# Patient Record
Sex: FEMALE | Race: BLACK OR AFRICAN AMERICAN | NOT HISPANIC OR LATINO | Employment: FULL TIME | URBAN - METROPOLITAN AREA
[De-identification: names, ages, dates, MRNs, and addresses within clinical notes are randomized per-mention and may not be internally consistent; named-entity substitution may affect disease eponyms.]

---

## 2017-01-13 NOTE — TELEPHONE ENCOUNTER
Pt is requesting a refill for diflucan pt was last seen on 06/16/2016. Pt has an appt in feb for a follow up

## 2017-01-16 RX ORDER — FLUCONAZOLE 150 MG/1
TABLET ORAL
Qty: 1 TABLET | Refills: 1 | Status: SHIPPED | OUTPATIENT
Start: 2017-01-16 | End: 2017-03-16 | Stop reason: SDUPTHER

## 2017-02-07 ENCOUNTER — OFFICE VISIT (OUTPATIENT)
Dept: OBSTETRICS AND GYNECOLOGY | Facility: CLINIC | Age: 37
End: 2017-02-07
Payer: COMMERCIAL

## 2017-02-07 ENCOUNTER — PATIENT MESSAGE (OUTPATIENT)
Dept: OBSTETRICS AND GYNECOLOGY | Facility: CLINIC | Age: 37
End: 2017-02-07

## 2017-02-07 VITALS
BODY MASS INDEX: 31.24 KG/M2 | WEIGHT: 183 LBS | SYSTOLIC BLOOD PRESSURE: 120 MMHG | DIASTOLIC BLOOD PRESSURE: 80 MMHG | HEIGHT: 64 IN

## 2017-02-07 DIAGNOSIS — D25.1 INTRAMURAL LEIOMYOMA OF UTERUS: ICD-10-CM

## 2017-02-07 DIAGNOSIS — N92.0 MENORRHAGIA WITH REGULAR CYCLE: Primary | ICD-10-CM

## 2017-02-07 DIAGNOSIS — Z01.411 ENCOUNTER FOR GYNECOLOGICAL EXAMINATION WITH ABNORMAL FINDING: ICD-10-CM

## 2017-02-07 DIAGNOSIS — I10 ESSENTIAL HYPERTENSION: ICD-10-CM

## 2017-02-07 PROCEDURE — 3074F SYST BP LT 130 MM HG: CPT | Mod: S$GLB,,, | Performed by: OBSTETRICS & GYNECOLOGY

## 2017-02-07 PROCEDURE — 99999 PR PBB SHADOW E&M-EST. PATIENT-LVL II: CPT | Mod: PBBFAC,,, | Performed by: OBSTETRICS & GYNECOLOGY

## 2017-02-07 PROCEDURE — 88141 CYTOPATH C/V INTERPRET: CPT | Mod: ,,, | Performed by: PATHOLOGY

## 2017-02-07 PROCEDURE — 99395 PREV VISIT EST AGE 18-39: CPT | Mod: S$GLB,,, | Performed by: OBSTETRICS & GYNECOLOGY

## 2017-02-07 PROCEDURE — 88175 CYTOPATH C/V AUTO FLUID REDO: CPT | Performed by: PATHOLOGY

## 2017-02-07 PROCEDURE — 3079F DIAST BP 80-89 MM HG: CPT | Mod: S$GLB,,, | Performed by: OBSTETRICS & GYNECOLOGY

## 2017-02-07 RX ORDER — ACETAMINOPHEN AND CODEINE PHOSPHATE 120; 12 MG/5ML; MG/5ML
1 SOLUTION ORAL DAILY
Qty: 28 TABLET | Refills: 4 | Status: SHIPPED | OUTPATIENT
Start: 2017-02-07 | End: 2018-09-18

## 2017-02-16 NOTE — PROGRESS NOTES
HISTORY OF PRESENT ILLNESS:    Sophie Courtney is a 36 y.o. female, , Patient's last menstrual period was 2017.,  presents for a routine exam.   Patient continues to report heavy menses & clotting. Strict precautions given & options discussed in detail.  Patient declines surgical intervention, CHTN therefore combination OCPs are contraindicated,.     Past Medical History   Diagnosis Date    Hyperlipidemia     Hypertension        Past Surgical History   Procedure Laterality Date    Myomectomy       Seven uterine fibroids, largest 11 cm in size     [place] tooth extraction  2013 &      Knee arthroscopy w/ acl reconstruction Right     Achilles tendon surgery Left     Myomectomy       The specimen weighs 779 g and consists of 27 rounded pieces of rubbery tan-pink tissue. The nodules vary from     MEDICATIONS AND ALLERGIES:    Current Outpatient Prescriptions:     atorvastatin (LIPITOR) 20 MG tablet, Take 20 mg by mouth once daily., Disp: , Rfl:     chlorthalidone (HYGROTEN) 25 MG Tab, , Disp: , Rfl:     flavoxate (URISPAS) 100 mg Tab, Take 1 tablet (100 mg total) by mouth 3 (three) times daily as needed., Disp: 20 tablet, Rfl: 1    fluconazole (DIFLUCAN) 150 MG Tab, TK ONE T     PO ONCE, Disp: 1 tablet, Rfl: 1    medroxyPROGESTERone (PROVERA) 10 MG tablet, Take 1 tablet (10 mg total) by mouth once daily. Take one pill per day on cycle days # 14-25 for a total of 12 days., Disp: 36 tablet, Rfl: 0    norethindrone (JOLIVETTE) 0.35 mg tablet, Take 1 tablet (0.35 mg total) by mouth once daily., Disp: 28 tablet, Rfl: 4    Review of patient's allergies indicates:   Allergen Reactions    Crestor [rosuvastatin]     Neosporin [hydrocortisone]     Nickel sutures [surgical stainless steel] Hives       Family History   Problem Relation Age of Onset    Hypertension Father     Hypertension Mother     Stroke Maternal Aunt        Social History     Social History     "Marital status: Single     Spouse name: N/A    Number of children: N/A    Years of education: N/A     Occupational History    Not on file.     Social History Main Topics    Smoking status: Never Smoker    Smokeless tobacco: Not on file    Alcohol use No    Drug use: No    Sexual activity: No     Other Topics Concern    Not on file     Social History Narrative       COMPREHENSIVE GYN HISTORY:  PAP History: Denies abnormal Paps.  Infection History: Denies STDs. Denies PID.  Benign History: Denies uterine fibroids. Denies ovarian cysts. Denies endometriosis. Denies other conditions.  Cancer History: Denies cervical cancer. Denies uterine cancer or hyperplasia. Denies ovarian cancer. Denies vulvar cancer or pre-cancer. Denies vaginal cancer or pre-cancer. Denies breast cancer. Denies colon cancer.  Sexual Activity History: Denies currently being sexually active      ROS:  GENERAL: No weight changes. No swelling. No fatigue. No fever.  CARDIOVASCULAR: No chest pain. No shortness of breath. No leg cramps.   NEUROLOGICAL: No headaches. No vision changes.  BREASTS: No pain. No lumps. No discharge.  ABDOMEN: No pain. No nausea. No vomiting. No diarrhea. No constipation.  REPRODUCTIVE: No abnormal bleeding.   VULVA: No pain. No lesions. No itching.  VAGINA: No relaxation. No itching. No odor. No discharge. No lesions.  URINARY: No incontinence. No nocturia. No frequency. No dysuria.    Visit Vitals    /80    Ht 5' 4" (1.626 m)    Wt 83 kg (182 lb 15.7 oz)    LMP 01/23/2017    BMI 31.41 kg/m2       PE:  APPEARANCE: Well nourished, well developed, in no acute distress.  AFFECT: WNL, alert and oriented x 3.  SKIN: No acne or hirsutism.  NECK: Neck symmetric, without masses or thyromegaly.  NODES: No inguinal, cervical, axillary or femoral lymph node enlargement.  CHEST: Good respiratory effort.   ABDOMEN: Soft. No tenderness or masses. No hepatosplenomegaly. No hernias.  BREASTS: Symmetrical, no skin changes, " visible lesions, palpable masses or nipple discharge bilaterally.  PELVIC: External female genitalia without lesions.  Female hair distribution. Adequate perineal body, Normal urethral meatus. Vagina moist and well rugated without lesions or discharge.  No significant cystocele or rectocele present. Cervix pink without lesions, discharge or tenderness. Uterus is 14-16 week size, irregular, mobile and nontender. Adnexa without masses or tenderness.  EXTREMITIES: No edema    PROCEDURES:  Pap    1. Menorrhagia with regular cycle    2. Essential hypertension    3. Intramural leiomyoma of uterus    4. Encounter for gynecological examination with abnormal finding        PLAN:    Orders Placed This Encounter    CBC auto differential    T4, free    TSH    Liquid-based pap smear, screening    norethindrone (JOLIVETTE) 0.35 mg tablet       COUNSELING:  The patient was counseled today on:  -A.C.S. Pap and pelvic exam guidelines, recomendations for yearly mammogram, monthly self breast exams and to follow up with her PCP for other health maintenance.    Options discussed for UF, patient desires medical management at this time.     FOLLOW-UP with me in 6 months

## 2017-03-17 RX ORDER — FLUCONAZOLE 150 MG/1
TABLET ORAL
Qty: 1 TABLET | Refills: 1 | Status: SHIPPED | OUTPATIENT
Start: 2017-03-17 | End: 2017-03-24 | Stop reason: SDUPTHER

## 2017-03-24 RX ORDER — FLUCONAZOLE 150 MG/1
TABLET ORAL
Qty: 1 TABLET | Refills: 1 | Status: SHIPPED | OUTPATIENT
Start: 2017-03-24 | End: 2017-10-02 | Stop reason: SDUPTHER

## 2017-03-24 NOTE — TELEPHONE ENCOUNTER
Pt states that the pharmacy did nto have her refill on the diflucan and she wanted it called in again.

## 2017-10-05 RX ORDER — FLUCONAZOLE 150 MG/1
TABLET ORAL
Qty: 1 TABLET | Refills: 1 | Status: SHIPPED | OUTPATIENT
Start: 2017-10-05 | End: 2017-10-18 | Stop reason: SDUPTHER

## 2017-10-18 ENCOUNTER — TELEPHONE (OUTPATIENT)
Dept: OBSTETRICS AND GYNECOLOGY | Facility: CLINIC | Age: 37
End: 2017-10-18

## 2017-10-18 RX ORDER — FLUCONAZOLE 150 MG/1
TABLET ORAL
Qty: 1 TABLET | Refills: 1 | Status: SHIPPED | OUTPATIENT
Start: 2017-10-18 | End: 2018-09-18 | Stop reason: SDUPTHER

## 2017-10-18 NOTE — TELEPHONE ENCOUNTER
----- Message from Dante Cerda sent at 10/18/2017  4:43 PM CDT -----  Contact: pt  x_ 1st Request   _ 2nd Request   _ 3rd Request     Who: SYLVIA BARRAGAN [[id number]]    Why: pt called stated pharmacy told her they did not have her prescription for Rx fluconazole (DIFLUCAN) 150 MG Tab.  Please call the prescription in to [id number] - [address] [phone]    What Number to Call Back: [phone]    When to Expect a call back: (Before the end of the day)   -- if call after 3:00 call back will be tomorrow.

## 2017-10-18 NOTE — TELEPHONE ENCOUNTER
----- Message from Isis Burris sent at 10/18/2017 12:15 PM CDT -----  Contact: self  x_  1st Request  _  2nd Request  _  3rd Request    Who:pt    Why: pt needs to speak with a nurse in regards to prescription that needed to be sent to local pharmacy.. Please advise    What Number to Call Back: [phone]    When to Expect a call back: (Before the end of the day)   -- if call after 3:00 call back will be tomorrow.

## 2017-10-18 NOTE — TELEPHONE ENCOUNTER
Called pt's pharmacy regarding her Rx not being available.  Spoke to Regina at [facility] who stated that the pt's Rx was called in and that she has already picked it up.

## 2018-09-18 ENCOUNTER — OFFICE VISIT (OUTPATIENT)
Dept: OBSTETRICS AND GYNECOLOGY | Facility: CLINIC | Age: 38
End: 2018-09-18
Payer: COMMERCIAL

## 2018-09-18 VITALS
WEIGHT: 187.38 LBS | HEIGHT: 64 IN | SYSTOLIC BLOOD PRESSURE: 120 MMHG | BODY MASS INDEX: 31.99 KG/M2 | DIASTOLIC BLOOD PRESSURE: 80 MMHG

## 2018-09-18 DIAGNOSIS — N92.0 MENORRHAGIA WITH REGULAR CYCLE: ICD-10-CM

## 2018-09-18 DIAGNOSIS — Z01.419 ENCOUNTER FOR GYNECOLOGICAL EXAMINATION WITHOUT ABNORMAL FINDING: ICD-10-CM

## 2018-09-18 DIAGNOSIS — D25.1 INTRAMURAL LEIOMYOMA OF UTERUS: ICD-10-CM

## 2018-09-18 DIAGNOSIS — Z12.31 VISIT FOR SCREENING MAMMOGRAM: ICD-10-CM

## 2018-09-18 DIAGNOSIS — I10 ESSENTIAL HYPERTENSION: Primary | ICD-10-CM

## 2018-09-18 PROCEDURE — 99999 PR PBB SHADOW E&M-EST. PATIENT-LVL III: CPT | Mod: PBBFAC,,, | Performed by: OBSTETRICS & GYNECOLOGY

## 2018-09-18 PROCEDURE — 99395 PREV VISIT EST AGE 18-39: CPT | Mod: S$GLB,,, | Performed by: OBSTETRICS & GYNECOLOGY

## 2018-09-18 PROCEDURE — 3074F SYST BP LT 130 MM HG: CPT | Mod: CPTII,S$GLB,, | Performed by: OBSTETRICS & GYNECOLOGY

## 2018-09-18 PROCEDURE — 3079F DIAST BP 80-89 MM HG: CPT | Mod: CPTII,S$GLB,, | Performed by: OBSTETRICS & GYNECOLOGY

## 2018-09-18 NOTE — PROGRESS NOTES
HISTORY OF PRESENT ILLNESS:    Sophie Courtney is a 37 y.o. female, , Patient's last menstrual period was 2018.,  presents for a routine exam.   Cycles lasting 7 days using 12 pads per day (double protection) with occasional clotting.   Significant fatigue the first two days. Strict precautions given & options discussed in detail.  Patient declines surgical intervention, CHTN therefore combination OCPs are contraindicated,.     Past Medical History:   Diagnosis Date    Hyperlipidemia     Hypertension        Past Surgical History:   Procedure Laterality Date    ACHILLES TENDON SURGERY Left     KNEE ARTHROSCOPY W/ ACL RECONSTRUCTION Right     MYOMECTOMY      Seven uterine fibroids, largest 11 cm in size     MYOMECTOMY      The specimen weighs 779 g and consists of 27 rounded pieces of rubbery tan-pink tissue. The nodules vary from    MYOMECTOMY abdominal N/A 7/10/2014    Performed by [facility]oval [facility] at [facility]    WISDOM TOOTH EXTRACTION  2013 &       MEDICATIONS AND ALLERGIES:    Current Outpatient Medications:     atorvastatin (LIPITOR) 20 MG tablet, Take 20 mg by mouth once daily., Disp: , Rfl:     chlorthalidone (HYGROTEN) 25 MG Tab, , Disp: , Rfl:     fluconazole (DIFLUCAN) 150 MG Tab, TK ONE T     PO ONCE, Disp: 1 tablet, Rfl: 1    Review of patient's allergies indicates:   Allergen Reactions    Crestor [rosuvastatin]     Neosporin [hydrocortisone]     Nickel sutures [surgical stainless steel] Hives       Family History   Problem Relation Age of Onset    Hypertension Father     Hypertension Mother     Stroke Maternal Aunt     Breast cancer Paternal Aunt 60       Social History     Socioeconomic History    Marital status: Single     Spouse name: Not on file    Number of children: Not on file    Years of education: Not on file    Highest education level: Not on file   Social Needs    Financial resource strain: Not on file    Food insecurity  "- worry: Not on file    Food insecurity - inability: Not on file    Transportation needs - medical: Not on file    Transportation needs - non-medical: Not on file   Occupational History    Not on file   Tobacco Use    Smoking status: Never Smoker    Smokeless tobacco: Never Used   Substance and Sexual Activity    Alcohol use: No    Drug use: No    Sexual activity: No   Other Topics Concern    Not on file   Social History Narrative    Not on file       COMPREHENSIVE GYN HISTORY:  PAP History: Denies abnormal Paps.  Infection History: Denies STDs. Denies PID.  Benign History: Denies uterine fibroids. Denies ovarian cysts. Denies endometriosis. Denies other conditions.  Cancer History: Denies cervical cancer. Denies uterine cancer or hyperplasia. Denies ovarian cancer. Denies vulvar cancer or pre-cancer. Denies vaginal cancer or pre-cancer. Denies breast cancer. Denies colon cancer.  Sexual Activity History: Denies currently being sexually active      ROS:  GENERAL: No weight changes. No swelling. No fatigue. No fever.  CARDIOVASCULAR: No chest pain. No shortness of breath. No leg cramps.   NEUROLOGICAL: No headaches. No vision changes.  BREASTS: No pain. No lumps. No discharge.  ABDOMEN: No pain. No nausea. No vomiting. No diarrhea. No constipation.  REPRODUCTIVE: No abnormal bleeding.   VULVA: No pain. No lesions. No itching.  VAGINA: No relaxation. No itching. No odor. No discharge. No lesions.  URINARY: No incontinence. No nocturia. No frequency. No dysuria.    /80   Ht 5' 4" (1.626 m)   Wt 85 kg (187 lb 6.3 oz)   LMP 09/04/2018   BMI 32.17 kg/m²     PE:  APPEARANCE: Well nourished, well developed, in no acute distress.  AFFECT: WNL, alert and oriented x 3.  SKIN: No acne or hirsutism.  NECK: Neck symmetric, without masses or thyromegaly.  NODES: No inguinal, cervical, axillary or femoral lymph node enlargement.  CHEST: Good respiratory effort.   ABDOMEN: Soft. No tenderness or masses. No " hepatosplenomegaly. No hernias.  BREASTS: Symmetrical, no skin changes, visible lesions, palpable masses or nipple discharge bilaterally.  PELVIC: External female genitalia without lesions.  Female hair distribution. Adequate perineal body, Normal urethral meatus. Vagina moist and well rugated without lesions or discharge.  No significant cystocele or rectocele present. Cervix pink without lesions, discharge or tenderness. Uterus is 22-24 weeks size, irregular, mobile and nontender. Adnexa without masses or tenderness.  EXTREMITIES: No edema      1. Essential hypertension    2. Menorrhagia with regular cycle    3. Intramural leiomyoma of uterus    4. Encounter for gynecological examination without abnormal finding      COUNSELING:  The patient was counseled today on:  -A.C.S. Pap and pelvic exam guidelines, recomendations for yearly mammogram, monthly self breast exams and to follow up with her PCP for other health maintenance.    Options discussed for UF, patient desires conservative management at this time.     FOLLOW-UP with me in 3 months

## 2018-09-21 RX ORDER — FLUCONAZOLE 150 MG/1
TABLET ORAL
Qty: 1 TABLET | Refills: 3 | Status: SHIPPED | OUTPATIENT
Start: 2018-09-21 | End: 2020-11-13 | Stop reason: SDUPTHER

## 2018-10-09 ENCOUNTER — HOSPITAL ENCOUNTER (OUTPATIENT)
Dept: RADIOLOGY | Facility: OTHER | Age: 38
Discharge: HOME | End: 2018-10-09
Attending: OBSTETRICS & GYNECOLOGY
Payer: COMMERCIAL

## 2018-10-09 DIAGNOSIS — N92.0 MENORRHAGIA WITH REGULAR CYCLE: ICD-10-CM

## 2018-10-09 DIAGNOSIS — D25.1 INTRAMURAL LEIOMYOMA OF UTERUS: ICD-10-CM

## 2018-10-09 PROCEDURE — 76856 US EXAM PELVIC COMPLETE: CPT | Mod: TC

## 2018-10-09 PROCEDURE — 76856 US EXAM PELVIC COMPLETE: CPT | Mod: 26,,, | Performed by: RADIOLOGY

## 2018-10-11 ENCOUNTER — TELEPHONE (OUTPATIENT)
Dept: OBSTETRICS AND GYNECOLOGY | Facility: CLINIC | Age: 38
End: 2018-10-11

## 2019-01-02 ENCOUNTER — OFFICE VISIT (OUTPATIENT)
Dept: OBSTETRICS AND GYNECOLOGY | Facility: CLINIC | Age: 39
End: 2019-01-02
Payer: COMMERCIAL

## 2019-01-02 ENCOUNTER — HOSPITAL ENCOUNTER (OUTPATIENT)
Dept: RADIOLOGY | Facility: OTHER | Age: 39
Discharge: HOME | End: 2019-01-02
Attending: OBSTETRICS & GYNECOLOGY
Payer: COMMERCIAL

## 2019-01-02 VITALS — SYSTOLIC BLOOD PRESSURE: 120 MMHG | DIASTOLIC BLOOD PRESSURE: 70 MMHG

## 2019-01-02 DIAGNOSIS — N92.0 MENORRHAGIA WITH REGULAR CYCLE: ICD-10-CM

## 2019-01-02 DIAGNOSIS — Z12.31 VISIT FOR SCREENING MAMMOGRAM: ICD-10-CM

## 2019-01-02 DIAGNOSIS — I10 ESSENTIAL HYPERTENSION: Primary | ICD-10-CM

## 2019-01-02 DIAGNOSIS — Z91.89 AT HIGH RISK FOR BREAST CANCER: ICD-10-CM

## 2019-01-02 PROCEDURE — 99999 PR PBB SHADOW E&M-EST. PATIENT-LVL II: CPT | Mod: PBBFAC,,, | Performed by: OBSTETRICS & GYNECOLOGY

## 2019-01-02 PROCEDURE — 77067 SCR MAMMO BI INCL CAD: CPT | Mod: TC

## 2019-01-02 PROCEDURE — 3078F PR MOST RECENT DIASTOLIC BLOOD PRESSURE < 80 MM HG: ICD-10-PCS | Mod: CPTII,S$GLB,, | Performed by: OBSTETRICS & GYNECOLOGY

## 2019-01-02 PROCEDURE — 77063 MAMMO DIGITAL SCREENING BILAT WITH TOMOSYNTHESIS_CAD: ICD-10-PCS | Mod: 26,,, | Performed by: INTERNAL MEDICINE

## 2019-01-02 PROCEDURE — 3074F SYST BP LT 130 MM HG: CPT | Mod: CPTII,S$GLB,, | Performed by: OBSTETRICS & GYNECOLOGY

## 2019-01-02 PROCEDURE — 3074F PR MOST RECENT SYSTOLIC BLOOD PRESSURE < 130 MM HG: ICD-10-PCS | Mod: CPTII,S$GLB,, | Performed by: OBSTETRICS & GYNECOLOGY

## 2019-01-02 PROCEDURE — 99213 PR OFFICE/OUTPT VISIT, EST, LEVL III, 20-29 MIN: ICD-10-PCS | Mod: S$GLB,,, | Performed by: OBSTETRICS & GYNECOLOGY

## 2019-01-02 PROCEDURE — 77067 MAMMO DIGITAL SCREENING BILAT WITH TOMOSYNTHESIS_CAD: ICD-10-PCS | Mod: 26,,, | Performed by: INTERNAL MEDICINE

## 2019-01-02 PROCEDURE — 77067 SCR MAMMO BI INCL CAD: CPT | Mod: 26,,, | Performed by: INTERNAL MEDICINE

## 2019-01-02 PROCEDURE — 99213 OFFICE O/P EST LOW 20 MIN: CPT | Mod: S$GLB,,, | Performed by: OBSTETRICS & GYNECOLOGY

## 2019-01-02 PROCEDURE — 3078F DIAST BP <80 MM HG: CPT | Mod: CPTII,S$GLB,, | Performed by: OBSTETRICS & GYNECOLOGY

## 2019-01-02 PROCEDURE — 99999 PR PBB SHADOW E&M-EST. PATIENT-LVL II: ICD-10-PCS | Mod: PBBFAC,,, | Performed by: OBSTETRICS & GYNECOLOGY

## 2019-01-02 PROCEDURE — 77063 BREAST TOMOSYNTHESIS BI: CPT | Mod: 26,,, | Performed by: INTERNAL MEDICINE

## 2019-01-06 NOTE — PROGRESS NOTES
HISTORY OF PRESENT ILLNESS:    Sophie Courtney is a 38 y.o. female  Patient's last menstrual period was 2019. presents today for follow-up.   She continues to report menorrhagia and has some pelvic discomfort.     Narrative     EXAMINATION:  US PELVIS COMPLETE NON OB    CLINICAL HISTORY:  Excessive and frequent menstruation with regular cycle    TECHNIQUE:  Transabdominal sonography of the pelvis was performed, followed by transvaginal sonography to better evaluate the uterus and ovaries.    COMPARISON:  MRI of the pelvis 2016    FINDINGS:  Uterus:    Size: 21.7 x 15.8 x 19.6 cm    Masses: The uterus is diffusely heterogeneous in appearance with multiple round lesion seen throughout the intramural location.  The dominant fibroid measures 6.3 x 6.3 x 8 cm.    Endometrium: Normal in this pre menopausal patient, measuring 5 mm.    Right ovary:    Size: 3.9 x 2.7 x 2.3 cm    Appearance: Normal    Vascular flow: Normal.    Left ovary:    Size: 5.5 x 5.9 x 4.6 cm    Appearance: Normal    Vascular Flow: Normal.    Free Fluid:    None.      Impression       Enlarged multifibroid uterus      Electronically signed by: Chaparro Mclain MD  Date: 10/09/2018  Time: 11:26         Past Medical History:   Diagnosis Date    Hyperlipidemia     Hypertension        Past Surgical History:   Procedure Laterality Date    ACHILLES TENDON SURGERY Left     KNEE ARTHROSCOPY W/ ACL RECONSTRUCTION Right     MYOMECTOMY  2009    Seven uterine fibroids, largest 11 cm in size     MYOMECTOMY      The specimen weighs 779 g and consists of 27 rounded pieces of rubbery tan-pink tissue. The nodules vary from    MYOMECTOMY abdominal N/A 7/10/2014    [facility]    WISDOM TOOTH EXTRACTION  2013 &         MEDICATIONS AND ALLERGIES:      Current Outpatient Medications:     atorvastatin (LIPITOR) 20 MG tablet, Take 20 mg by mouth once daily., Disp: , Rfl:      chlorthalidone (HYGROTEN) 25 MG Tab, , Disp: , Rfl:     fluconazole (DIFLUCAN) 150 MG Tab, TK ONE T     PO ONCE, Disp: 1 tablet, Rfl: 3    Review of patient's allergies indicates:   Allergen Reactions    Crestor [rosuvastatin]     Neosporin [hydrocortisone]     Nickel sutures [surgical stainless steel] Hives       COMPREHENSIVE GYN HISTORY:  PAP History: Denies abnormal Paps.  Infection History: Denies STDs. Denies PID.  Benign History: Denies uterine fibroids. Denies ovarian cysts. Denies endometriosis. Denies other conditions.  Cancer History: Denies cervical cancer. Denies uterine cancer or hyperplasia. Denies ovarian cancer. Denies vulvar cancer or pre-cancer. Denies vaginal cancer or pre-cancer. Denies breast cancer. Denies colon cancer.  Sexual Activity History: Reports currently being sexually active  Menstrual History: Every 28 days, flows for 4 days. Light flow.  Dysmenorrhea History: Denies dysmenorrhea.    ROS:  GENERAL: No fever or chills.  BREASTS: No pain. No lumps. No discharge.  ABDOMEN: No pain. No nausea. No vomiting. No diarrhea. No constipation.  REPRODUCTIVE: No abnormal bleeding.   VULVA: No pain. No lesions. No itching.  VAGINA: No relaxation. No itching. No odor. No discharge. No lesions.  URINARY: No incontinence. No nocturia. No frequency. No dysuria.    PE:  APPEARANCE: Well nourished, well developed, in no acute distress.  AFFECT: WNL, alert and oriented x 3.  Deferred    1. Essential hypertension    2. Menorrhagia with regular cycle    3. At high risk for breast cancer        PLAN:    Orders Placed This Encounter    MRI Breast w/o Contrast, Bilateral       COUNSELING:  The patient was counseled today on uterine fibroids.     FOLLOW-UP with me

## 2019-01-10 ENCOUNTER — OFFICE VISIT (OUTPATIENT)
Dept: OBSTETRICS AND GYNECOLOGY | Facility: CLINIC | Age: 39
End: 2019-01-10
Payer: COMMERCIAL

## 2019-01-10 VITALS — WEIGHT: 165 LBS | BODY MASS INDEX: 28.32 KG/M2

## 2019-01-10 DIAGNOSIS — D25.1 INTRAMURAL LEIOMYOMA OF UTERUS: ICD-10-CM

## 2019-01-10 DIAGNOSIS — I10 ESSENTIAL HYPERTENSION: Primary | ICD-10-CM

## 2019-01-10 DIAGNOSIS — N92.0 MENORRHAGIA WITH REGULAR CYCLE: ICD-10-CM

## 2019-01-10 PROCEDURE — 3008F BODY MASS INDEX DOCD: CPT | Mod: CPTII,S$GLB,, | Performed by: OBSTETRICS & GYNECOLOGY

## 2019-01-10 PROCEDURE — 99999 PR PBB SHADOW E&M-EST. PATIENT-LVL II: ICD-10-PCS | Mod: PBBFAC,,, | Performed by: OBSTETRICS & GYNECOLOGY

## 2019-01-10 PROCEDURE — 99212 PR OFFICE/OUTPT VISIT, EST, LEVL II, 10-19 MIN: ICD-10-PCS | Mod: S$GLB,,, | Performed by: OBSTETRICS & GYNECOLOGY

## 2019-01-10 PROCEDURE — 3008F PR BODY MASS INDEX (BMI) DOCUMENTED: ICD-10-PCS | Mod: CPTII,S$GLB,, | Performed by: OBSTETRICS & GYNECOLOGY

## 2019-01-10 PROCEDURE — 99999 PR PBB SHADOW E&M-EST. PATIENT-LVL II: CPT | Mod: PBBFAC,,, | Performed by: OBSTETRICS & GYNECOLOGY

## 2019-01-10 PROCEDURE — 99212 OFFICE O/P EST SF 10 MIN: CPT | Mod: S$GLB,,, | Performed by: OBSTETRICS & GYNECOLOGY

## 2019-01-10 RX ORDER — CLOTRIMAZOLE AND BETAMETHASONE DIPROPIONATE 10; .64 MG/G; MG/G
CREAM TOPICAL
COMMUNITY
Start: 2019-01-09 | End: 2020-11-09

## 2019-01-10 NOTE — PROGRESS NOTES
HISTORY OF PRESENT ILLNESS:    Sophie Courtney is a 38 y.o. female  Patient's last menstrual period was 2019. presents today for follow up.   Labs & Us reviewed.     Menorrhagia - first two days using 8-10 pads per day with clotting. Feels drained. Other days, bleeding somewhat improved.     Narrative       EXAMINATION:  US PELVIS COMPLETE NON OB    CLINICAL HISTORY:  Excessive and frequent menstruation with regular cycle    TECHNIQUE:  Transabdominal sonography of the pelvis was performed, followed by transvaginal sonography to better evaluate the uterus and ovaries.    COMPARISON:  MRI of the pelvis 2016    FINDINGS:  Uterus:    Size: 21.7 x 15.8 x 19.6 cm    Masses: The uterus is diffusely heterogeneous in appearance with multiple round lesion seen throughout the intramural location.  The dominant fibroid measures 6.3 x 6.3 x 8 cm.    Endometrium: Normal in this pre menopausal patient, measuring 5 mm.    Right ovary:    Size: 3.9 x 2.7 x 2.3 cm    Appearance: Normal    Vascular flow: Normal.    Left ovary:    Size: 5.5 x 5.9 x 4.6 cm    Appearance: Normal    Vascular Flow: Normal.    Free Fluid:    None.       Impression         Enlarged multifibroid uterus      Electronically signed by: Chaparro Mclain MD  Date: 10/09/2018  Time: 11:26         Past Medical History:   Diagnosis Date    Hyperlipidemia     Hypertension        Past Surgical History:   Procedure Laterality Date    ACHILLES TENDON SURGERY Left     KNEE ARTHROSCOPY W/ ACL RECONSTRUCTION Right     MYOMECTOMY      Seven uterine fibroids, largest 11 cm in size     MYOMECTOMY      The specimen weighs 779 g and consists of 27 rounded pieces of rubbery tan-pink tissue. The nodules vary from    MYOMECTOMY abdominal N/A 7/10/2014    Performed by [facility] at [facility] OR    WISDOM TOOTH EXTRACTION  2013 &         MEDICATIONS AND ALLERGIES:      Current Outpatient Medications:      atorvastatin (LIPITOR) 20 MG tablet, Take 20 mg by mouth once daily., Disp: , Rfl:     chlorthalidone (HYGROTEN) 25 MG Tab, , Disp: , Rfl:     clotrimazole-betamethasone 1-0.05% (LOTRISONE) cream, , Disp: , Rfl:     fluconazole (DIFLUCAN) 150 MG Tab, TK ONE T     PO ONCE, Disp: 1 tablet, Rfl: 3    Review of patient's allergies indicates:   Allergen Reactions    Crestor [rosuvastatin]     Neosporin [hydrocortisone]     Nickel sutures [surgical stainless steel] Hives       COMPREHENSIVE GYN HISTORY:  PAP History: Denies abnormal Paps.  Infection History: Denies STDs. Denies PID.  Benign History: Denies uterine fibroids. Denies ovarian cysts. Denies endometriosis. Denies other conditions.  Cancer History: Denies cervical cancer. Denies uterine cancer or hyperplasia. Denies ovarian cancer. Denies vulvar cancer or pre-cancer. Denies vaginal cancer or pre-cancer. Denies breast cancer. Denies colon cancer.  Sexual Activity History: Reports currently being sexually active  Menstrual History: Every 28 days, flows for 4 days. Light flow.  Dysmenorrhea History: Denies dysmenorrhea.    ROS:  GENERAL: No fever or chills.  BREASTS: No pain. No lumps. No discharge.  ABDOMEN: No pain. No nausea. No vomiting. No diarrhea. No constipation.  REPRODUCTIVE: No abnormal bleeding.   VULVA: No pain. No lesions. No itching.  VAGINA: No relaxation. No itching. No odor. No discharge. No lesions.  URINARY: No incontinence. No nocturia. No frequency. No dysuria.    PE:  APPEARANCE: Well nourished, well developed, in no acute distress.  AFFECT: WNL, alert and oriented x 3.  ABDOMEN: Soft. No tenderness or masses. No hepatosplenomegaly. No hernias.      1. Essential hypertension    2. Intramural leiomyoma of uterus    3. Menorrhagia with regular cycle      FOLLOW-UP with me in 3 months   Options discussed for menorrhagia, patient unsure of [place]. Does not desire hysterectomy at this time. Medical options also discussed.

## 2019-01-14 ENCOUNTER — HOSPITAL ENCOUNTER (OUTPATIENT)
Dept: RADIOLOGY | Facility: OTHER | Age: 39
Discharge: HOME | End: 2019-01-14
Attending: OBSTETRICS & GYNECOLOGY
Payer: COMMERCIAL

## 2019-01-14 DIAGNOSIS — R92.8 ABNORMAL MAMMOGRAM: ICD-10-CM

## 2019-01-14 PROCEDURE — 77065 DX MAMMO INCL CAD UNI: CPT | Mod: TC,LT

## 2019-01-14 PROCEDURE — 77061 BREAST TOMOSYNTHESIS UNI: CPT | Mod: TC,LT

## 2019-01-14 PROCEDURE — 77065 MAMMO DIGITAL DIAGNOSTIC LEFT WITH TOMOSYNTHESIS_CAD: ICD-10-PCS | Mod: 26,LT,, | Performed by: RADIOLOGY

## 2019-01-14 PROCEDURE — 77065 DX MAMMO INCL CAD UNI: CPT | Mod: 26,LT,, | Performed by: RADIOLOGY

## 2019-01-14 PROCEDURE — 77061 MAMMO DIGITAL DIAGNOSTIC LEFT WITH TOMOSYNTHESIS_CAD: ICD-10-PCS | Mod: 26,LT,, | Performed by: RADIOLOGY

## 2019-01-14 PROCEDURE — 77061 BREAST TOMOSYNTHESIS UNI: CPT | Mod: 26,LT,, | Performed by: RADIOLOGY

## 2020-07-15 ENCOUNTER — PATIENT MESSAGE (OUTPATIENT)
Dept: FAMILY MEDICINE | Facility: CLINIC | Age: 40
End: 2020-07-15

## 2020-07-15 RX ORDER — CHLORTHALIDONE 25 MG/1
25 TABLET ORAL DAILY
Qty: 30 TABLET | Refills: 3 | Status: SHIPPED | OUTPATIENT
Start: 2020-07-15 | End: 2021-09-16 | Stop reason: SDUPTHER

## 2020-07-15 RX ORDER — ATORVASTATIN CALCIUM 20 MG/1
20 TABLET, FILM COATED ORAL DAILY
Qty: 30 TABLET | Refills: 3 | Status: SHIPPED | OUTPATIENT
Start: 2020-07-15 | End: 2021-09-16 | Stop reason: SDUPTHER

## 2020-07-15 NOTE — TELEPHONE ENCOUNTER
Requesting refill. Says she is put of medication. Has not been seen in over a year. Refused a virtual visit. Please advise. Okay to refill? Thanks

## 2020-07-15 NOTE — TELEPHONE ENCOUNTER
This prescription has been approved and sent to   [id number] - [place] - [address] AT [address]  [facility] [address]  Phone: [phone] Fax: [phone]

## 2020-07-28 ENCOUNTER — TELEPHONE (OUTPATIENT)
Dept: FAMILY MEDICINE | Facility: CLINIC | Age: 40
End: 2020-07-28

## 2020-07-28 DIAGNOSIS — Z13.6 SCREENING FOR ISCHEMIC HEART DISEASE (IHD): ICD-10-CM

## 2020-07-28 DIAGNOSIS — Z13.89 SCREENING FOR BLOOD OR PROTEIN IN URINE: Primary | ICD-10-CM

## 2020-07-28 DIAGNOSIS — Z13.0 SCREENING FOR IRON DEFICIENCY ANEMIA: ICD-10-CM

## 2020-07-28 DIAGNOSIS — I10 ESSENTIAL HYPERTENSION: ICD-10-CM

## 2020-07-28 NOTE — TELEPHONE ENCOUNTER
----- Message from Fawn Anderson sent at 7/28/2020  9:48 AM CDT -----  Pt wants her lab work done before her next appt on 8/26. Please send lab orders to [facility]. Pt #[phone]

## 2020-08-07 ENCOUNTER — OFFICE VISIT (OUTPATIENT)
Dept: OBSTETRICS AND GYNECOLOGY | Facility: CLINIC | Age: 40
End: 2020-08-07
Payer: COMMERCIAL

## 2020-08-07 VITALS — BODY MASS INDEX: 30.11 KG/M2 | HEIGHT: 64 IN | WEIGHT: 176.38 LBS

## 2020-08-07 DIAGNOSIS — Z00.00 VISIT FOR ANNUAL HEALTH EXAMINATION: ICD-10-CM

## 2020-08-07 DIAGNOSIS — D25.9 UTERINE LEIOMYOMA, UNSPECIFIED LOCATION: Primary | ICD-10-CM

## 2020-08-07 DIAGNOSIS — Z12.31 VISIT FOR SCREENING MAMMOGRAM: ICD-10-CM

## 2020-08-07 PROCEDURE — 88175 CYTOPATH C/V AUTO FLUID REDO: CPT

## 2020-08-07 PROCEDURE — 99396 PR PREVENTIVE VISIT,EST,40-64: ICD-10-PCS | Mod: S$GLB,,, | Performed by: OBSTETRICS & GYNECOLOGY

## 2020-08-07 PROCEDURE — 87624 HPV HI-RISK TYP POOLED RSLT: CPT

## 2020-08-07 PROCEDURE — 3008F BODY MASS INDEX DOCD: CPT | Mod: CPTII,S$GLB,, | Performed by: OBSTETRICS & GYNECOLOGY

## 2020-08-07 PROCEDURE — 99999 PR PBB SHADOW E&M-EST. PATIENT-LVL III: CPT | Mod: PBBFAC,,, | Performed by: OBSTETRICS & GYNECOLOGY

## 2020-08-07 PROCEDURE — 99396 PREV VISIT EST AGE 40-64: CPT | Mod: S$GLB,,, | Performed by: OBSTETRICS & GYNECOLOGY

## 2020-08-07 PROCEDURE — 3008F PR BODY MASS INDEX (BMI) DOCUMENTED: ICD-10-PCS | Mod: CPTII,S$GLB,, | Performed by: OBSTETRICS & GYNECOLOGY

## 2020-08-07 PROCEDURE — 99999 PR PBB SHADOW E&M-EST. PATIENT-LVL III: ICD-10-PCS | Mod: PBBFAC,,, | Performed by: OBSTETRICS & GYNECOLOGY

## 2020-08-07 RX ORDER — ALUMINUM CHLORIDE 20 %
20 SOLUTION, NON-ORAL TOPICAL
COMMUNITY
Start: 2017-08-02 | End: 2020-11-09

## 2020-08-07 RX ORDER — FLUCONAZOLE 150 MG/1
150 TABLET ORAL ONCE
Qty: 1 TABLET | Refills: 0 | Status: CANCELLED | OUTPATIENT
Start: 2020-08-07 | End: 2020-08-07

## 2020-08-07 NOTE — PROGRESS NOTES
HISTORY OF PRESENT ILLNESS:  Patient with long history of uterine fibroids and menorrhagia with first two days using 8-10 pads per day with clotting.  Patient does report some increase in uterine size over the last year. No SOB or CP, some discomfort with bending and stooping.   Hx of myomectomy X 2     Sophie Courtney is a 39 y.o. female, , Patient's last menstrual period was 07/15/2020.,  presents for a routine exam and has no complaints.    Past Medical History:   Diagnosis Date    Hyperlipidemia     Hypertension        Past Surgical History:   Procedure Laterality Date    ACHILLES TENDON SURGERY Left     KNEE ARTHROSCOPY W/ ACL RECONSTRUCTION Right     MYOMECTOMY      Seven uterine fibroids, largest 11 cm in size     MYOMECTOMY  2014    The specimen weighs 779 g and consists of 27 rounded pieces of rubbery tan-pink tissue. The nodules vary from    WISDOM TOOTH EXTRACTION  2013 &         MEDICATIONS AND ALLERGIES:      Current Outpatient Medications:     atorvastatin (LIPITOR) 20 MG tablet, Take 1 tablet (20 mg total) by mouth once daily., Disp: 30 tablet, Rfl: 3    chlorthalidone (HYGROTEN) 25 MG Tab, Take 1 tablet (25 mg total) by mouth once daily., Disp: 30 tablet, Rfl: 3    aluminum chloride (DRYSOL DAB-O-MATIC) 20 % external solution, 20 %., Disp: , Rfl:     clotrimazole-betamethasone 1-0.05% (LOTRISONE) cream, , Disp: , Rfl:     fluconazole (DIFLUCAN) 150 MG Tab, TK ONE T     PO ONCE (Patient not taking: Reported on 2020), Disp: 1 tablet, Rfl: 3    Review of patient's allergies indicates:   Allergen Reactions    Crestor [rosuvastatin]     Neosporin [hydrocortisone]     Nickel sutures [surgical stainless steel] Hives       Family History   Problem Relation Age of Onset    Hypertension Father     Hypertension Mother     Stroke Maternal Aunt     Breast cancer Paternal Aunt 60       Social History     Socioeconomic History    Marital status: Single      Spouse name: Not on file    Number of children: Not on file    Years of education: Not on file    Highest education level: Not on file   Occupational History    Not on file   Social Needs    Financial resource strain: Not on file    Food insecurity     Worry: Not on file     Inability: Not on file    Transportation needs     Medical: Not on file     Non-medical: Not on file   Tobacco Use    Smoking status: Never Smoker    Smokeless tobacco: Never Used   Substance and Sexual Activity    Alcohol use: No    Drug use: No    Sexual activity: Never   Lifestyle    Physical activity     Days per week: Not on file     Minutes per session: Not on file    Stress: Not on file   Relationships    Social connections     Talks on phone: Not on file     Gets together: Not on file     Attends Mormonism service: Not on file     Active member of club or organization: Not on file     Attends meetings of clubs or organizations: Not on file     Relationship status: Not on file   Other Topics Concern    Not on file   Social History Narrative    Not on file       COMPREHENSIVE GYN HISTORY:  PAP History: Denies abnormal Paps.  Infection History: Denies STDs. Denies PID.  Benign History: Denies uterine fibroids. Denies ovarian cysts. Denies endometriosis. Denies other conditions.  Cancer History: Denies cervical cancer. Denies uterine cancer or hyperplasia. Denies ovarian cancer. Denies vulvar cancer or pre-cancer. Denies vaginal cancer or pre-cancer. Denies breast cancer. Denies colon cancer.  Sexual Activity History: Reports currently being sexually active  Menstrual History: Monthly. Mod then light flow.   Dysmenorrhea History: Reports mild dysmenorrhea.       ROS:  GENERAL: No weight changes. No swelling. No fatigue. No fever.  CARDIOVASCULAR: No chest pain. No shortness of breath. No leg cramps.   NEUROLOGICAL: No headaches. No vision changes.  BREASTS: No pain. No lumps. No discharge.  ABDOMEN: No pain. No nausea. No  "vomiting. No diarrhea. No constipation.  REPRODUCTIVE: No abnormal bleeding.   VULVA: No pain. No lesions. No itching.  VAGINA: No relaxation. No itching. No odor. No discharge. No lesions.  URINARY: No incontinence. No nocturia. No frequency. No dysuria.    Ht 5' 4" (1.626 m)   Wt 80 kg (176 lb 5.9 oz)   LMP 07/15/2020   BMI 30.27 kg/m²     PE:  APPEARANCE: Well nourished, well developed, in no acute distress.  AFFECT: WNL, alert and oriented x 3.  SKIN: No acne or hirsutism.  NECK: Neck symmetric, without masses or thyromegaly.  NODES: No inguinal, cervical, axillary or femoral lymph node enlargement.  CHEST: Good respiratory effort.   ABDOMEN: Soft. No tenderness or masses. No hepatosplenomegaly. No hernias.  BREASTS: Symmetrical, no skin changes, visible lesions, palpable masses or nipple discharge bilaterally.  PELVIC: External female genitalia without lesions.  Female hair distribution. Adequate perineal body, Normal urethral meatus. Vagina moist and well rugated without lesions or discharge.  No significant cystocele or rectocele present. Cervix pink without lesions, discharge or tenderness. Uterus is 24-26 week size, regular, mobile and nontender. Adnexa without masses or tenderness.  EXTREMITIES: No edema    DIAGNOSIS:  1. Uterine leiomyoma, unspecified location    2. Visit for screening mammogram    3. Visit for annual health examination        PLAN:    Orders Placed This Encounter    HPV High Risk Genotypes, PCR    US Pelvis Comp with Transvag NON-OB (xpd    MRI Pelvis W WO Contrast    Mammo Digital Screening Bilat w/ Zackary    Liquid-Based Pap Smear, Screening       COUNSELING:  The patient was counseled today on:  -A.C.S. Pap and pelvic exam guidelines (pap every 3 years), recomendations for yearly mammogram;    Discussion with patient over the last few years regarding surgical treatment for uterine fibroids with hysterectomy, which was declined.   Patient with increase in size over the last year. " Would strongly urge surgical management. Patient to decide after imaging performed.     FOLLOW-UP with me after MRI of the pelvis.

## 2020-08-14 LAB
HPV HR 12 DNA SPEC QL NAA+PROBE: NEGATIVE
HPV16 AG SPEC QL: NEGATIVE
HPV18 DNA SPEC QL NAA+PROBE: NEGATIVE

## 2020-08-19 ENCOUNTER — TELEPHONE (OUTPATIENT)
Dept: OBSTETRICS AND GYNECOLOGY | Facility: CLINIC | Age: 40
End: 2020-08-19

## 2020-08-19 NOTE — TELEPHONE ENCOUNTER
----- Message from Haley Rogers sent at 8/18/2020  1:01 PM CDT -----  Contact: SYLVIA BARRAGAN [[id number]]  Name of Who is Calling: SYLVIA BARRAGAN [[id number]]    What is the request in detail: Patient would like a call back to discuss ultrasound. Please call       Can the clinic reply by [facility]: no      What Number to Call Back if not in [facility]: [phone] (home)

## 2020-08-20 ENCOUNTER — HOSPITAL ENCOUNTER (OUTPATIENT)
Dept: RADIOLOGY | Facility: OTHER | Age: 40
Discharge: HOME | End: 2020-08-20
Attending: OBSTETRICS & GYNECOLOGY
Payer: COMMERCIAL

## 2020-08-20 DIAGNOSIS — D25.9 UTERINE LEIOMYOMA, UNSPECIFIED LOCATION: ICD-10-CM

## 2020-08-20 DIAGNOSIS — Z12.31 VISIT FOR SCREENING MAMMOGRAM: ICD-10-CM

## 2020-08-20 PROCEDURE — 77067 SCR MAMMO BI INCL CAD: CPT | Mod: TC

## 2020-08-20 PROCEDURE — 77067 SCR MAMMO BI INCL CAD: CPT | Mod: 26,,, | Performed by: RADIOLOGY

## 2020-08-20 PROCEDURE — 77063 BREAST TOMOSYNTHESIS BI: CPT | Mod: 26,,, | Performed by: RADIOLOGY

## 2020-08-20 PROCEDURE — 77063 MAMMO DIGITAL SCREENING BILAT WITH TOMOSYNTHESIS_CAD: ICD-10-PCS | Mod: 26,,, | Performed by: RADIOLOGY

## 2020-08-20 PROCEDURE — 77067 MAMMO DIGITAL SCREENING BILAT WITH TOMOSYNTHESIS_CAD: ICD-10-PCS | Mod: 26,,, | Performed by: RADIOLOGY

## 2020-08-21 ENCOUNTER — TELEPHONE (OUTPATIENT)
Dept: OBSTETRICS AND GYNECOLOGY | Facility: CLINIC | Age: 40
End: 2020-08-21

## 2020-08-21 DIAGNOSIS — I10 HYPERTENSION, UNSPECIFIED TYPE: Primary | ICD-10-CM

## 2020-08-21 LAB
FINAL PATHOLOGIC DIAGNOSIS: NORMAL
Lab: NORMAL

## 2020-08-26 ENCOUNTER — HOSPITAL ENCOUNTER (OUTPATIENT)
Dept: RADIOLOGY | Facility: OTHER | Age: 40
Discharge: HOME | End: 2020-08-26
Attending: OBSTETRICS & GYNECOLOGY
Payer: COMMERCIAL

## 2020-08-26 ENCOUNTER — OFFICE VISIT (OUTPATIENT)
Dept: FAMILY MEDICINE | Facility: CLINIC | Age: 40
End: 2020-08-26
Payer: COMMERCIAL

## 2020-08-26 VITALS
BODY MASS INDEX: 30.11 KG/M2 | HEIGHT: 64 IN | OXYGEN SATURATION: 97 % | SYSTOLIC BLOOD PRESSURE: 150 MMHG | DIASTOLIC BLOOD PRESSURE: 92 MMHG | WEIGHT: 176.38 LBS | TEMPERATURE: 100 F | HEART RATE: 74 BPM

## 2020-08-26 DIAGNOSIS — I10 ESSENTIAL HYPERTENSION: Primary | ICD-10-CM

## 2020-08-26 DIAGNOSIS — D25.9 UTERINE LEIOMYOMA, UNSPECIFIED LOCATION: ICD-10-CM

## 2020-08-26 DIAGNOSIS — T46.906D: ICD-10-CM

## 2020-08-26 DIAGNOSIS — E78.2 MIXED HYPERLIPIDEMIA: ICD-10-CM

## 2020-08-26 DIAGNOSIS — Z13.29 SCREENING FOR ENDOCRINE DISORDER: ICD-10-CM

## 2020-08-26 DIAGNOSIS — D25.1 INTRAMURAL LEIOMYOMA OF UTERUS: ICD-10-CM

## 2020-08-26 DIAGNOSIS — Z13.0 SCREENING FOR IRON DEFICIENCY ANEMIA: ICD-10-CM

## 2020-08-26 DIAGNOSIS — Z91.128: ICD-10-CM

## 2020-08-26 LAB
ALBUMIN SERPL-MCNC: 4.4 G/DL (ref 3.6–5.1)
ALBUMIN/CREAT UR: 3 MCG/MG CREAT
ALBUMIN/GLOB SERPL: 1.7 (CALC) (ref 1–2.5)
ALP SERPL-CCNC: 39 U/L (ref 31–125)
ALT SERPL-CCNC: 8 U/L (ref 6–29)
APPEARANCE UR: CLEAR
AST SERPL-CCNC: 12 U/L (ref 10–30)
BASOPHILS # BLD AUTO: 8 CELLS/UL (ref 0–200)
BASOPHILS NFR BLD AUTO: 0.2 %
BILIRUB SERPL-MCNC: 1.4 MG/DL (ref 0.2–1.2)
BILIRUB UR QL STRIP: NEGATIVE
BUN SERPL-MCNC: 12 MG/DL (ref 7–25)
BUN/CREAT SERPL: ABNORMAL (CALC) (ref 6–22)
CALCIUM SERPL-MCNC: 9.3 MG/DL (ref 8.6–10.2)
CHLORIDE SERPL-SCNC: 102 MMOL/L (ref 98–110)
CHOLEST SERPL-MCNC: 236 MG/DL
CHOLEST/HDLC SERPL: 3.8 (CALC)
CO2 SERPL-SCNC: 30 MMOL/L (ref 20–32)
COLOR UR: YELLOW
CREAT SERPL-MCNC: 0.73 MG/DL (ref 0.5–1.1)
CREAT UR-MCNC: 225 MG/DL (ref 20–275)
EOSINOPHIL # BLD AUTO: 40 CELLS/UL (ref 15–500)
EOSINOPHIL NFR BLD AUTO: 1 %
ERYTHROCYTE [DISTWIDTH] IN BLOOD BY AUTOMATED COUNT: 12.4 % (ref 11–15)
GFRSERPLBLD MDRD-ARVRAT: 102 ML/MIN/1.73M2
GLOBULIN SER CALC-MCNC: 2.6 G/DL (CALC) (ref 1.9–3.7)
GLUCOSE SERPL-MCNC: 80 MG/DL (ref 65–99)
GLUCOSE UR QL STRIP: NEGATIVE
HCT VFR BLD AUTO: 40.3 % (ref 35–45)
HDLC SERPL-MCNC: 62 MG/DL
HGB BLD-MCNC: 13.7 G/DL (ref 11.7–15.5)
HGB UR QL STRIP: NEGATIVE
KETONES UR QL STRIP: NEGATIVE
LDLC SERPL CALC-MCNC: 160 MG/DL (CALC)
LEUKOCYTE ESTERASE UR QL STRIP: NEGATIVE
LYMPHOCYTES # BLD AUTO: 1884 CELLS/UL (ref 850–3900)
LYMPHOCYTES NFR BLD AUTO: 47.1 %
MCH RBC QN AUTO: 31.6 PG (ref 27–33)
MCHC RBC AUTO-ENTMCNC: 34 G/DL (ref 32–36)
MCV RBC AUTO: 92.9 FL (ref 80–100)
MICROALBUMIN UR-MCNC: 0.7 MG/DL
MONOCYTES # BLD AUTO: 336 CELLS/UL (ref 200–950)
MONOCYTES NFR BLD AUTO: 8.4 %
NEUTROPHILS # BLD AUTO: 1732 CELLS/UL (ref 1500–7800)
NEUTROPHILS NFR BLD AUTO: 43.3 %
NITRITE UR QL STRIP: NEGATIVE
NONHDLC SERPL-MCNC: 174 MG/DL (CALC)
PH UR STRIP: 6 [PH] (ref 5–8)
PLATELET # BLD AUTO: 233 THOUSAND/UL (ref 140–400)
PMV BLD REES-ECKER: 10.8 FL (ref 7.5–12.5)
POTASSIUM SERPL-SCNC: 4 MMOL/L (ref 3.5–5.3)
PROT SERPL-MCNC: 7 G/DL (ref 6.1–8.1)
PROT UR QL STRIP: NEGATIVE
RBC # BLD AUTO: 4.34 MILLION/UL (ref 3.8–5.1)
SODIUM SERPL-SCNC: 139 MMOL/L (ref 135–146)
SP GR UR STRIP: 1.03 (ref 1–1.03)
TRIGL SERPL-MCNC: 52 MG/DL
WBC # BLD AUTO: 4 THOUSAND/UL (ref 3.8–10.8)

## 2020-08-26 PROCEDURE — 3077F PR MOST RECENT SYSTOLIC BLOOD PRESSURE >= 140 MM HG: ICD-10-PCS | Mod: S$GLB,,, | Performed by: FAMILY MEDICINE

## 2020-08-26 PROCEDURE — 3080F DIAST BP >= 90 MM HG: CPT | Mod: S$GLB,,, | Performed by: FAMILY MEDICINE

## 2020-08-26 PROCEDURE — 25500020 PHARM REV CODE 255: Performed by: OBSTETRICS & GYNECOLOGY

## 2020-08-26 PROCEDURE — 99214 OFFICE O/P EST MOD 30 MIN: CPT | Mod: S$GLB,,, | Performed by: FAMILY MEDICINE

## 2020-08-26 PROCEDURE — 3080F PR MOST RECENT DIASTOLIC BLOOD PRESSURE >= 90 MM HG: ICD-10-PCS | Mod: S$GLB,,, | Performed by: FAMILY MEDICINE

## 2020-08-26 PROCEDURE — 72197 MRI PELVIS W/O & W/DYE: CPT | Mod: 26,,, | Performed by: INTERNAL MEDICINE

## 2020-08-26 PROCEDURE — 3077F SYST BP >= 140 MM HG: CPT | Mod: S$GLB,,, | Performed by: FAMILY MEDICINE

## 2020-08-26 PROCEDURE — 72197 MRI PELVIS W/O & W/DYE: CPT | Mod: TC

## 2020-08-26 PROCEDURE — 3008F PR BODY MASS INDEX (BMI) DOCUMENTED: ICD-10-PCS | Mod: S$GLB,,, | Performed by: FAMILY MEDICINE

## 2020-08-26 PROCEDURE — 99214 PR OFFICE/OUTPT VISIT, EST, LEVL IV, 30-39 MIN: ICD-10-PCS | Mod: S$GLB,,, | Performed by: FAMILY MEDICINE

## 2020-08-26 PROCEDURE — 72197 MRI FEMALE PELVIS W W/O CONTRAST: ICD-10-PCS | Mod: 26,,, | Performed by: INTERNAL MEDICINE

## 2020-08-26 PROCEDURE — A9585 GADOBUTROL INJECTION: HCPCS | Performed by: OBSTETRICS & GYNECOLOGY

## 2020-08-26 PROCEDURE — 3008F BODY MASS INDEX DOCD: CPT | Mod: S$GLB,,, | Performed by: FAMILY MEDICINE

## 2020-08-26 RX ORDER — GADOBUTROL 604.72 MG/ML
7.5 INJECTION INTRAVENOUS
Status: COMPLETED | OUTPATIENT
Start: 2020-08-26 | End: 2020-08-26

## 2020-08-26 RX ADMIN — GADOBUTROL 7.5 ML: 604.72 INJECTION INTRAVENOUS at 01:08

## 2020-08-26 NOTE — PROGRESS NOTES
SUBJECTIVE:    Patient ID: Sophie Courtney is a 39 y.o. female.    Chief Complaint: Hypertension (follow up) and Bottles not brought    Pt has not been in a while.   Has hx of HTN and HLD.    Doesn't take BP med daily as its a diuretic and goes to the restroom a lot.  Has not been taking her cholesterol med.     Exercises on and off about twice a week.    Pt has an MRI today for her fibroid.  Her GYN wants its removed because they are high and sometimes she has trouble breathing.  It remains painful, especially during her menses.    Had labs done.       Office Visit on 08/07/2020   Component Date Value Ref Range Status    Final Pathologic Diagnosis 08/07/2020    Final                    Value:Specimen Adequacy  Satisfactory for interpretation. Endocervical component is absent.  Adel Category  Negative for intraepithelial lesion or malignancy.      Disclaimer 08/07/2020    Final                    Value:The Pap smear is a screening test that aids in the detection of cervical  cancer and cancer precursors. Both false positive and false negative results  can occur. The test should be used at regular intervals, and positive results  should be confirmed before definitive therapy.  This liquid based specimen is processed using the  or  Thin PrepPAP  System. This specimen has been analyzed by the ThinPrep Imaging System  ([employer]), an automated imaging and review system which assists  the laboratory in evaluating cells on ThinPrep PAP tests. Following automated  imaging, selected fields from every slide are reviewed by a cytotechnologist  and/or pathologist.      HPV other High Risk types, PCR 08/07/2020 Negative  Negative Final    HPV High Risk type 16, PCR 08/07/2020 Negative  Negative Final    HPV High Risk type 18, PCR 08/07/2020 Negative  Negative Final   Telephone on 07/28/2020   Component Date Value Ref Range Status    WBC 08/25/2020 4.0  3.8 - 10.8 Thousand/uL Final     RBC 08/25/2020 4.34  3.80 - 5.10 Million/uL Final    Hemoglobin 08/25/2020 13.7  11.7 - 15.5 g/dL Final    Hematocrit 08/25/2020 40.3  35.0 - 45.0 % Final    Mean Corpuscular Volume 08/25/2020 92.9  80.0 - 100.0 fL Final    Mean Corpuscular Hemoglobin 08/25/2020 31.6  27.0 - 33.0 pg Final    Mean Corpuscular Hemoglobin Conc 08/25/2020 34.0  32.0 - 36.0 g/dL Final    RDW 08/25/2020 12.4  11.0 - 15.0 % Final    Platelets 08/25/2020 233  140 - 400 Thousand/uL Final    MPV 08/25/2020 10.8  7.5 - 12.5 fL Final    Neutrophils Absolute 08/25/2020 1,732  1,500 - 7,800 cells/uL Final    Lymph # 08/25/2020 1,884  850 - 3,900 cells/uL Final    Mono # 08/25/2020 336  200 - 950 cells/uL Final    Eos # 08/25/2020 40  15 - 500 cells/uL Final    Baso # 08/25/2020 8  0 - 200 cells/uL Final    Neutrophils Relative 08/25/2020 43.3  % Final    Lymph% 08/25/2020 47.1  % Final    Mono% 08/25/2020 8.4  % Final    Eosinophil% 08/25/2020 1.0  % Final    Basophil% 08/25/2020 0.2  % Final    Creatinine, Random Ur 08/25/2020 225  20 - 275 mg/dL Final    Microalb, Ur 08/25/2020 0.7  See Note: mg/dL Final    Microalb Creat Ratio 08/25/2020 3  <30 mcg/mg creat Final    Glucose 08/25/2020 80  65 - 99 mg/dL Final    BUN, Bld 08/25/2020 12  7 - 25 mg/dL Final    Creatinine 08/25/2020 0.73  0.50 - 1.10 mg/dL Final    eGFR if non African American 08/25/2020 102  > OR = 60 mL/min/1.73m2 Final    eGFR if  08/25/2020 119  > OR = 60 mL/min/1.73m2 Final    BUN/Creatinine Ratio 08/25/2020 NOT APPLICABLE  6 - 22 (calc) Final    Sodium 08/25/2020 139  135 - 146 mmol/L Final    Potassium 08/25/2020 4.0  3.5 - 5.3 mmol/L Final    Chloride 08/25/2020 102  98 - 110 mmol/L Final    CO2 08/25/2020 30  20 - 32 mmol/L Final    Calcium 08/25/2020 9.3  8.6 - 10.2 mg/dL Final    Total Protein 08/25/2020 7.0  6.1 - 8.1 g/dL Final    Albumin 08/25/2020 4.4  3.6 - 5.1 g/dL Final    Globulin, Total 08/25/2020 2.6  1.9 -  3.7 g/dL (calc) Final    Albumin/Globulin Ratio 08/25/2020 1.7  1.0 - 2.5 (calc) Final    Total Bilirubin 08/25/2020 1.4* 0.2 - 1.2 mg/dL Final    Alkaline Phosphatase 08/25/2020 39  31 - 125 U/L Final    AST 08/25/2020 12  10 - 30 U/L Final    ALT 08/25/2020 8  6 - 29 U/L Final    Cholesterol 08/25/2020 236* <200 mg/dL Final    HDL 08/25/2020 62  > OR = 50 mg/dL Final    Triglycerides 08/25/2020 52  <150 mg/dL Final    LDL Cholesterol 08/25/2020 160* mg/dL (calc) Final    Hdl/Cholesterol Ratio 08/25/2020 3.8  <5.0 (calc) Final    Non HDL Chol. (LDL+VLDL) 08/25/2020 174* <130 mg/dL (calc) Final    Color, UA 08/25/2020 YELLOW  YELLOW Final    Appearance, UA 08/25/2020 CLEAR  CLEAR Final    Specific [place], UA 08/25/2020 1.026  1.001 - 1.035 Final    pH, UA 08/25/2020 6.0  5.0 - 8.0 Final    Glucose, UA 08/25/2020 NEGATIVE  NEGATIVE Final    Bilirubin, UA 08/25/2020 NEGATIVE  NEGATIVE Final    Ketones, UA 08/25/2020 NEGATIVE  NEGATIVE Final    Occult Blood UA 08/25/2020 NEGATIVE  NEGATIVE Final    Protein, UA 08/25/2020 NEGATIVE  NEGATIVE Final    Nitrite, UA 08/25/2020 NEGATIVE  NEGATIVE Final    Leukocytes, UA 08/25/2020 NEGATIVE  NEGATIVE Final       Past Medical History:   Diagnosis Date    Hyperlipidemia     Hypertension      Social History     Socioeconomic History    Marital status: Single     Spouse name: Not on file    Number of children: Not on file    Years of education: Not on file    Highest education level: Not on file   Occupational History    Not on file   Social Needs    Financial resource strain: Not very hard    Food insecurity     Worry: Never true     Inability: Never true    Transportation needs     Medical: No     Non-medical: No   Tobacco Use    Smoking status: Never Smoker    Smokeless tobacco: Never Used   Substance and Sexual Activity    Alcohol use: No     Frequency: Never     Drinks per session: Patient refused     Binge frequency: Never    Drug use: No     Sexual activity: Never   Lifestyle    Physical activity     Days per week: 3 days     Minutes per session: 30 min    Stress: Not at all   Relationships    Social connections     Talks on phone: More than three times a week     Gets together: Twice a week     Attends Sikh service: Not on file     Active member of club or organization: Yes     Attends meetings of clubs or organizations: More than 4 times per year     Relationship status: Never    Other Topics Concern    Not on file   Social History Narrative    Not on file     Past Surgical History:   Procedure Laterality Date    ACHILLES TENDON SURGERY Left 2003    KNEE ARTHROSCOPY W/ ACL RECONSTRUCTION Right 1999    MYOMECTOMY  2009    Seven uterine fibroids, largest 11 cm in size     MYOMECTOMY  2014    The specimen weighs 779 g and consists of 27 rounded pieces of rubbery tan-pink tissue. The nodules vary from    WISDOM TOOTH EXTRACTION  2013 2013 & 2015      Family History   Problem Relation Age of Onset    Hypertension Father     Hypertension Mother     Stroke Maternal Aunt     Breast cancer Paternal Aunt 60       Review of patient's allergies indicates:   Allergen Reactions    Crestor [rosuvastatin]     Neosporin [hydrocortisone]     Nickel sutures [surgical stainless steel] Hives       Current Outpatient Medications:     atorvastatin (LIPITOR) 20 MG tablet, Take 1 tablet (20 mg total) by mouth once daily., Disp: 30 tablet, Rfl: 3    chlorthalidone (HYGROTEN) 25 MG Tab, Take 1 tablet (25 mg total) by mouth once daily., Disp: 30 tablet, Rfl: 3    aluminum chloride (DRYSOL DAB-O-MATIC) 20 % external solution, 20 %., Disp: , Rfl:     clotrimazole-betamethasone 1-0.05% (LOTRISONE) cream, , Disp: , Rfl:     fluconazole (DIFLUCAN) 150 MG Tab, TK ONE T     PO ONCE, Disp: 1 tablet, Rfl: 3    Review of Systems   Constitutional: Negative for appetite change, fatigue, fever and unexpected weight change.   Respiratory: Negative for  "cough, chest tightness, shortness of breath and wheezing.    Cardiovascular: Negative for chest pain and leg swelling.   Gastrointestinal: Negative for abdominal pain, constipation, nausea and vomiting.        -heartburn   Genitourinary: Positive for menstrual problem. Negative for difficulty urinating, dysuria, frequency and urgency.   Musculoskeletal: Negative for arthralgias, back pain, myalgias and neck pain.   Skin: Negative for rash.   Neurological: Negative for dizziness, weakness, numbness and headaches.   Hematological: Does not bruise/bleed easily.   Psychiatric/Behavioral: Negative for dysphoric mood, sleep disturbance and suicidal ideas. The patient is not nervous/anxious.    All other systems reviewed and are negative.         Objective:      Vitals:    08/26/20 0929   BP: (!) 150/92   Pulse: 74   Temp: 99.6 °F (37.6 °C)   SpO2: 97%   Weight: 80 kg (176 lb 6.4 oz)   Height: 5' 4" (1.626 m)     Physical Exam  Vitals signs reviewed.   Constitutional:       General: She is not in acute distress.     Appearance: Normal appearance. She is well-developed. She is obese.   HENT:      Head: Normocephalic and atraumatic.   Neck:      Musculoskeletal: Neck supple.      Thyroid: No thyromegaly.   Cardiovascular:      Rate and Rhythm: Normal rate and regular rhythm.      Heart sounds: Normal heart sounds. No murmur. No friction rub.   Pulmonary:      Effort: Pulmonary effort is normal.      Breath sounds: Normal breath sounds. No wheezing or rales.   Abdominal:      General: Bowel sounds are normal. There is no distension.      Palpations: Abdomen is soft.      Tenderness: There is no abdominal tenderness.      Comments: Enlarged abdomen, appears about 6m pregnant   Lymphadenopathy:      Cervical: No cervical adenopathy.   Skin:     General: Skin is warm and dry.      Findings: No rash.   Neurological:      Mental Status: She is alert and oriented to person, place, and time.   Psychiatric:         Attention and " Perception: She is attentive.         Speech: Speech normal.         Behavior: Behavior normal.         Thought Content: Thought content normal.         Judgment: Judgment normal.           Assessment:       1. Essential hypertension    2. Mixed hyperlipidemia    3. Intramural leiomyoma of uterus    4. Intentional underdosing of agent primarily affecting cardiovascular system by patient for reason other than financial hardship, subsequent encounter    5. Screening for iron deficiency anemia    6. Screening for endocrine disorder         Plan:       Essential hypertension  Comments:  Uncontrolled. Encouraged to take meds regularly. Will continue to monitor BP  Orders:  -     Comprehensive metabolic panel; Future; Expected date: 08/26/2020  -     Lipid Panel; Future; Expected date: 08/26/2020  -     Microalbumin/creatinine urine ratio; Future; Expected date: 08/26/2020  -     CBC auto differential; Future; Expected date: 08/26/2020  -     TSH w/reflex to FT4; Future; Expected date: 08/26/2020    Mixed hyperlipidemia  Comments:  Uncontrolled. Pt encouraged to take lipitor regularly and repeat labs.  Orders:  -     Comprehensive metabolic panel; Future; Expected date: 08/26/2020  -     Lipid Panel; Future; Expected date: 08/26/2020    Intramural leiomyoma of uterus  Comments:  To f/u with GYN for MRI.  Orders:  -     CBC auto differential; Future; Expected date: 08/26/2020  -     TSH w/reflex to FT4; Future; Expected date: 08/26/2020    Intentional underdosing of agent primarily affecting cardiovascular system by patient for reason other than financial hardship, subsequent encounter  Comments:  Pt encouraged to take meds in order to prevent adverse CV events such as death, MI, CHF, CVA.    Screening for iron deficiency anemia  -     CBC auto differential; Future; Expected date: 08/26/2020    Screening for endocrine disorder  -     TSH w/reflex to FT4; Future; Expected date: 08/26/2020    Other  Lab results discussed and  reviewed with patient.  Labs have been ordered for monitoring of chronic conditions, just before next visit.    Follow up in about 3 months (around 11/26/2020) for HTN, HLD, Lipoma, LABS.        8/30/2020 Rohan Pringle

## 2020-08-28 ENCOUNTER — OFFICE VISIT (OUTPATIENT)
Dept: OBSTETRICS AND GYNECOLOGY | Facility: CLINIC | Age: 40
End: 2020-08-28
Payer: COMMERCIAL

## 2020-08-28 VITALS
WEIGHT: 176.38 LBS | SYSTOLIC BLOOD PRESSURE: 130 MMHG | HEIGHT: 64 IN | DIASTOLIC BLOOD PRESSURE: 80 MMHG | BODY MASS INDEX: 30.11 KG/M2

## 2020-08-28 DIAGNOSIS — D25.9 UTERINE LEIOMYOMA, UNSPECIFIED LOCATION: Primary | ICD-10-CM

## 2020-08-28 DIAGNOSIS — I10 ESSENTIAL HYPERTENSION: ICD-10-CM

## 2020-08-28 PROCEDURE — 99999 PR PBB SHADOW E&M-EST. PATIENT-LVL III: CPT | Mod: PBBFAC,,, | Performed by: OBSTETRICS & GYNECOLOGY

## 2020-08-28 PROCEDURE — 99213 PR OFFICE/OUTPT VISIT, EST, LEVL III, 20-29 MIN: ICD-10-PCS | Mod: S$GLB,,, | Performed by: OBSTETRICS & GYNECOLOGY

## 2020-08-28 PROCEDURE — 3079F DIAST BP 80-89 MM HG: CPT | Mod: CPTII,S$GLB,, | Performed by: OBSTETRICS & GYNECOLOGY

## 2020-08-28 PROCEDURE — 3008F BODY MASS INDEX DOCD: CPT | Mod: CPTII,S$GLB,, | Performed by: OBSTETRICS & GYNECOLOGY

## 2020-08-28 PROCEDURE — 99213 OFFICE O/P EST LOW 20 MIN: CPT | Mod: S$GLB,,, | Performed by: OBSTETRICS & GYNECOLOGY

## 2020-08-28 PROCEDURE — 3075F PR MOST RECENT SYSTOLIC BLOOD PRESS GE 130-139MM HG: ICD-10-PCS | Mod: CPTII,S$GLB,, | Performed by: OBSTETRICS & GYNECOLOGY

## 2020-08-28 PROCEDURE — 3079F PR MOST RECENT DIASTOLIC BLOOD PRESSURE 80-89 MM HG: ICD-10-PCS | Mod: CPTII,S$GLB,, | Performed by: OBSTETRICS & GYNECOLOGY

## 2020-08-28 PROCEDURE — 3075F SYST BP GE 130 - 139MM HG: CPT | Mod: CPTII,S$GLB,, | Performed by: OBSTETRICS & GYNECOLOGY

## 2020-08-28 PROCEDURE — 3008F PR BODY MASS INDEX (BMI) DOCUMENTED: ICD-10-PCS | Mod: CPTII,S$GLB,, | Performed by: OBSTETRICS & GYNECOLOGY

## 2020-08-28 PROCEDURE — 99999 PR PBB SHADOW E&M-EST. PATIENT-LVL III: ICD-10-PCS | Mod: PBBFAC,,, | Performed by: OBSTETRICS & GYNECOLOGY

## 2020-10-19 NOTE — PROGRESS NOTES
HISTORY OF PRESENT ILLNESS:  Patient with long history of uterine fibroids and menorrhagia with first two days using 8-10 pads per day with clotting.  Patient does report some increase in uterine size over the last year. No SOB or CP, some discomfort with bending and stooping.   Hx of myomectomy X 2       Narrative & Impression     EXAMINATION:  MRI FEMALE PELVIS W W/O CONTRAST     CLINICAL HISTORY:  Uterine fibroid suspected;  Leiomyoma of uterus, unspecified     TECHNIQUE:  Ultrasound of the female pelvis was performed with and without contrast.  7.5 cc Gadavist was administered for the contrast enhanced portion of the study.     COMPARISON:  Ultrasound October 2018, MRI June 2016     FINDINGS:  On the localization and other images, there are several small high T2 signal foci within the liver.  The left kidney demonstrates a high T2 signal lesion and multiple high T2 signal foci in the renal sinus.  These are compatible with cysts.  Marrow signal appears similar to prior exam likely reflecting red marrow hyperplasia.     There is marked uterine enlargement, uterus measuring approximately 23.7 cm in maximal craniocaudal dimension and 23.8 by 12 cm in maximal transverse dimension.  Uterus demonstrates multiple intramural, subserosal and submucosal fibroids.  There are various degrees of contrast enhancement and degeneration.  The uterine size has increased compared to the previous MRI exam.  The endometrium is normal in thickness.  There is no thickening of the junctional zone.  The largest fibroid measures 12 x 8.6 x 9.5 cm, along the left fundus of the uterus.     Within the right ovary is a 3.5 cm high T1 signal lesion.     Impression:     Marked uterine enlargement on the basis of multiple leiomyomas with overall uterine size having increased compared to the 2016 MRI.     Right ovarian hemorrhagic cyst or endometrioma.     Left renal cysts.  Additional small high T2 signal foci with limited visualization in the  liver.  These likely represent cyst but can be correlated with ultrasound.        Electronically signed by: Wen Turpin MD  Date:                                            2020  Time:                                           14:29          Sophie Courtney is a 39 y.o. female, , Patient's last menstrual period was 2020.,  presents for a routine exam and has no complaints.    Past Medical History:   Diagnosis Date    Hyperlipidemia     Hypertension        Past Surgical History:   Procedure Laterality Date    ACHILLES TENDON SURGERY Left     KNEE ARTHROSCOPY W/ ACL RECONSTRUCTION Right     MYOMECTOMY  2009    Seven uterine fibroids, largest 11 cm in size     MYOMECTOMY      The specimen weighs 779 g and consists of 27 rounded pieces of rubbery tan-pink tissue. The nodules vary from    WISDOM TOOTH EXTRACTION  2013 &         MEDICATIONS AND ALLERGIES:      Current Outpatient Medications:     aluminum chloride (DRYSOL DAB-O-MATIC) 20 % external solution, 20 %., Disp: , Rfl:     atorvastatin (LIPITOR) 20 MG tablet, Take 1 tablet (20 mg total) by mouth once daily., Disp: 30 tablet, Rfl: 3    chlorthalidone (HYGROTEN) 25 MG Tab, Take 1 tablet (25 mg total) by mouth once daily., Disp: 30 tablet, Rfl: 3    fluconazole (DIFLUCAN) 150 MG Tab, TK ONE T     PO ONCE, Disp: 1 tablet, Rfl: 3    clotrimazole-betamethasone 1-0.05% (LOTRISONE) cream, , Disp: , Rfl:     Review of patient's allergies indicates:   Allergen Reactions    Crestor [rosuvastatin]     Neosporin [hydrocortisone]     Nickel sutures [surgical stainless steel] Hives       Family History   Problem Relation Age of Onset    Hypertension Father     Hypertension Mother     Stroke Maternal Aunt     Breast cancer Paternal Aunt 60       Social History     Socioeconomic History    Marital status: Single     Spouse name: Not on file    Number of children: Not on file    Years of education: Not on  file    Highest education level: Not on file   Occupational History    Not on file   Social Needs    Financial resource strain: Not very hard    Food insecurity     Worry: Never true     Inability: Never true    Transportation needs     Medical: No     Non-medical: No   Tobacco Use    Smoking status: Never Smoker    Smokeless tobacco: Never Used   Substance and Sexual Activity    Alcohol use: No     Frequency: Never     Drinks per session: Patient refused     Binge frequency: Never    Drug use: No    Sexual activity: Never   Lifestyle    Physical activity     Days per week: 3 days     Minutes per session: 30 min    Stress: Not at all   Relationships    Social connections     Talks on phone: More than three times a week     Gets together: Twice a week     Attends Church service: Not on file     Active member of club or organization: Yes     Attends meetings of clubs or organizations: More than 4 times per year     Relationship status: Never    Other Topics Concern    Not on file   Social History Narrative    Not on file       COMPREHENSIVE GYN HISTORY:  PAP History: Denies abnormal Paps.  Infection History: Denies STDs. Denies PID.  Benign History: Denies uterine fibroids. Denies ovarian cysts. Denies endometriosis. Denies other conditions.  Cancer History: Denies cervical cancer. Denies uterine cancer or hyperplasia. Denies ovarian cancer. Denies vulvar cancer or pre-cancer. Denies vaginal cancer or pre-cancer. Denies breast cancer. Denies colon cancer.  Sexual Activity History: Reports currently being sexually active  Menstrual History: Monthly. Mod then light flow.   Dysmenorrhea History: Reports mild dysmenorrhea.       ROS:  GENERAL: No weight changes. No swelling. No fatigue. No fever.  CARDIOVASCULAR: No chest pain. No shortness of breath. No leg cramps.   NEUROLOGICAL: No headaches. No vision changes.  BREASTS: No pain. No lumps. No discharge.  ABDOMEN: No pain. No nausea. No  "vomiting. No diarrhea. No constipation.  REPRODUCTIVE: No abnormal bleeding.   VULVA: No pain. No lesions. No itching.  VAGINA: No relaxation. No itching. No odor. No discharge. No lesions.  URINARY: No incontinence. No nocturia. No frequency. No dysuria.    /80   Ht 5' 4" (1.626 m)   Wt 80 kg (176 lb 5.9 oz)   LMP 08/12/2020   BMI 30.27 kg/m²     PE:  APPEARANCE: Well nourished, well developed, in no acute distress.  AFFECT: WNL, alert and oriented x 3.  Deferred    DIAGNOSIS:  1. Uterine leiomyoma, unspecified location    2. Essential hypertension        PLAN:  COUNSELING:  The patient was counseled today on    Discussion with patient over the last few years regarding surgical treatment for uterine fibroids with hysterectomy, which was declined.   Patient with increase in size over the last year. Would strongly urge surgical management.   Patient stands recommendation to proceed with surgical management but would like to defer decision at this time.     FOLLOW-UP with me in 2 months, patient needs endometrial biopsy    Answers for HPI/ROS submitted by the patient on 8/27/2020   Gynecologic exam  genital itching: No  genital lesions: No  genital odor: No  genital rash: No  missed menses: No  pelvic pain: Yes  vaginal bleeding: No  vaginal discharge: No  Chronicity: recurrent  Onset: more than 1 year ago  Frequency: intermittently  Progression since onset: gradually worsening  Pain severity: moderate  Pregnant now?: No  abdominal pain: Yes  anorexia: No  back pain: No  chills: No  constipation: No  diarrhea: No  discolored urine: No  dysuria: No  fever: No  flank pain: No  frequency: No  headaches: No  hematuria: No  nausea: No  painful intercourse: No  rash: No  urgency: No  vomiting: No  Please select the characteristics of your discharge: : normal  Vaginal bleeding: heavier than menses  Passing clots?: Yes  Passing tissue?: No  Aggravated by: nothing  treatments tried: nothing  Improvement on treatment: " no relief  Sexual activity: not sexually active  Partner with STD symptoms: no  Birth control: abstinence  Menstrual history: irregular  STD: Yes  abdominal surgery: Yes   section: No  Ectopic pregnancy: No  Endometriosis: No  herpes simplex: No  gynecological surgery: Yes  menorrhagia: Yes  metrorrhagia: No  miscarriage: No  ovarian cysts: No  perineal abscess: No  PID: No  terminated pregnancy: No  vaginosis: No

## 2020-10-23 ENCOUNTER — TELEPHONE (OUTPATIENT)
Dept: SURGERY | Facility: CLINIC | Age: 40
End: 2020-10-23

## 2020-10-23 ENCOUNTER — TELEPHONE (OUTPATIENT)
Dept: OBSTETRICS AND GYNECOLOGY | Facility: CLINIC | Age: 40
End: 2020-10-23

## 2020-10-23 NOTE — TELEPHONE ENCOUNTER
Contacted the patient regarding message received from 's office to schedule patient an appointment in the high risk breast clinic.  The patient stated that she wanted to speak with  before scheduling an appointment.  My name and direct number given to the patient to contact myself back regarding scheduling appointment.  Patient voiced understanding of information given to her.

## 2020-10-23 NOTE — TELEPHONE ENCOUNTER
Scheduled embx, patient requesting a call from Dr. Sandoval because she has some questions, please contact pt

## 2020-11-09 ENCOUNTER — TELEPHONE (OUTPATIENT)
Dept: FAMILY MEDICINE | Facility: CLINIC | Age: 40
End: 2020-11-09

## 2020-11-09 ENCOUNTER — PROCEDURE VISIT (OUTPATIENT)
Dept: OBSTETRICS AND GYNECOLOGY | Facility: CLINIC | Age: 40
End: 2020-11-09
Payer: COMMERCIAL

## 2020-11-09 VITALS — SYSTOLIC BLOOD PRESSURE: 134 MMHG | DIASTOLIC BLOOD PRESSURE: 80 MMHG

## 2020-11-09 DIAGNOSIS — N92.0 MENORRHAGIA WITH REGULAR CYCLE: Primary | ICD-10-CM

## 2020-11-09 DIAGNOSIS — D25.9 UTERINE LEIOMYOMA, UNSPECIFIED LOCATION: ICD-10-CM

## 2020-11-09 LAB
B-HCG UR QL: NEGATIVE
CTP QC/QA: YES

## 2020-11-09 PROCEDURE — 58100 BIOPSY OF UTERUS LINING: CPT | Mod: ,,, | Performed by: OBSTETRICS & GYNECOLOGY

## 2020-11-09 PROCEDURE — 88305 TISSUE EXAM BY PATHOLOGIST: CPT | Mod: 26,,, | Performed by: PATHOLOGY

## 2020-11-09 PROCEDURE — 88305 TISSUE EXAM BY PATHOLOGIST: ICD-10-PCS | Mod: 26,,, | Performed by: PATHOLOGY

## 2020-11-09 PROCEDURE — 88305 TISSUE EXAM BY PATHOLOGIST: CPT | Performed by: PATHOLOGY

## 2020-11-09 PROCEDURE — 58100 BIOPSY (GYNECOLOGICAL): ICD-10-PCS | Mod: ,,, | Performed by: OBSTETRICS & GYNECOLOGY

## 2020-11-09 NOTE — PROCEDURES
Biopsy (Gynecological)    Date/Time: 11/9/2020 9:45 AM  Performed by: Peace Sandoval MD  Authorized by: Peace Sandoval MD     Consent Done?:  Yes (Written)   Patient was prepped and draped in the normal sterile fashion.  Local anesthesia used?: No      Biopsy Location:  Uterus  Estimated blood loss (cc):  10   Patient tolerated the procedure well with no immediate complications.     Pelvic rest for 2 weeks. Bleeding, pain and fever precautions given.           Patient with long history of uterine fibroids and menorrhagia with first two days using 8-10 pads per day with clotting.  Patient does report some increase in uterine size over the last year. No SOB or CP, some major discomfort with bending and stooping. Difficult to breathe secondary to uterine size, needs to sit a certain way for comfort and reports significant back pain.   Hx of myomectomy X 2     Patient counseled in detail on options available for menorrhagia and uterine fibroids. Medical therapy and surgical options discussed in detail. Patient desires to proceed with definitive therapy with hysterectomy. A discussion was also held in reference to ovarian hormone production, a woman's lifetime risk of developing ovarian cancer and the possible need for reoperation for ovarian pathology. She desires NOT to have ovaries removed at the time of surgery.

## 2020-11-12 ENCOUNTER — TELEPHONE (OUTPATIENT)
Dept: OBSTETRICS AND GYNECOLOGY | Facility: CLINIC | Age: 40
End: 2020-11-12

## 2020-11-12 DIAGNOSIS — Z01.818 PREOP TESTING: Primary | ICD-10-CM

## 2020-11-12 LAB
FINAL PATHOLOGIC DIAGNOSIS: NORMAL
GROSS: NORMAL

## 2020-11-13 NOTE — PROCEDURES
Biopsy (Gynecological)    Date/Time: 11/9/2020 9:45 AM  Performed by: Peace Sandoval MD  Authorized by: Peace Sandoval MD     Consent Done?:  Yes (Written)   Patient was prepped and draped in the normal sterile fashion.  Local anesthesia used?: No      Biopsy Location:  Uterus  Estimated blood loss (cc):  10   Patient tolerated the procedure well with no immediate complications.     Pelvic rest for 2 weeks. Bleeding, pain and fever precautions given.

## 2020-11-16 ENCOUNTER — PATIENT MESSAGE (OUTPATIENT)
Dept: OBSTETRICS AND GYNECOLOGY | Facility: CLINIC | Age: 40
End: 2020-11-16

## 2020-11-17 ENCOUNTER — TELEPHONE (OUTPATIENT)
Dept: OBSTETRICS AND GYNECOLOGY | Facility: CLINIC | Age: 40
End: 2020-11-17

## 2020-11-17 NOTE — TELEPHONE ENCOUNTER
----- Message from Kang Camarillo, Patient Care Assistant sent at 11/17/2020  1:02 PM CST -----  Can the clinic reply in [facility]: No    Please refill the medication(s) listed below. The patient can be reached at this phone number once it is called into the pharmacy.[phone]    Medication #1fluconazole (DIFLUCAN) 150 MG Tab    Medication #2    Preferred Pharmacy:   [id number] [place] [address] [address]  Requesting for Rx to be resent stating pharmacy didn't receive it.

## 2021-01-12 ENCOUNTER — OFFICE VISIT (OUTPATIENT)
Dept: OBSTETRICS AND GYNECOLOGY | Facility: CLINIC | Age: 41
End: 2021-01-12
Payer: COMMERCIAL

## 2021-01-12 VITALS
DIASTOLIC BLOOD PRESSURE: 91 MMHG | HEIGHT: 64 IN | WEIGHT: 180.75 LBS | SYSTOLIC BLOOD PRESSURE: 149 MMHG | BODY MASS INDEX: 30.86 KG/M2

## 2021-01-12 DIAGNOSIS — D25.9 UTERINE LEIOMYOMA, UNSPECIFIED LOCATION: Primary | ICD-10-CM

## 2021-01-12 DIAGNOSIS — R10.2 PELVIC PAIN: ICD-10-CM

## 2021-01-12 PROCEDURE — 99999 PR PBB SHADOW E&M-EST. PATIENT-LVL III: CPT | Mod: PBBFAC,,, | Performed by: OBSTETRICS & GYNECOLOGY

## 2021-01-12 PROCEDURE — 1126F PR PAIN SEVERITY QUANTIFIED, NO PAIN PRESENT: ICD-10-PCS | Mod: S$GLB,,, | Performed by: OBSTETRICS & GYNECOLOGY

## 2021-01-12 PROCEDURE — 99999 PR PBB SHADOW E&M-EST. PATIENT-LVL III: ICD-10-PCS | Mod: PBBFAC,,, | Performed by: OBSTETRICS & GYNECOLOGY

## 2021-01-12 PROCEDURE — 3008F PR BODY MASS INDEX (BMI) DOCUMENTED: ICD-10-PCS | Mod: CPTII,S$GLB,, | Performed by: OBSTETRICS & GYNECOLOGY

## 2021-01-12 PROCEDURE — 1126F AMNT PAIN NOTED NONE PRSNT: CPT | Mod: S$GLB,,, | Performed by: OBSTETRICS & GYNECOLOGY

## 2021-01-12 PROCEDURE — 3008F BODY MASS INDEX DOCD: CPT | Mod: CPTII,S$GLB,, | Performed by: OBSTETRICS & GYNECOLOGY

## 2021-01-12 PROCEDURE — 99499 NO LOS: ICD-10-PCS | Mod: S$GLB,,, | Performed by: OBSTETRICS & GYNECOLOGY

## 2021-01-12 PROCEDURE — 99499 UNLISTED E&M SERVICE: CPT | Mod: S$GLB,,, | Performed by: OBSTETRICS & GYNECOLOGY

## 2021-02-24 ENCOUNTER — HOSPITAL ENCOUNTER (OUTPATIENT)
Dept: PREADMISSION TESTING | Facility: OTHER | Age: 41
Discharge: HOME | End: 2021-02-24
Attending: OBSTETRICS & GYNECOLOGY
Payer: COMMERCIAL

## 2021-02-24 ENCOUNTER — ANESTHESIA EVENT (OUTPATIENT)
Dept: SURGERY | Facility: OTHER | Age: 41
DRG: 743 | End: 2021-02-24
Payer: COMMERCIAL

## 2021-02-24 ENCOUNTER — OFFICE VISIT (OUTPATIENT)
Dept: OBSTETRICS AND GYNECOLOGY | Facility: CLINIC | Age: 41
End: 2021-02-24
Payer: COMMERCIAL

## 2021-02-24 VITALS
HEART RATE: 75 BPM | HEIGHT: 64 IN | WEIGHT: 180 LBS | TEMPERATURE: 97 F | DIASTOLIC BLOOD PRESSURE: 92 MMHG | OXYGEN SATURATION: 98 % | BODY MASS INDEX: 30.73 KG/M2 | SYSTOLIC BLOOD PRESSURE: 158 MMHG

## 2021-02-24 VITALS
BODY MASS INDEX: 31.18 KG/M2 | WEIGHT: 181.69 LBS | SYSTOLIC BLOOD PRESSURE: 140 MMHG | DIASTOLIC BLOOD PRESSURE: 86 MMHG

## 2021-02-24 DIAGNOSIS — N92.0 MENORRHAGIA WITH REGULAR CYCLE: ICD-10-CM

## 2021-02-24 DIAGNOSIS — D25.9 UTERINE LEIOMYOMA, UNSPECIFIED LOCATION: Primary | ICD-10-CM

## 2021-02-24 DIAGNOSIS — R10.2 PELVIC PAIN: ICD-10-CM

## 2021-02-24 DIAGNOSIS — I10 ESSENTIAL HYPERTENSION: ICD-10-CM

## 2021-02-24 DIAGNOSIS — I10 HYPERTENSION, UNSPECIFIED TYPE: ICD-10-CM

## 2021-02-24 PROCEDURE — 87660 TRICHOMONAS VAGIN DIR PROBE: CPT

## 2021-02-24 PROCEDURE — 1126F PR PAIN SEVERITY QUANTIFIED, NO PAIN PRESENT: ICD-10-PCS | Mod: S$GLB,,, | Performed by: OBSTETRICS & GYNECOLOGY

## 2021-02-24 PROCEDURE — 3077F PR MOST RECENT SYSTOLIC BLOOD PRESSURE >= 140 MM HG: ICD-10-PCS | Mod: CPTII,S$GLB,, | Performed by: OBSTETRICS & GYNECOLOGY

## 2021-02-24 PROCEDURE — 87510 GARDNER VAG DNA DIR PROBE: CPT

## 2021-02-24 PROCEDURE — 1126F AMNT PAIN NOTED NONE PRSNT: CPT | Mod: S$GLB,,, | Performed by: OBSTETRICS & GYNECOLOGY

## 2021-02-24 PROCEDURE — 3079F PR MOST RECENT DIASTOLIC BLOOD PRESSURE 80-89 MM HG: ICD-10-PCS | Mod: CPTII,S$GLB,, | Performed by: OBSTETRICS & GYNECOLOGY

## 2021-02-24 PROCEDURE — 99999 PR PBB SHADOW E&M-EST. PATIENT-LVL II: CPT | Mod: PBBFAC,,, | Performed by: OBSTETRICS & GYNECOLOGY

## 2021-02-24 PROCEDURE — 99214 OFFICE O/P EST MOD 30 MIN: CPT | Mod: S$GLB,,, | Performed by: OBSTETRICS & GYNECOLOGY

## 2021-02-24 PROCEDURE — 99999 PR PBB SHADOW E&M-EST. PATIENT-LVL II: ICD-10-PCS | Mod: PBBFAC,,, | Performed by: OBSTETRICS & GYNECOLOGY

## 2021-02-24 PROCEDURE — 3008F PR BODY MASS INDEX (BMI) DOCUMENTED: ICD-10-PCS | Mod: CPTII,S$GLB,, | Performed by: OBSTETRICS & GYNECOLOGY

## 2021-02-24 PROCEDURE — 3008F BODY MASS INDEX DOCD: CPT | Mod: CPTII,S$GLB,, | Performed by: OBSTETRICS & GYNECOLOGY

## 2021-02-24 PROCEDURE — 3079F DIAST BP 80-89 MM HG: CPT | Mod: CPTII,S$GLB,, | Performed by: OBSTETRICS & GYNECOLOGY

## 2021-02-24 PROCEDURE — 3077F SYST BP >= 140 MM HG: CPT | Mod: CPTII,S$GLB,, | Performed by: OBSTETRICS & GYNECOLOGY

## 2021-02-24 PROCEDURE — 99214 PR OFFICE/OUTPT VISIT, EST, LEVL IV, 30-39 MIN: ICD-10-PCS | Mod: S$GLB,,, | Performed by: OBSTETRICS & GYNECOLOGY

## 2021-02-24 RX ORDER — SODIUM CHLORIDE, SODIUM LACTATE, POTASSIUM CHLORIDE, CALCIUM CHLORIDE 600; 310; 30; 20 MG/100ML; MG/100ML; MG/100ML; MG/100ML
INJECTION, SOLUTION INTRAVENOUS CONTINUOUS
Status: CANCELLED | OUTPATIENT
Start: 2021-02-24

## 2021-02-24 RX ORDER — IBUPROFEN 100 MG/5ML
1000 SUSPENSION, ORAL (FINAL DOSE FORM) ORAL DAILY
COMMUNITY

## 2021-02-24 RX ORDER — ACETAMINOPHEN 500 MG
1000 TABLET ORAL
Status: CANCELLED | OUTPATIENT
Start: 2021-02-24 | End: 2021-02-24

## 2021-02-24 RX ORDER — FAMOTIDINE 20 MG/1
20 TABLET, FILM COATED ORAL
Status: CANCELLED | OUTPATIENT
Start: 2021-02-24 | End: 2021-02-24

## 2021-02-24 RX ORDER — LIDOCAINE HYDROCHLORIDE 10 MG/ML
0.5 INJECTION, SOLUTION EPIDURAL; INFILTRATION; INTRACAUDAL; PERINEURAL ONCE
Status: CANCELLED | OUTPATIENT
Start: 2021-02-24 | End: 2021-02-24

## 2021-02-24 RX ORDER — PREGABALIN 50 MG/1
50 CAPSULE ORAL ONCE
Status: CANCELLED | OUTPATIENT
Start: 2021-02-24 | End: 2021-02-24

## 2021-02-27 LAB
CANDIDA RRNA VAG QL PROBE: NEGATIVE
G VAGINALIS RRNA GENITAL QL PROBE: NEGATIVE
T VAGINALIS RRNA GENITAL QL PROBE: NEGATIVE

## 2021-03-09 ENCOUNTER — HOSPITAL ENCOUNTER (OUTPATIENT)
Dept: PREADMISSION TESTING | Facility: OTHER | Age: 41
Discharge: HOME | DRG: 743 | End: 2021-03-09
Attending: OBSTETRICS & GYNECOLOGY
Payer: COMMERCIAL

## 2021-03-09 DIAGNOSIS — Z01.818 PREOP TESTING: ICD-10-CM

## 2021-03-09 PROCEDURE — U0003 INFECTIOUS AGENT DETECTION BY NUCLEIC ACID (DNA OR RNA); SEVERE ACUTE RESPIRATORY SYNDROME CORONAVIRUS 2 (SARS-COV-2) (CORONAVIRUS DISEASE [COVID-19]), AMPLIFIED PROBE TECHNIQUE, MAKING USE OF HIGH THROUGHPUT TECHNOLOGIES AS DESCRIBED BY CMS-2020-01-R: HCPCS | Performed by: OBSTETRICS & GYNECOLOGY

## 2021-03-09 PROCEDURE — U0005 INFEC AGEN DETEC AMPLI PROBE: HCPCS | Performed by: OBSTETRICS & GYNECOLOGY

## 2021-03-10 LAB — SARS-COV-2 RNA RESP QL NAA+PROBE: NOT DETECTED

## 2021-03-11 ENCOUNTER — TELEPHONE (OUTPATIENT)
Dept: OBSTETRICS AND GYNECOLOGY | Facility: CLINIC | Age: 41
End: 2021-03-11

## 2021-03-12 ENCOUNTER — ANESTHESIA (OUTPATIENT)
Dept: SURGERY | Facility: OTHER | Age: 41
DRG: 743 | End: 2021-03-12
Payer: COMMERCIAL

## 2021-03-12 ENCOUNTER — HOSPITAL ENCOUNTER (INPATIENT)
Facility: OTHER | Age: 41
LOS: 2 days | Discharge: HOME | DRG: 743 | End: 2021-03-14
Attending: OBSTETRICS & GYNECOLOGY | Admitting: OBSTETRICS & GYNECOLOGY
Payer: COMMERCIAL

## 2021-03-12 DIAGNOSIS — I10 ESSENTIAL HYPERTENSION: ICD-10-CM

## 2021-03-12 DIAGNOSIS — Z90.710 S/P TAH (TOTAL ABDOMINAL HYSTERECTOMY): Primary | ICD-10-CM

## 2021-03-12 DIAGNOSIS — N92.0 MENORRHAGIA WITH REGULAR CYCLE: ICD-10-CM

## 2021-03-12 DIAGNOSIS — N92.0 MENORRHAGIA: ICD-10-CM

## 2021-03-12 DIAGNOSIS — D25.1 INTRAMURAL LEIOMYOMA OF UTERUS: ICD-10-CM

## 2021-03-12 LAB
B-HCG UR QL: NEGATIVE
CTP QC/QA: YES
POCT GLUCOSE: 95 MG/DL (ref 70–110)

## 2021-03-12 PROCEDURE — 81025 URINE PREGNANCY TEST: CPT | Performed by: ANESTHESIOLOGY

## 2021-03-12 PROCEDURE — 63600175 PHARM REV CODE 636 W HCPCS: Performed by: STUDENT IN AN ORGANIZED HEALTH CARE EDUCATION/TRAINING PROGRAM

## 2021-03-12 PROCEDURE — C9290 INJ, BUPIVACAINE LIPOSOME: HCPCS | Performed by: ANESTHESIOLOGY

## 2021-03-12 PROCEDURE — 94799 UNLISTED PULMONARY SVC/PX: CPT

## 2021-03-12 PROCEDURE — 58150 PR TOTAL ABDOM HYSTERECTOMY: ICD-10-PCS | Mod: ,,, | Performed by: OBSTETRICS & GYNECOLOGY

## 2021-03-12 PROCEDURE — 37000009 HC ANESTHESIA EA ADD 15 MINS: Performed by: OBSTETRICS & GYNECOLOGY

## 2021-03-12 PROCEDURE — 36000708 HC OR TIME LEV III 1ST 15 MIN: Performed by: OBSTETRICS & GYNECOLOGY

## 2021-03-12 PROCEDURE — 27201423 OPTIME MED/SURG SUP & DEVICES STERILE SUPPLY: Performed by: OBSTETRICS & GYNECOLOGY

## 2021-03-12 PROCEDURE — 36000709 HC OR TIME LEV III EA ADD 15 MIN: Performed by: OBSTETRICS & GYNECOLOGY

## 2021-03-12 PROCEDURE — 63600175 PHARM REV CODE 636 W HCPCS: Performed by: ANESTHESIOLOGY

## 2021-03-12 PROCEDURE — 25000003 PHARM REV CODE 250: Performed by: ANESTHESIOLOGY

## 2021-03-12 PROCEDURE — 25000003 PHARM REV CODE 250: Performed by: NURSE ANESTHETIST, CERTIFIED REGISTERED

## 2021-03-12 PROCEDURE — 63600175 PHARM REV CODE 636 W HCPCS: Mod: JG | Performed by: NURSE ANESTHETIST, CERTIFIED REGISTERED

## 2021-03-12 PROCEDURE — 71000033 HC RECOVERY, INTIAL HOUR: Performed by: OBSTETRICS & GYNECOLOGY

## 2021-03-12 PROCEDURE — 88307 PR  SURG PATH,LEVEL V: ICD-10-PCS | Mod: 26,,, | Performed by: PATHOLOGY

## 2021-03-12 PROCEDURE — 58150 TOTAL HYSTERECTOMY: CPT | Mod: ,,, | Performed by: OBSTETRICS & GYNECOLOGY

## 2021-03-12 PROCEDURE — 63600175 PHARM REV CODE 636 W HCPCS: Performed by: OBSTETRICS & GYNECOLOGY

## 2021-03-12 PROCEDURE — 64488 TAP BLOCK BI INJECTION: CPT | Performed by: ANESTHESIOLOGY

## 2021-03-12 PROCEDURE — P9045 ALBUMIN (HUMAN), 5%, 250 ML: HCPCS | Mod: JG | Performed by: NURSE ANESTHETIST, CERTIFIED REGISTERED

## 2021-03-12 PROCEDURE — 71000039 HC RECOVERY, EACH ADD'L HOUR: Performed by: OBSTETRICS & GYNECOLOGY

## 2021-03-12 PROCEDURE — 25000003 PHARM REV CODE 250: Performed by: STUDENT IN AN ORGANIZED HEALTH CARE EDUCATION/TRAINING PROGRAM

## 2021-03-12 PROCEDURE — 11000001 HC ACUTE MED/SURG PRIVATE ROOM

## 2021-03-12 PROCEDURE — 88307 TISSUE EXAM BY PATHOLOGIST: CPT | Performed by: PATHOLOGY

## 2021-03-12 PROCEDURE — 88307 TISSUE EXAM BY PATHOLOGIST: CPT | Mod: 26,,, | Performed by: PATHOLOGY

## 2021-03-12 PROCEDURE — 82962 GLUCOSE BLOOD TEST: CPT | Performed by: OBSTETRICS & GYNECOLOGY

## 2021-03-12 PROCEDURE — 25000003 PHARM REV CODE 250: Performed by: OBSTETRICS & GYNECOLOGY

## 2021-03-12 PROCEDURE — 63600175 PHARM REV CODE 636 W HCPCS: Performed by: NURSE ANESTHETIST, CERTIFIED REGISTERED

## 2021-03-12 PROCEDURE — 37000008 HC ANESTHESIA 1ST 15 MINUTES: Performed by: OBSTETRICS & GYNECOLOGY

## 2021-03-12 RX ORDER — NEOSTIGMINE METHYLSULFATE 1 MG/ML
INJECTION, SOLUTION INTRAVENOUS
Status: DISCONTINUED | OUTPATIENT
Start: 2021-03-12 | End: 2021-03-12

## 2021-03-12 RX ORDER — FAMOTIDINE 20 MG/1
20 TABLET, FILM COATED ORAL
Status: COMPLETED | OUTPATIENT
Start: 2021-03-12 | End: 2021-03-12

## 2021-03-12 RX ORDER — HYDRALAZINE HYDROCHLORIDE 20 MG/ML
5 INJECTION INTRAMUSCULAR; INTRAVENOUS EVERY 6 HOURS PRN
Status: DISCONTINUED | OUTPATIENT
Start: 2021-03-12 | End: 2021-03-14 | Stop reason: HOSPADM

## 2021-03-12 RX ORDER — FENTANYL CITRATE 50 UG/ML
100 INJECTION, SOLUTION INTRAMUSCULAR; INTRAVENOUS EVERY 5 MIN PRN
Status: DISCONTINUED | OUTPATIENT
Start: 2021-03-12 | End: 2021-03-12 | Stop reason: HOSPADM

## 2021-03-12 RX ORDER — SODIUM CHLORIDE 0.9 % (FLUSH) 0.9 %
3 SYRINGE (ML) INJECTION
Status: DISCONTINUED | OUTPATIENT
Start: 2021-03-12 | End: 2021-03-12

## 2021-03-12 RX ORDER — OXYCODONE AND ACETAMINOPHEN 5; 325 MG/1; MG/1
1 TABLET ORAL EVERY 4 HOURS PRN
Status: DISCONTINUED | OUTPATIENT
Start: 2021-03-12 | End: 2021-03-14 | Stop reason: HOSPADM

## 2021-03-12 RX ORDER — PROCHLORPERAZINE EDISYLATE 5 MG/ML
5 INJECTION INTRAMUSCULAR; INTRAVENOUS EVERY 6 HOURS PRN
Status: DISCONTINUED | OUTPATIENT
Start: 2021-03-12 | End: 2021-03-14 | Stop reason: HOSPADM

## 2021-03-12 RX ORDER — SODIUM CHLORIDE, SODIUM LACTATE, POTASSIUM CHLORIDE, CALCIUM CHLORIDE 600; 310; 30; 20 MG/100ML; MG/100ML; MG/100ML; MG/100ML
INJECTION, SOLUTION INTRAVENOUS CONTINUOUS
Status: DISCONTINUED | OUTPATIENT
Start: 2021-03-12 | End: 2021-03-14

## 2021-03-12 RX ORDER — ONDANSETRON 2 MG/ML
4 INJECTION INTRAMUSCULAR; INTRAVENOUS DAILY PRN
Status: DISCONTINUED | OUTPATIENT
Start: 2021-03-12 | End: 2021-03-12 | Stop reason: HOSPADM

## 2021-03-12 RX ORDER — PHENYLEPHRINE HYDROCHLORIDE 10 MG/ML
INJECTION INTRAVENOUS
Status: DISCONTINUED | OUTPATIENT
Start: 2021-03-12 | End: 2021-03-12

## 2021-03-12 RX ORDER — BUPIVACAINE HYDROCHLORIDE 2.5 MG/ML
INJECTION, SOLUTION EPIDURAL; INFILTRATION; INTRACAUDAL
Status: COMPLETED | OUTPATIENT
Start: 2021-03-12 | End: 2021-03-12

## 2021-03-12 RX ORDER — SODIUM CHLORIDE 9 MG/ML
INJECTION, SOLUTION INTRAVENOUS CONTINUOUS
Status: DISCONTINUED | OUTPATIENT
Start: 2021-03-12 | End: 2021-03-12

## 2021-03-12 RX ORDER — MUPIROCIN 20 MG/G
OINTMENT TOPICAL
Status: DISCONTINUED | OUTPATIENT
Start: 2021-03-12 | End: 2021-03-12 | Stop reason: HOSPADM

## 2021-03-12 RX ORDER — HYDROMORPHONE HYDROCHLORIDE 2 MG/ML
INJECTION, SOLUTION INTRAMUSCULAR; INTRAVENOUS; SUBCUTANEOUS
Status: DISCONTINUED | OUTPATIENT
Start: 2021-03-12 | End: 2021-03-12

## 2021-03-12 RX ORDER — HYDROMORPHONE HYDROCHLORIDE 2 MG/ML
0.4 INJECTION, SOLUTION INTRAMUSCULAR; INTRAVENOUS; SUBCUTANEOUS EVERY 5 MIN PRN
Status: DISCONTINUED | OUTPATIENT
Start: 2021-03-12 | End: 2021-03-12 | Stop reason: HOSPADM

## 2021-03-12 RX ORDER — MUPIROCIN 20 MG/G
OINTMENT TOPICAL 2 TIMES DAILY
Status: DISCONTINUED | OUTPATIENT
Start: 2021-03-12 | End: 2021-03-14 | Stop reason: HOSPADM

## 2021-03-12 RX ORDER — ATORVASTATIN CALCIUM 20 MG/1
20 TABLET, FILM COATED ORAL DAILY
Status: DISCONTINUED | OUTPATIENT
Start: 2021-03-13 | End: 2021-03-14 | Stop reason: HOSPADM

## 2021-03-12 RX ORDER — LIDOCAINE HYDROCHLORIDE 20 MG/ML
INJECTION INTRAVENOUS
Status: DISCONTINUED | OUTPATIENT
Start: 2021-03-12 | End: 2021-03-12

## 2021-03-12 RX ORDER — ONDANSETRON 8 MG/1
8 TABLET, ORALLY DISINTEGRATING ORAL EVERY 8 HOURS PRN
Status: DISCONTINUED | OUTPATIENT
Start: 2021-03-12 | End: 2021-03-14 | Stop reason: HOSPADM

## 2021-03-12 RX ORDER — IBUPROFEN 600 MG/1
600 TABLET ORAL EVERY 6 HOURS
Status: DISCONTINUED | OUTPATIENT
Start: 2021-03-13 | End: 2021-03-14 | Stop reason: HOSPADM

## 2021-03-12 RX ORDER — KETAMINE HCL IN 0.9 % NACL 50 MG/5 ML
SYRINGE (ML) INTRAVENOUS
Status: DISCONTINUED | OUTPATIENT
Start: 2021-03-12 | End: 2021-03-12

## 2021-03-12 RX ORDER — ACETAMINOPHEN 500 MG
1000 TABLET ORAL
Status: COMPLETED | OUTPATIENT
Start: 2021-03-12 | End: 2021-03-12

## 2021-03-12 RX ORDER — KETOROLAC TROMETHAMINE 30 MG/ML
INJECTION, SOLUTION INTRAMUSCULAR; INTRAVENOUS
Status: DISCONTINUED | OUTPATIENT
Start: 2021-03-12 | End: 2021-03-12

## 2021-03-12 RX ORDER — PROPOFOL 10 MG/ML
VIAL (ML) INTRAVENOUS
Status: DISCONTINUED | OUTPATIENT
Start: 2021-03-12 | End: 2021-03-12

## 2021-03-12 RX ORDER — BISACODYL 10 MG
10 SUPPOSITORY, RECTAL RECTAL DAILY PRN
Status: DISCONTINUED | OUTPATIENT
Start: 2021-03-12 | End: 2021-03-14 | Stop reason: HOSPADM

## 2021-03-12 RX ORDER — SODIUM CHLORIDE, SODIUM LACTATE, POTASSIUM CHLORIDE, CALCIUM CHLORIDE 600; 310; 30; 20 MG/100ML; MG/100ML; MG/100ML; MG/100ML
INJECTION, SOLUTION INTRAVENOUS CONTINUOUS
Status: DISCONTINUED | OUTPATIENT
Start: 2021-03-12 | End: 2021-03-12

## 2021-03-12 RX ORDER — DIPHENHYDRAMINE HCL 25 MG
25 CAPSULE ORAL EVERY 4 HOURS PRN
Status: DISCONTINUED | OUTPATIENT
Start: 2021-03-12 | End: 2021-03-14 | Stop reason: HOSPADM

## 2021-03-12 RX ORDER — DIPHENHYDRAMINE HYDROCHLORIDE 50 MG/ML
25 INJECTION INTRAMUSCULAR; INTRAVENOUS EVERY 6 HOURS PRN
Status: DISCONTINUED | OUTPATIENT
Start: 2021-03-12 | End: 2021-03-12 | Stop reason: HOSPADM

## 2021-03-12 RX ORDER — ALBUMIN HUMAN 50 G/1000ML
SOLUTION INTRAVENOUS CONTINUOUS PRN
Status: DISCONTINUED | OUTPATIENT
Start: 2021-03-12 | End: 2021-03-12

## 2021-03-12 RX ORDER — OXYCODONE AND ACETAMINOPHEN 10; 325 MG/1; MG/1
1 TABLET ORAL EVERY 4 HOURS PRN
Status: DISCONTINUED | OUTPATIENT
Start: 2021-03-12 | End: 2021-03-14 | Stop reason: HOSPADM

## 2021-03-12 RX ORDER — MIDAZOLAM HYDROCHLORIDE 1 MG/ML
5 INJECTION INTRAMUSCULAR; INTRAVENOUS ONCE AS NEEDED
Status: COMPLETED | OUTPATIENT
Start: 2021-03-12 | End: 2021-03-12

## 2021-03-12 RX ORDER — MEPERIDINE HYDROCHLORIDE 25 MG/ML
12.5 INJECTION INTRAMUSCULAR; INTRAVENOUS; SUBCUTANEOUS ONCE AS NEEDED
Status: DISCONTINUED | OUTPATIENT
Start: 2021-03-12 | End: 2021-03-12 | Stop reason: HOSPADM

## 2021-03-12 RX ORDER — FENTANYL CITRATE 50 UG/ML
INJECTION, SOLUTION INTRAMUSCULAR; INTRAVENOUS
Status: DISCONTINUED | OUTPATIENT
Start: 2021-03-12 | End: 2021-03-12

## 2021-03-12 RX ORDER — KETOROLAC TROMETHAMINE 30 MG/ML
30 INJECTION, SOLUTION INTRAMUSCULAR; INTRAVENOUS EVERY 8 HOURS
Status: COMPLETED | OUTPATIENT
Start: 2021-03-12 | End: 2021-03-13

## 2021-03-12 RX ORDER — OXYCODONE HYDROCHLORIDE 5 MG/1
5 TABLET ORAL
Status: DISCONTINUED | OUTPATIENT
Start: 2021-03-12 | End: 2021-03-12 | Stop reason: HOSPADM

## 2021-03-12 RX ORDER — DEXAMETHASONE SODIUM PHOSPHATE 4 MG/ML
INJECTION, SOLUTION INTRA-ARTICULAR; INTRALESIONAL; INTRAMUSCULAR; INTRAVENOUS; SOFT TISSUE
Status: DISCONTINUED | OUTPATIENT
Start: 2021-03-12 | End: 2021-03-12

## 2021-03-12 RX ORDER — PREGABALIN 50 MG/1
50 CAPSULE ORAL ONCE
Status: COMPLETED | OUTPATIENT
Start: 2021-03-12 | End: 2021-03-12

## 2021-03-12 RX ORDER — LIDOCAINE HYDROCHLORIDE 10 MG/ML
0.5 INJECTION, SOLUTION EPIDURAL; INFILTRATION; INTRACAUDAL; PERINEURAL ONCE
Status: DISCONTINUED | OUTPATIENT
Start: 2021-03-12 | End: 2021-03-12 | Stop reason: HOSPADM

## 2021-03-12 RX ORDER — CEFAZOLIN SODIUM 1 G/3ML
2 INJECTION, POWDER, FOR SOLUTION INTRAMUSCULAR; INTRAVENOUS
Status: DISCONTINUED | OUTPATIENT
Start: 2021-03-12 | End: 2021-03-12 | Stop reason: HOSPADM

## 2021-03-12 RX ORDER — ROCURONIUM BROMIDE 10 MG/ML
INJECTION, SOLUTION INTRAVENOUS
Status: DISCONTINUED | OUTPATIENT
Start: 2021-03-12 | End: 2021-03-12

## 2021-03-12 RX ORDER — DIPHENHYDRAMINE HYDROCHLORIDE 50 MG/ML
25 INJECTION INTRAMUSCULAR; INTRAVENOUS EVERY 4 HOURS PRN
Status: DISCONTINUED | OUTPATIENT
Start: 2021-03-12 | End: 2021-03-14 | Stop reason: HOSPADM

## 2021-03-12 RX ORDER — ONDANSETRON 2 MG/ML
INJECTION INTRAMUSCULAR; INTRAVENOUS
Status: DISCONTINUED | OUTPATIENT
Start: 2021-03-12 | End: 2021-03-12

## 2021-03-12 RX ADMIN — BUPIVACAINE 20 ML: 13.3 INJECTION, SUSPENSION, LIPOSOMAL INFILTRATION at 07:03

## 2021-03-12 RX ADMIN — Medication 30 MG: at 09:03

## 2021-03-12 RX ADMIN — CARBOXYMETHYLCELLULOSE SODIUM 2 DROP: 2.5 SOLUTION/ DROPS OPHTHALMIC at 07:03

## 2021-03-12 RX ADMIN — PROPOFOL 200 MG: 10 INJECTION, EMULSION INTRAVENOUS at 07:03

## 2021-03-12 RX ADMIN — MUPIROCIN: 20 OINTMENT TOPICAL at 09:03

## 2021-03-12 RX ADMIN — ROCURONIUM BROMIDE 20 MG: 10 SOLUTION INTRAVENOUS at 08:03

## 2021-03-12 RX ADMIN — PREGABALIN 50 MG: 50 CAPSULE ORAL at 06:03

## 2021-03-12 RX ADMIN — CEFAZOLIN 2 G: 330 INJECTION, POWDER, FOR SOLUTION INTRAMUSCULAR; INTRAVENOUS at 12:03

## 2021-03-12 RX ADMIN — SODIUM CHLORIDE, SODIUM LACTATE, POTASSIUM CHLORIDE, AND CALCIUM CHLORIDE: .6; .31; .03; .02 INJECTION, SOLUTION INTRAVENOUS at 03:03

## 2021-03-12 RX ADMIN — ACETAMINOPHEN 1000 MG: 500 TABLET, FILM COATED ORAL at 06:03

## 2021-03-12 RX ADMIN — HYDROMORPHONE HYDROCHLORIDE 0.4 MG: 2 INJECTION, SOLUTION INTRAMUSCULAR; INTRAVENOUS; SUBCUTANEOUS at 11:03

## 2021-03-12 RX ADMIN — PROMETHAZINE HYDROCHLORIDE 6.25 MG: 25 INJECTION INTRAMUSCULAR; INTRAVENOUS at 02:03

## 2021-03-12 RX ADMIN — LIDOCAINE HYDROCHLORIDE 50 MG: 20 INJECTION, SOLUTION INTRAVENOUS at 07:03

## 2021-03-12 RX ADMIN — KETOROLAC TROMETHAMINE 30 MG: 30 INJECTION, SOLUTION INTRAMUSCULAR; INTRAVENOUS at 09:03

## 2021-03-12 RX ADMIN — ALBUMIN (HUMAN): 12.5 SOLUTION INTRAVENOUS at 08:03

## 2021-03-12 RX ADMIN — NEOSTIGMINE METHYLSULFATE 5 MG: 1 INJECTION INTRAVENOUS at 12:03

## 2021-03-12 RX ADMIN — CEFAZOLIN 2 G: 330 INJECTION, POWDER, FOR SOLUTION INTRAMUSCULAR; INTRAVENOUS at 08:03

## 2021-03-12 RX ADMIN — FENTANYL CITRATE 100 MCG: 50 INJECTION, SOLUTION INTRAMUSCULAR; INTRAVENOUS at 08:03

## 2021-03-12 RX ADMIN — ROCURONIUM BROMIDE 50 MG: 10 SOLUTION INTRAVENOUS at 07:03

## 2021-03-12 RX ADMIN — DEXAMETHASONE SODIUM PHOSPHATE 8 MG: 4 INJECTION, SOLUTION INTRAMUSCULAR; INTRAVENOUS at 12:03

## 2021-03-12 RX ADMIN — ONDANSETRON HYDROCHLORIDE 4 MG: 2 INJECTION INTRAMUSCULAR; INTRAVENOUS at 12:03

## 2021-03-12 RX ADMIN — PHENYLEPHRINE HYDROCHLORIDE 100 MCG: 10 INJECTION INTRAVENOUS at 12:03

## 2021-03-12 RX ADMIN — MUPIROCIN: 20 OINTMENT TOPICAL at 06:03

## 2021-03-12 RX ADMIN — GLYCOPYRROLATE 0.2 MCG: 0.2 INJECTION, SOLUTION INTRAMUSCULAR; INTRAVITREAL at 09:03

## 2021-03-12 RX ADMIN — MIDAZOLAM HYDROCHLORIDE 1 MG: 1 INJECTION, SOLUTION INTRAMUSCULAR; INTRAVENOUS at 06:03

## 2021-03-12 RX ADMIN — KETOROLAC TROMETHAMINE 30 MG: 30 INJECTION, SOLUTION INTRAMUSCULAR; INTRAVENOUS at 12:03

## 2021-03-12 RX ADMIN — ROCURONIUM BROMIDE 10 MG: 10 SOLUTION INTRAVENOUS at 10:03

## 2021-03-12 RX ADMIN — OXYCODONE AND ACETAMINOPHEN 1 TABLET: 10; 325 TABLET ORAL at 07:03

## 2021-03-12 RX ADMIN — INDIGO CARMINE 5 ML: 8 INJECTION, SOLUTION INTRAMUSCULAR; INTRAVENOUS at 12:03

## 2021-03-12 RX ADMIN — MIDAZOLAM HYDROCHLORIDE 2 MG: 1 INJECTION, SOLUTION INTRAMUSCULAR; INTRAVENOUS at 06:03

## 2021-03-12 RX ADMIN — FENTANYL CITRATE 100 MCG: 50 INJECTION, SOLUTION INTRAMUSCULAR; INTRAVENOUS at 07:03

## 2021-03-12 RX ADMIN — FENTANYL CITRATE 50 MCG: 50 INJECTION, SOLUTION INTRAMUSCULAR; INTRAVENOUS at 10:03

## 2021-03-12 RX ADMIN — ALBUMIN (HUMAN): 12.5 SOLUTION INTRAVENOUS at 10:03

## 2021-03-12 RX ADMIN — ONDANSETRON 4 MG: 2 INJECTION INTRAMUSCULAR; INTRAVENOUS at 01:03

## 2021-03-12 RX ADMIN — OXYCODONE AND ACETAMINOPHEN 1 TABLET: 10; 325 TABLET ORAL at 11:03

## 2021-03-12 RX ADMIN — BUPIVACAINE HYDROCHLORIDE 40 ML: 2.5 INJECTION, SOLUTION EPIDURAL; INFILTRATION; INTRACAUDAL; PERINEURAL at 07:03

## 2021-03-12 RX ADMIN — FAMOTIDINE 20 MG: 20 TABLET, FILM COATED ORAL at 06:03

## 2021-03-12 RX ADMIN — GLYCOPYRROLATE 0.6 MG: 0.2 INJECTION, SOLUTION INTRAMUSCULAR; INTRAVITREAL at 12:03

## 2021-03-12 RX ADMIN — ROCURONIUM BROMIDE 20 MG: 10 SOLUTION INTRAVENOUS at 09:03

## 2021-03-13 LAB
BASOPHILS # BLD AUTO: 0.01 K/UL (ref 0–0.2)
BASOPHILS NFR BLD: 0.1 % (ref 0–1.9)
DIFFERENTIAL METHOD: ABNORMAL
EOSINOPHIL # BLD AUTO: 0 K/UL (ref 0–0.5)
EOSINOPHIL NFR BLD: 0 % (ref 0–8)
ERYTHROCYTE [DISTWIDTH] IN BLOOD BY AUTOMATED COUNT: 12.2 % (ref 11.5–14.5)
HCT VFR BLD AUTO: 22.4 % (ref 37–48.5)
HGB BLD-MCNC: 7.6 G/DL (ref 12–16)
IMM GRANULOCYTES # BLD AUTO: 0.02 K/UL (ref 0–0.04)
IMM GRANULOCYTES NFR BLD AUTO: 0.2 % (ref 0–0.5)
LYMPHOCYTES # BLD AUTO: 1.3 K/UL (ref 1–4.8)
LYMPHOCYTES NFR BLD: 16.1 % (ref 18–48)
MCH RBC QN AUTO: 31.7 PG (ref 27–31)
MCHC RBC AUTO-ENTMCNC: 33.9 G/DL (ref 32–36)
MCV RBC AUTO: 93 FL (ref 82–98)
MONOCYTES # BLD AUTO: 0.7 K/UL (ref 0.3–1)
MONOCYTES NFR BLD: 8.6 % (ref 4–15)
NEUTROPHILS # BLD AUTO: 6.2 K/UL (ref 1.8–7.7)
NEUTROPHILS NFR BLD: 75 % (ref 38–73)
NRBC BLD-RTO: 0 /100 WBC
PLATELET # BLD AUTO: 201 K/UL (ref 150–350)
PMV BLD AUTO: 11.1 FL (ref 9.2–12.9)
RBC # BLD AUTO: 2.4 M/UL (ref 4–5.4)
WBC # BLD AUTO: 8.21 K/UL (ref 3.9–12.7)

## 2021-03-13 PROCEDURE — 25000003 PHARM REV CODE 250: Performed by: STUDENT IN AN ORGANIZED HEALTH CARE EDUCATION/TRAINING PROGRAM

## 2021-03-13 PROCEDURE — 36415 COLL VENOUS BLD VENIPUNCTURE: CPT | Performed by: STUDENT IN AN ORGANIZED HEALTH CARE EDUCATION/TRAINING PROGRAM

## 2021-03-13 PROCEDURE — 94761 N-INVAS EAR/PLS OXIMETRY MLT: CPT

## 2021-03-13 PROCEDURE — 63600175 PHARM REV CODE 636 W HCPCS: Performed by: STUDENT IN AN ORGANIZED HEALTH CARE EDUCATION/TRAINING PROGRAM

## 2021-03-13 PROCEDURE — 85025 COMPLETE CBC W/AUTO DIFF WBC: CPT | Performed by: STUDENT IN AN ORGANIZED HEALTH CARE EDUCATION/TRAINING PROGRAM

## 2021-03-13 PROCEDURE — 11000001 HC ACUTE MED/SURG PRIVATE ROOM

## 2021-03-13 PROCEDURE — 94799 UNLISTED PULMONARY SVC/PX: CPT

## 2021-03-13 RX ADMIN — ATORVASTATIN CALCIUM 20 MG: 20 TABLET, FILM COATED ORAL at 08:03

## 2021-03-13 RX ADMIN — MUPIROCIN: 20 OINTMENT TOPICAL at 08:03

## 2021-03-13 RX ADMIN — OXYCODONE AND ACETAMINOPHEN 1 TABLET: 10; 325 TABLET ORAL at 03:03

## 2021-03-13 RX ADMIN — OXYCODONE AND ACETAMINOPHEN 1 TABLET: 10; 325 TABLET ORAL at 08:03

## 2021-03-13 RX ADMIN — ONDANSETRON 8 MG: 8 TABLET, ORALLY DISINTEGRATING ORAL at 09:03

## 2021-03-13 RX ADMIN — KETOROLAC TROMETHAMINE 30 MG: 30 INJECTION, SOLUTION INTRAMUSCULAR; INTRAVENOUS at 02:03

## 2021-03-13 RX ADMIN — IBUPROFEN 600 MG: 600 TABLET ORAL at 08:03

## 2021-03-13 RX ADMIN — SODIUM CHLORIDE, SODIUM LACTATE, POTASSIUM CHLORIDE, AND CALCIUM CHLORIDE: .6; .31; .03; .02 INJECTION, SOLUTION INTRAVENOUS at 04:03

## 2021-03-13 RX ADMIN — KETOROLAC TROMETHAMINE 30 MG: 30 INJECTION, SOLUTION INTRAMUSCULAR; INTRAVENOUS at 06:03

## 2021-03-14 VITALS
RESPIRATION RATE: 18 BRPM | WEIGHT: 177.25 LBS | BODY MASS INDEX: 30.26 KG/M2 | HEART RATE: 78 BPM | DIASTOLIC BLOOD PRESSURE: 63 MMHG | HEIGHT: 64 IN | SYSTOLIC BLOOD PRESSURE: 133 MMHG | TEMPERATURE: 98 F | OXYGEN SATURATION: 100 %

## 2021-03-14 PROCEDURE — 99024 POSTOP FOLLOW-UP VISIT: CPT | Mod: ,,, | Performed by: OBSTETRICS & GYNECOLOGY

## 2021-03-14 PROCEDURE — 94761 N-INVAS EAR/PLS OXIMETRY MLT: CPT

## 2021-03-14 PROCEDURE — 25000003 PHARM REV CODE 250: Performed by: STUDENT IN AN ORGANIZED HEALTH CARE EDUCATION/TRAINING PROGRAM

## 2021-03-14 PROCEDURE — 99024 PR POST-OP FOLLOW-UP VISIT: ICD-10-PCS | Mod: ,,, | Performed by: OBSTETRICS & GYNECOLOGY

## 2021-03-14 PROCEDURE — 94799 UNLISTED PULMONARY SVC/PX: CPT

## 2021-03-14 RX ORDER — OXYCODONE AND ACETAMINOPHEN 5; 325 MG/1; MG/1
1 TABLET ORAL EVERY 4 HOURS PRN
Qty: 20 EACH | Refills: 0 | Status: SHIPPED | OUTPATIENT
Start: 2021-03-14 | End: 2021-04-22

## 2021-03-14 RX ORDER — IBUPROFEN 600 MG/1
600 TABLET ORAL EVERY 6 HOURS
Qty: 60 TABLET | Refills: 1 | Status: SHIPPED | OUTPATIENT
Start: 2021-03-14 | End: 2021-04-22

## 2021-03-14 RX ORDER — DOCUSATE SODIUM 100 MG/1
100 CAPSULE, LIQUID FILLED ORAL 2 TIMES DAILY
Qty: 60 CAPSULE | Refills: 1 | Status: SHIPPED | OUTPATIENT
Start: 2021-03-14 | End: 2021-04-22

## 2021-03-14 RX ADMIN — IBUPROFEN 600 MG: 600 TABLET ORAL at 12:03

## 2021-03-14 RX ADMIN — MUPIROCIN: 20 OINTMENT TOPICAL at 11:03

## 2021-03-14 RX ADMIN — ATORVASTATIN CALCIUM 20 MG: 20 TABLET, FILM COATED ORAL at 08:03

## 2021-03-14 RX ADMIN — IBUPROFEN 600 MG: 600 TABLET ORAL at 05:03

## 2021-03-17 LAB
FINAL PATHOLOGIC DIAGNOSIS: NORMAL
GROSS: NORMAL
Lab: NORMAL

## 2021-03-23 ENCOUNTER — OFFICE VISIT (OUTPATIENT)
Dept: OBSTETRICS AND GYNECOLOGY | Facility: CLINIC | Age: 41
End: 2021-03-23
Payer: COMMERCIAL

## 2021-03-23 ENCOUNTER — PATIENT MESSAGE (OUTPATIENT)
Dept: OBSTETRICS AND GYNECOLOGY | Facility: CLINIC | Age: 41
End: 2021-03-23

## 2021-03-23 VITALS
WEIGHT: 174.19 LBS | DIASTOLIC BLOOD PRESSURE: 82 MMHG | BODY MASS INDEX: 29.74 KG/M2 | SYSTOLIC BLOOD PRESSURE: 152 MMHG | HEIGHT: 64 IN

## 2021-03-23 DIAGNOSIS — Z90.710 S/P TAH (TOTAL ABDOMINAL HYSTERECTOMY): Primary | ICD-10-CM

## 2021-03-23 DIAGNOSIS — I10 ESSENTIAL HYPERTENSION: ICD-10-CM

## 2021-03-23 PROCEDURE — 1126F PR PAIN SEVERITY QUANTIFIED, NO PAIN PRESENT: ICD-10-PCS | Mod: S$GLB,,, | Performed by: OBSTETRICS & GYNECOLOGY

## 2021-03-23 PROCEDURE — 99999 PR PBB SHADOW E&M-EST. PATIENT-LVL III: CPT | Mod: PBBFAC,,, | Performed by: OBSTETRICS & GYNECOLOGY

## 2021-03-23 PROCEDURE — 1126F AMNT PAIN NOTED NONE PRSNT: CPT | Mod: S$GLB,,, | Performed by: OBSTETRICS & GYNECOLOGY

## 2021-03-23 PROCEDURE — 99024 PR POST-OP FOLLOW-UP VISIT: ICD-10-PCS | Mod: S$GLB,,, | Performed by: OBSTETRICS & GYNECOLOGY

## 2021-03-23 PROCEDURE — 3008F PR BODY MASS INDEX (BMI) DOCUMENTED: ICD-10-PCS | Mod: CPTII,S$GLB,, | Performed by: OBSTETRICS & GYNECOLOGY

## 2021-03-23 PROCEDURE — 3008F BODY MASS INDEX DOCD: CPT | Mod: CPTII,S$GLB,, | Performed by: OBSTETRICS & GYNECOLOGY

## 2021-03-23 PROCEDURE — 99999 PR PBB SHADOW E&M-EST. PATIENT-LVL III: ICD-10-PCS | Mod: PBBFAC,,, | Performed by: OBSTETRICS & GYNECOLOGY

## 2021-03-23 PROCEDURE — 99024 POSTOP FOLLOW-UP VISIT: CPT | Mod: S$GLB,,, | Performed by: OBSTETRICS & GYNECOLOGY

## 2021-03-30 ENCOUNTER — PATIENT MESSAGE (OUTPATIENT)
Dept: OBSTETRICS AND GYNECOLOGY | Facility: CLINIC | Age: 41
End: 2021-03-30

## 2021-03-30 ENCOUNTER — TELEPHONE (OUTPATIENT)
Dept: OBSTETRICS AND GYNECOLOGY | Facility: CLINIC | Age: 41
End: 2021-03-30

## 2021-03-31 ENCOUNTER — PATIENT MESSAGE (OUTPATIENT)
Dept: OBSTETRICS AND GYNECOLOGY | Facility: CLINIC | Age: 41
End: 2021-03-31

## 2021-04-01 ENCOUNTER — OFFICE VISIT (OUTPATIENT)
Dept: OBSTETRICS AND GYNECOLOGY | Facility: CLINIC | Age: 41
End: 2021-04-01
Payer: COMMERCIAL

## 2021-04-01 VITALS
DIASTOLIC BLOOD PRESSURE: 81 MMHG | HEIGHT: 64 IN | WEIGHT: 176.38 LBS | BODY MASS INDEX: 30.11 KG/M2 | SYSTOLIC BLOOD PRESSURE: 152 MMHG

## 2021-04-01 DIAGNOSIS — N93.9 VAGINAL BLEEDING: ICD-10-CM

## 2021-04-01 DIAGNOSIS — Z09 POSTOP CHECK: Primary | ICD-10-CM

## 2021-04-01 PROCEDURE — 3008F BODY MASS INDEX DOCD: CPT | Mod: CPTII,S$GLB,, | Performed by: PHYSICIAN ASSISTANT

## 2021-04-01 PROCEDURE — 99999 PR PBB SHADOW E&M-EST. PATIENT-LVL III: CPT | Mod: PBBFAC,,, | Performed by: PHYSICIAN ASSISTANT

## 2021-04-01 PROCEDURE — 99999 PR PBB SHADOW E&M-EST. PATIENT-LVL III: ICD-10-PCS | Mod: PBBFAC,,, | Performed by: PHYSICIAN ASSISTANT

## 2021-04-01 PROCEDURE — 1125F AMNT PAIN NOTED PAIN PRSNT: CPT | Mod: S$GLB,,, | Performed by: PHYSICIAN ASSISTANT

## 2021-04-01 PROCEDURE — 99024 PR POST-OP FOLLOW-UP VISIT: ICD-10-PCS | Mod: S$GLB,,, | Performed by: PHYSICIAN ASSISTANT

## 2021-04-01 PROCEDURE — 1125F PR PAIN SEVERITY QUANTIFIED, PAIN PRESENT: ICD-10-PCS | Mod: S$GLB,,, | Performed by: PHYSICIAN ASSISTANT

## 2021-04-01 PROCEDURE — 3008F PR BODY MASS INDEX (BMI) DOCUMENTED: ICD-10-PCS | Mod: CPTII,S$GLB,, | Performed by: PHYSICIAN ASSISTANT

## 2021-04-01 PROCEDURE — 99024 POSTOP FOLLOW-UP VISIT: CPT | Mod: S$GLB,,, | Performed by: PHYSICIAN ASSISTANT

## 2021-04-16 ENCOUNTER — PATIENT MESSAGE (OUTPATIENT)
Dept: RESEARCH | Facility: HOSPITAL | Age: 41
End: 2021-04-16

## 2021-04-22 ENCOUNTER — PATIENT MESSAGE (OUTPATIENT)
Dept: UROLOGY | Facility: CLINIC | Age: 41
End: 2021-04-22

## 2021-04-22 ENCOUNTER — OFFICE VISIT (OUTPATIENT)
Dept: OBSTETRICS AND GYNECOLOGY | Facility: CLINIC | Age: 41
End: 2021-04-22
Payer: COMMERCIAL

## 2021-04-22 VITALS
SYSTOLIC BLOOD PRESSURE: 120 MMHG | WEIGHT: 176.38 LBS | BODY MASS INDEX: 30.11 KG/M2 | DIASTOLIC BLOOD PRESSURE: 80 MMHG | HEIGHT: 64 IN

## 2021-04-22 DIAGNOSIS — B96.89 BV (BACTERIAL VAGINOSIS): Primary | ICD-10-CM

## 2021-04-22 DIAGNOSIS — Z90.710 S/P TAH (TOTAL ABDOMINAL HYSTERECTOMY): ICD-10-CM

## 2021-04-22 DIAGNOSIS — N76.0 BV (BACTERIAL VAGINOSIS): Primary | ICD-10-CM

## 2021-04-22 PROCEDURE — 99999 PR PBB SHADOW E&M-EST. PATIENT-LVL III: CPT | Mod: PBBFAC,,, | Performed by: OBSTETRICS & GYNECOLOGY

## 2021-04-22 PROCEDURE — 99999 PR PBB SHADOW E&M-EST. PATIENT-LVL III: ICD-10-PCS | Mod: PBBFAC,,, | Performed by: OBSTETRICS & GYNECOLOGY

## 2021-04-22 PROCEDURE — 1126F PR PAIN SEVERITY QUANTIFIED, NO PAIN PRESENT: ICD-10-PCS | Mod: S$GLB,,, | Performed by: OBSTETRICS & GYNECOLOGY

## 2021-04-22 PROCEDURE — 3008F BODY MASS INDEX DOCD: CPT | Mod: CPTII,S$GLB,, | Performed by: OBSTETRICS & GYNECOLOGY

## 2021-04-22 PROCEDURE — 1126F AMNT PAIN NOTED NONE PRSNT: CPT | Mod: S$GLB,,, | Performed by: OBSTETRICS & GYNECOLOGY

## 2021-04-22 PROCEDURE — 3008F PR BODY MASS INDEX (BMI) DOCUMENTED: ICD-10-PCS | Mod: CPTII,S$GLB,, | Performed by: OBSTETRICS & GYNECOLOGY

## 2021-04-22 PROCEDURE — 99024 POSTOP FOLLOW-UP VISIT: CPT | Mod: S$GLB,,, | Performed by: OBSTETRICS & GYNECOLOGY

## 2021-04-22 PROCEDURE — 99024 PR POST-OP FOLLOW-UP VISIT: ICD-10-PCS | Mod: S$GLB,,, | Performed by: OBSTETRICS & GYNECOLOGY

## 2021-04-22 RX ORDER — FLUCONAZOLE 150 MG/1
150 TABLET ORAL ONCE
Qty: 1 TABLET | Refills: 0 | Status: SHIPPED | OUTPATIENT
Start: 2021-04-22 | End: 2021-04-22

## 2021-04-22 RX ORDER — METRONIDAZOLE 500 MG/1
500 TABLET ORAL 2 TIMES DAILY
Qty: 14 TABLET | Refills: 0 | Status: SHIPPED | OUTPATIENT
Start: 2021-04-22 | End: 2021-04-29

## 2021-05-13 ENCOUNTER — OFFICE VISIT (OUTPATIENT)
Dept: OBSTETRICS AND GYNECOLOGY | Facility: CLINIC | Age: 41
End: 2021-05-13
Payer: COMMERCIAL

## 2021-05-13 VITALS
SYSTOLIC BLOOD PRESSURE: 120 MMHG | WEIGHT: 180.75 LBS | DIASTOLIC BLOOD PRESSURE: 78 MMHG | HEIGHT: 64 IN | BODY MASS INDEX: 30.86 KG/M2

## 2021-05-13 DIAGNOSIS — Z90.710 S/P TAH (TOTAL ABDOMINAL HYSTERECTOMY): Primary | ICD-10-CM

## 2021-05-13 PROCEDURE — 3008F BODY MASS INDEX DOCD: CPT | Mod: CPTII,S$GLB,, | Performed by: OBSTETRICS & GYNECOLOGY

## 2021-05-13 PROCEDURE — 99024 PR POST-OP FOLLOW-UP VISIT: ICD-10-PCS | Mod: S$GLB,,, | Performed by: OBSTETRICS & GYNECOLOGY

## 2021-05-13 PROCEDURE — 99024 POSTOP FOLLOW-UP VISIT: CPT | Mod: S$GLB,,, | Performed by: OBSTETRICS & GYNECOLOGY

## 2021-05-13 PROCEDURE — 99999 PR PBB SHADOW E&M-EST. PATIENT-LVL III: CPT | Mod: PBBFAC,,, | Performed by: OBSTETRICS & GYNECOLOGY

## 2021-05-13 PROCEDURE — 99999 PR PBB SHADOW E&M-EST. PATIENT-LVL III: ICD-10-PCS | Mod: PBBFAC,,, | Performed by: OBSTETRICS & GYNECOLOGY

## 2021-05-13 PROCEDURE — 1126F PR PAIN SEVERITY QUANTIFIED, NO PAIN PRESENT: ICD-10-PCS | Mod: S$GLB,,, | Performed by: OBSTETRICS & GYNECOLOGY

## 2021-05-13 PROCEDURE — 1126F AMNT PAIN NOTED NONE PRSNT: CPT | Mod: S$GLB,,, | Performed by: OBSTETRICS & GYNECOLOGY

## 2021-05-13 PROCEDURE — 3008F PR BODY MASS INDEX (BMI) DOCUMENTED: ICD-10-PCS | Mod: CPTII,S$GLB,, | Performed by: OBSTETRICS & GYNECOLOGY

## 2021-05-25 ENCOUNTER — OFFICE VISIT (OUTPATIENT)
Dept: FAMILY MEDICINE | Facility: CLINIC | Age: 41
End: 2021-05-25
Payer: COMMERCIAL

## 2021-05-25 VITALS
HEIGHT: 64 IN | SYSTOLIC BLOOD PRESSURE: 130 MMHG | DIASTOLIC BLOOD PRESSURE: 86 MMHG | HEART RATE: 79 BPM | BODY MASS INDEX: 30.73 KG/M2 | WEIGHT: 180 LBS

## 2021-05-25 DIAGNOSIS — I10 ESSENTIAL HYPERTENSION: Primary | ICD-10-CM

## 2021-05-25 PROCEDURE — 99212 OFFICE O/P EST SF 10 MIN: CPT | Mod: S$GLB,,, | Performed by: FAMILY MEDICINE

## 2021-05-25 PROCEDURE — 99212 PR OFFICE/OUTPT VISIT, EST, LEVL II, 10-19 MIN: ICD-10-PCS | Mod: S$GLB,,, | Performed by: FAMILY MEDICINE

## 2021-05-25 PROCEDURE — 3079F PR MOST RECENT DIASTOLIC BLOOD PRESSURE 80-89 MM HG: ICD-10-PCS | Mod: S$GLB,,, | Performed by: FAMILY MEDICINE

## 2021-05-25 PROCEDURE — 3008F BODY MASS INDEX DOCD: CPT | Mod: S$GLB,,, | Performed by: FAMILY MEDICINE

## 2021-05-25 PROCEDURE — 3008F PR BODY MASS INDEX (BMI) DOCUMENTED: ICD-10-PCS | Mod: S$GLB,,, | Performed by: FAMILY MEDICINE

## 2021-05-25 PROCEDURE — 3079F DIAST BP 80-89 MM HG: CPT | Mod: S$GLB,,, | Performed by: FAMILY MEDICINE

## 2021-05-25 PROCEDURE — 3075F SYST BP GE 130 - 139MM HG: CPT | Mod: S$GLB,,, | Performed by: FAMILY MEDICINE

## 2021-05-25 PROCEDURE — 3075F PR MOST RECENT SYSTOLIC BLOOD PRESS GE 130-139MM HG: ICD-10-PCS | Mod: S$GLB,,, | Performed by: FAMILY MEDICINE

## 2021-05-26 LAB
ALBUMIN SERPL-MCNC: 4.3 G/DL (ref 3.6–5.1)
ALBUMIN/CREAT UR: 3 MCG/MG CREAT
ALBUMIN/GLOB SERPL: 1.4 (CALC) (ref 1–2.5)
ALP SERPL-CCNC: 42 U/L (ref 31–125)
ALT SERPL-CCNC: 15 U/L (ref 6–29)
AST SERPL-CCNC: 15 U/L (ref 10–30)
BASOPHILS # BLD AUTO: 31 CELLS/UL (ref 0–200)
BASOPHILS NFR BLD AUTO: 0.7 %
BILIRUB SERPL-MCNC: 0.7 MG/DL (ref 0.2–1.2)
BUN SERPL-MCNC: 13 MG/DL (ref 7–25)
BUN/CREAT SERPL: NORMAL (CALC) (ref 6–22)
CALCIUM SERPL-MCNC: 9.6 MG/DL (ref 8.6–10.2)
CHLORIDE SERPL-SCNC: 102 MMOL/L (ref 98–110)
CHOLEST SERPL-MCNC: 227 MG/DL
CHOLEST/HDLC SERPL: 3.2 (CALC)
CO2 SERPL-SCNC: 25 MMOL/L (ref 20–32)
CREAT SERPL-MCNC: 0.79 MG/DL (ref 0.5–1.1)
CREAT UR-MCNC: 105 MG/DL (ref 20–275)
EOSINOPHIL # BLD AUTO: 110 CELLS/UL (ref 15–500)
EOSINOPHIL NFR BLD AUTO: 2.5 %
ERYTHROCYTE [DISTWIDTH] IN BLOOD BY AUTOMATED COUNT: 13.9 % (ref 11–15)
GLOBULIN SER CALC-MCNC: 3.1 G/DL (CALC) (ref 1.9–3.7)
GLUCOSE SERPL-MCNC: 85 MG/DL (ref 65–99)
HCT VFR BLD AUTO: 42.3 % (ref 35–45)
HDLC SERPL-MCNC: 70 MG/DL
HGB BLD-MCNC: 13.2 G/DL (ref 11.7–15.5)
LDLC SERPL CALC-MCNC: 143 MG/DL (CALC)
LYMPHOCYTES # BLD AUTO: 1531 CELLS/UL (ref 850–3900)
LYMPHOCYTES NFR BLD AUTO: 34.8 %
MCH RBC QN AUTO: 28.4 PG (ref 27–33)
MCHC RBC AUTO-ENTMCNC: 31.2 G/DL (ref 32–36)
MCV RBC AUTO: 91.2 FL (ref 80–100)
MICROALBUMIN UR-MCNC: 0.3 MG/DL
MONOCYTES # BLD AUTO: 299 CELLS/UL (ref 200–950)
MONOCYTES NFR BLD AUTO: 6.8 %
NEUTROPHILS # BLD AUTO: 2429 CELLS/UL (ref 1500–7800)
NEUTROPHILS NFR BLD AUTO: 55.2 %
NONHDLC SERPL-MCNC: 157 MG/DL (CALC)
PLATELET # BLD AUTO: 265 THOUSAND/UL (ref 140–400)
PMV BLD REES-ECKER: 11 FL (ref 7.5–12.5)
POTASSIUM SERPL-SCNC: 4.1 MMOL/L (ref 3.5–5.3)
PROT SERPL-MCNC: 7.4 G/DL (ref 6.1–8.1)
RBC # BLD AUTO: 4.64 MILLION/UL (ref 3.8–5.1)
SODIUM SERPL-SCNC: 136 MMOL/L (ref 135–146)
TRIGL SERPL-MCNC: 50 MG/DL
TSH SERPL-ACNC: 1.75 MIU/L
WBC # BLD AUTO: 4.4 THOUSAND/UL (ref 3.8–10.8)

## 2021-06-29 ENCOUNTER — OFFICE VISIT (OUTPATIENT)
Dept: OBSTETRICS AND GYNECOLOGY | Facility: CLINIC | Age: 41
End: 2021-06-29
Payer: COMMERCIAL

## 2021-06-29 VITALS
WEIGHT: 183.88 LBS | HEIGHT: 64 IN | DIASTOLIC BLOOD PRESSURE: 60 MMHG | BODY MASS INDEX: 31.39 KG/M2 | SYSTOLIC BLOOD PRESSURE: 120 MMHG

## 2021-06-29 DIAGNOSIS — Z90.710 S/P TAH (TOTAL ABDOMINAL HYSTERECTOMY): ICD-10-CM

## 2021-06-29 DIAGNOSIS — R30.9 PAIN WITH URINATION: Primary | ICD-10-CM

## 2021-06-29 PROCEDURE — 1126F PR PAIN SEVERITY QUANTIFIED, NO PAIN PRESENT: ICD-10-PCS | Mod: S$GLB,,, | Performed by: OBSTETRICS & GYNECOLOGY

## 2021-06-29 PROCEDURE — 3008F BODY MASS INDEX DOCD: CPT | Mod: CPTII,S$GLB,, | Performed by: OBSTETRICS & GYNECOLOGY

## 2021-06-29 PROCEDURE — 1126F AMNT PAIN NOTED NONE PRSNT: CPT | Mod: S$GLB,,, | Performed by: OBSTETRICS & GYNECOLOGY

## 2021-06-29 PROCEDURE — 3008F PR BODY MASS INDEX (BMI) DOCUMENTED: ICD-10-PCS | Mod: CPTII,S$GLB,, | Performed by: OBSTETRICS & GYNECOLOGY

## 2021-06-29 PROCEDURE — 99999 PR PBB SHADOW E&M-EST. PATIENT-LVL III: CPT | Mod: PBBFAC,,, | Performed by: OBSTETRICS & GYNECOLOGY

## 2021-06-29 PROCEDURE — 99213 OFFICE O/P EST LOW 20 MIN: CPT | Mod: S$GLB,,, | Performed by: OBSTETRICS & GYNECOLOGY

## 2021-06-29 PROCEDURE — 99213 PR OFFICE/OUTPT VISIT, EST, LEVL III, 20-29 MIN: ICD-10-PCS | Mod: S$GLB,,, | Performed by: OBSTETRICS & GYNECOLOGY

## 2021-06-29 PROCEDURE — 99999 PR PBB SHADOW E&M-EST. PATIENT-LVL III: ICD-10-PCS | Mod: PBBFAC,,, | Performed by: OBSTETRICS & GYNECOLOGY

## 2021-06-29 PROCEDURE — 87086 URINE CULTURE/COLONY COUNT: CPT | Performed by: OBSTETRICS & GYNECOLOGY

## 2021-07-01 LAB — BACTERIA UR CULT: NO GROWTH

## 2021-08-26 ENCOUNTER — PATIENT MESSAGE (OUTPATIENT)
Dept: OBSTETRICS AND GYNECOLOGY | Facility: CLINIC | Age: 41
End: 2021-08-26

## 2021-08-26 RX ORDER — FLUCONAZOLE 150 MG/1
150 TABLET ORAL ONCE
Qty: 1 TABLET | Refills: 0 | Status: SHIPPED | OUTPATIENT
Start: 2021-08-26 | End: 2021-08-26

## 2021-09-16 RX ORDER — CHLORTHALIDONE 25 MG/1
25 TABLET ORAL DAILY
Qty: 30 TABLET | Refills: 3 | Status: SHIPPED | OUTPATIENT
Start: 2021-09-16 | End: 2022-04-21 | Stop reason: SDUPTHER

## 2021-09-16 RX ORDER — ATORVASTATIN CALCIUM 20 MG/1
20 TABLET, FILM COATED ORAL DAILY
Qty: 30 TABLET | Refills: 3 | Status: SHIPPED | OUTPATIENT
Start: 2021-09-16 | End: 2022-04-21 | Stop reason: SDUPTHER

## 2021-09-20 ENCOUNTER — PATIENT MESSAGE (OUTPATIENT)
Dept: OBSTETRICS AND GYNECOLOGY | Facility: CLINIC | Age: 41
End: 2021-09-20

## 2021-09-20 ENCOUNTER — TELEPHONE (OUTPATIENT)
Dept: OBSTETRICS AND GYNECOLOGY | Facility: CLINIC | Age: 41
End: 2021-09-20

## 2021-09-20 DIAGNOSIS — Z12.31 VISIT FOR SCREENING MAMMOGRAM: Primary | ICD-10-CM

## 2021-10-13 ENCOUNTER — PATIENT MESSAGE (OUTPATIENT)
Dept: OBSTETRICS AND GYNECOLOGY | Facility: CLINIC | Age: 41
End: 2021-10-13
Payer: COMMERCIAL

## 2021-10-19 ENCOUNTER — HOSPITAL ENCOUNTER (OUTPATIENT)
Dept: RADIOLOGY | Facility: OTHER | Age: 41
Discharge: HOME | End: 2021-10-19
Attending: OBSTETRICS & GYNECOLOGY
Payer: COMMERCIAL

## 2021-10-19 DIAGNOSIS — Z12.31 VISIT FOR SCREENING MAMMOGRAM: ICD-10-CM

## 2021-10-19 PROCEDURE — 77063 BREAST TOMOSYNTHESIS BI: CPT | Mod: 26,,, | Performed by: RADIOLOGY

## 2021-10-19 PROCEDURE — 77067 SCR MAMMO BI INCL CAD: CPT | Mod: 26,,, | Performed by: RADIOLOGY

## 2021-10-19 PROCEDURE — 77067 MAMMO DIGITAL SCREENING BILAT WITH TOMO: ICD-10-PCS | Mod: 26,,, | Performed by: RADIOLOGY

## 2021-10-19 PROCEDURE — 77067 SCR MAMMO BI INCL CAD: CPT | Mod: TC

## 2021-10-19 PROCEDURE — 77063 MAMMO DIGITAL SCREENING BILAT WITH TOMO: ICD-10-PCS | Mod: 26,,, | Performed by: RADIOLOGY

## 2021-10-22 ENCOUNTER — PATIENT MESSAGE (OUTPATIENT)
Dept: OBSTETRICS AND GYNECOLOGY | Facility: CLINIC | Age: 41
End: 2021-10-22
Payer: COMMERCIAL

## 2021-10-22 RX ORDER — METRONIDAZOLE 7.5 MG/G
1 GEL VAGINAL DAILY
Qty: 70 G | Refills: 0 | Status: SHIPPED | OUTPATIENT
Start: 2021-10-22 | End: 2021-10-27

## 2021-11-11 ENCOUNTER — OFFICE VISIT (OUTPATIENT)
Dept: FAMILY MEDICINE | Facility: CLINIC | Age: 41
End: 2021-11-11
Payer: COMMERCIAL

## 2021-11-11 VITALS
BODY MASS INDEX: 32.78 KG/M2 | DIASTOLIC BLOOD PRESSURE: 88 MMHG | SYSTOLIC BLOOD PRESSURE: 138 MMHG | HEIGHT: 64 IN | HEART RATE: 68 BPM | WEIGHT: 192 LBS

## 2021-11-11 DIAGNOSIS — Z13.89 SCREENING FOR BLOOD OR PROTEIN IN URINE: ICD-10-CM

## 2021-11-11 DIAGNOSIS — E78.2 MIXED HYPERLIPIDEMIA: ICD-10-CM

## 2021-11-11 DIAGNOSIS — Z13.6 SCREENING FOR ISCHEMIC HEART DISEASE (IHD): ICD-10-CM

## 2021-11-11 DIAGNOSIS — I10 ESSENTIAL HYPERTENSION: Primary | ICD-10-CM

## 2021-11-11 DIAGNOSIS — Z79.899 LONG-TERM USE OF HIGH-RISK MEDICATION: ICD-10-CM

## 2021-11-11 DIAGNOSIS — Z13.29 SCREENING FOR ENDOCRINE DISORDER: ICD-10-CM

## 2021-11-11 PROCEDURE — 3075F SYST BP GE 130 - 139MM HG: CPT | Mod: S$GLB,,, | Performed by: FAMILY MEDICINE

## 2021-11-11 PROCEDURE — 99214 PR OFFICE/OUTPT VISIT, EST, LEVL IV, 30-39 MIN: ICD-10-PCS | Mod: S$GLB,,, | Performed by: FAMILY MEDICINE

## 2021-11-11 PROCEDURE — 3075F PR MOST RECENT SYSTOLIC BLOOD PRESS GE 130-139MM HG: ICD-10-PCS | Mod: S$GLB,,, | Performed by: FAMILY MEDICINE

## 2021-11-11 PROCEDURE — 3008F BODY MASS INDEX DOCD: CPT | Mod: S$GLB,,, | Performed by: FAMILY MEDICINE

## 2021-11-11 PROCEDURE — 3008F PR BODY MASS INDEX (BMI) DOCUMENTED: ICD-10-PCS | Mod: S$GLB,,, | Performed by: FAMILY MEDICINE

## 2021-11-11 PROCEDURE — 3079F PR MOST RECENT DIASTOLIC BLOOD PRESSURE 80-89 MM HG: ICD-10-PCS | Mod: S$GLB,,, | Performed by: FAMILY MEDICINE

## 2021-11-11 PROCEDURE — 3079F DIAST BP 80-89 MM HG: CPT | Mod: S$GLB,,, | Performed by: FAMILY MEDICINE

## 2021-11-11 PROCEDURE — 1160F RVW MEDS BY RX/DR IN RCRD: CPT | Mod: S$GLB,,, | Performed by: FAMILY MEDICINE

## 2021-11-11 PROCEDURE — 3061F NEG MICROALBUMINURIA REV: CPT | Mod: S$GLB,,, | Performed by: FAMILY MEDICINE

## 2021-11-11 PROCEDURE — 3066F PR DOCUMENTATION OF TREATMENT FOR NEPHROPATHY: ICD-10-PCS | Mod: S$GLB,,, | Performed by: FAMILY MEDICINE

## 2021-11-11 PROCEDURE — 3061F PR NEG MICROALBUMINURIA RESULT DOCUMENTED/REVIEW: ICD-10-PCS | Mod: S$GLB,,, | Performed by: FAMILY MEDICINE

## 2021-11-11 PROCEDURE — 99214 OFFICE O/P EST MOD 30 MIN: CPT | Mod: S$GLB,,, | Performed by: FAMILY MEDICINE

## 2021-11-11 PROCEDURE — 1160F PR REVIEW ALL MEDS BY PRESCRIBER/CLIN PHARMACIST DOCUMENTED: ICD-10-PCS | Mod: S$GLB,,, | Performed by: FAMILY MEDICINE

## 2021-11-11 PROCEDURE — 3066F NEPHROPATHY DOC TX: CPT | Mod: S$GLB,,, | Performed by: FAMILY MEDICINE

## 2021-11-13 ENCOUNTER — PATIENT MESSAGE (OUTPATIENT)
Dept: OBSTETRICS AND GYNECOLOGY | Facility: CLINIC | Age: 41
End: 2021-11-13
Payer: COMMERCIAL

## 2021-11-26 ENCOUNTER — OFFICE VISIT (OUTPATIENT)
Dept: OBSTETRICS AND GYNECOLOGY | Facility: CLINIC | Age: 41
End: 2021-11-26
Payer: COMMERCIAL

## 2021-11-26 VITALS
SYSTOLIC BLOOD PRESSURE: 124 MMHG | BODY MASS INDEX: 33.38 KG/M2 | DIASTOLIC BLOOD PRESSURE: 70 MMHG | HEIGHT: 64 IN | WEIGHT: 195.56 LBS

## 2021-11-26 DIAGNOSIS — N89.8 VAGINAL DISCHARGE: Primary | ICD-10-CM

## 2021-11-26 PROCEDURE — 3061F NEG MICROALBUMINURIA REV: CPT | Mod: CPTII,S$GLB,, | Performed by: STUDENT IN AN ORGANIZED HEALTH CARE EDUCATION/TRAINING PROGRAM

## 2021-11-26 PROCEDURE — 87481 CANDIDA DNA AMP PROBE: CPT | Mod: 59 | Performed by: STUDENT IN AN ORGANIZED HEALTH CARE EDUCATION/TRAINING PROGRAM

## 2021-11-26 PROCEDURE — 99999 PR PBB SHADOW E&M-EST. PATIENT-LVL III: ICD-10-PCS | Mod: PBBFAC,,, | Performed by: STUDENT IN AN ORGANIZED HEALTH CARE EDUCATION/TRAINING PROGRAM

## 2021-11-26 PROCEDURE — 99213 PR OFFICE/OUTPT VISIT, EST, LEVL III, 20-29 MIN: ICD-10-PCS | Mod: S$GLB,,, | Performed by: STUDENT IN AN ORGANIZED HEALTH CARE EDUCATION/TRAINING PROGRAM

## 2021-11-26 PROCEDURE — 3066F NEPHROPATHY DOC TX: CPT | Mod: CPTII,S$GLB,, | Performed by: STUDENT IN AN ORGANIZED HEALTH CARE EDUCATION/TRAINING PROGRAM

## 2021-11-26 PROCEDURE — 3061F PR NEG MICROALBUMINURIA RESULT DOCUMENTED/REVIEW: ICD-10-PCS | Mod: CPTII,S$GLB,, | Performed by: STUDENT IN AN ORGANIZED HEALTH CARE EDUCATION/TRAINING PROGRAM

## 2021-11-26 PROCEDURE — 99213 OFFICE O/P EST LOW 20 MIN: CPT | Mod: S$GLB,,, | Performed by: STUDENT IN AN ORGANIZED HEALTH CARE EDUCATION/TRAINING PROGRAM

## 2021-11-26 PROCEDURE — 3066F PR DOCUMENTATION OF TREATMENT FOR NEPHROPATHY: ICD-10-PCS | Mod: CPTII,S$GLB,, | Performed by: STUDENT IN AN ORGANIZED HEALTH CARE EDUCATION/TRAINING PROGRAM

## 2021-11-26 PROCEDURE — 99999 PR PBB SHADOW E&M-EST. PATIENT-LVL III: CPT | Mod: PBBFAC,,, | Performed by: STUDENT IN AN ORGANIZED HEALTH CARE EDUCATION/TRAINING PROGRAM

## 2021-12-01 ENCOUNTER — PATIENT MESSAGE (OUTPATIENT)
Dept: OBSTETRICS AND GYNECOLOGY | Facility: CLINIC | Age: 41
End: 2021-12-01
Payer: COMMERCIAL

## 2021-12-02 LAB
BACTERIAL VAGINOSIS DNA: NEGATIVE
CANDIDA GLABRATA DNA: NEGATIVE
CANDIDA KRUSEI DNA: NEGATIVE
CANDIDA RRNA VAG QL PROBE: NEGATIVE
T VAGINALIS RRNA GENITAL QL PROBE: NEGATIVE

## 2022-01-12 ENCOUNTER — OFFICE VISIT (OUTPATIENT)
Dept: OBSTETRICS AND GYNECOLOGY | Facility: CLINIC | Age: 42
End: 2022-01-12
Payer: COMMERCIAL

## 2022-01-12 VITALS
DIASTOLIC BLOOD PRESSURE: 70 MMHG | WEIGHT: 200.63 LBS | BODY MASS INDEX: 34.25 KG/M2 | HEIGHT: 64 IN | SYSTOLIC BLOOD PRESSURE: 130 MMHG

## 2022-01-12 DIAGNOSIS — Z90.710 S/P TAH (TOTAL ABDOMINAL HYSTERECTOMY): ICD-10-CM

## 2022-01-12 DIAGNOSIS — N89.8 VAGINAL DISCHARGE: Primary | ICD-10-CM

## 2022-01-12 DIAGNOSIS — I10 ESSENTIAL HYPERTENSION: ICD-10-CM

## 2022-01-12 DIAGNOSIS — N89.8 GRANULATION TISSUE OF VAGINAL CUFF: ICD-10-CM

## 2022-01-12 PROCEDURE — 3008F PR BODY MASS INDEX (BMI) DOCUMENTED: ICD-10-PCS | Mod: CPTII,S$GLB,, | Performed by: OBSTETRICS & GYNECOLOGY

## 2022-01-12 PROCEDURE — 3075F SYST BP GE 130 - 139MM HG: CPT | Mod: CPTII,S$GLB,, | Performed by: OBSTETRICS & GYNECOLOGY

## 2022-01-12 PROCEDURE — 87801 DETECT AGNT MULT DNA AMPLI: CPT | Performed by: OBSTETRICS & GYNECOLOGY

## 2022-01-12 PROCEDURE — 99999 PR PBB SHADOW E&M-EST. PATIENT-LVL III: CPT | Mod: PBBFAC,,, | Performed by: OBSTETRICS & GYNECOLOGY

## 2022-01-12 PROCEDURE — 3075F PR MOST RECENT SYSTOLIC BLOOD PRESS GE 130-139MM HG: ICD-10-PCS | Mod: CPTII,S$GLB,, | Performed by: OBSTETRICS & GYNECOLOGY

## 2022-01-12 PROCEDURE — 3078F PR MOST RECENT DIASTOLIC BLOOD PRESSURE < 80 MM HG: ICD-10-PCS | Mod: CPTII,S$GLB,, | Performed by: OBSTETRICS & GYNECOLOGY

## 2022-01-12 PROCEDURE — 99999 PR PBB SHADOW E&M-EST. PATIENT-LVL III: ICD-10-PCS | Mod: PBBFAC,,, | Performed by: OBSTETRICS & GYNECOLOGY

## 2022-01-12 PROCEDURE — 99214 OFFICE O/P EST MOD 30 MIN: CPT | Mod: S$GLB,,, | Performed by: OBSTETRICS & GYNECOLOGY

## 2022-01-12 PROCEDURE — 1159F MED LIST DOCD IN RCRD: CPT | Mod: CPTII,S$GLB,, | Performed by: OBSTETRICS & GYNECOLOGY

## 2022-01-12 PROCEDURE — 87481 CANDIDA DNA AMP PROBE: CPT | Mod: 59 | Performed by: OBSTETRICS & GYNECOLOGY

## 2022-01-12 PROCEDURE — 3078F DIAST BP <80 MM HG: CPT | Mod: CPTII,S$GLB,, | Performed by: OBSTETRICS & GYNECOLOGY

## 2022-01-12 PROCEDURE — 99214 PR OFFICE/OUTPT VISIT, EST, LEVL IV, 30-39 MIN: ICD-10-PCS | Mod: S$GLB,,, | Performed by: OBSTETRICS & GYNECOLOGY

## 2022-01-12 PROCEDURE — 3008F BODY MASS INDEX DOCD: CPT | Mod: CPTII,S$GLB,, | Performed by: OBSTETRICS & GYNECOLOGY

## 2022-01-12 PROCEDURE — 1159F PR MEDICATION LIST DOCUMENTED IN MEDICAL RECORD: ICD-10-PCS | Mod: CPTII,S$GLB,, | Performed by: OBSTETRICS & GYNECOLOGY

## 2022-01-17 NOTE — PROGRESS NOTES
HISTORY OF PRESENT ILLNESS:    Sophie Courtney is a 41 y.o. female  Patient's last menstrual period was 2021 (lmp unknown). presents today complaining of intermittent brown vaginal discharge since her hysterectomy.      Past Medical History:   Diagnosis Date    Hyperlipidemia     Hypertension        Past Surgical History:   Procedure Laterality Date    ACHILLES TENDON SURGERY Left     hysterecotmy      KNEE ARTHROSCOPY W/ ACL RECONSTRUCTION Right     MYOMECTOMY      Seven uterine fibroids, largest 11 cm in size     MYOMECTOMY      The specimen weighs 779 g and consists of 27 rounded pieces of rubbery tan-pink tissue. The nodules vary from    SALPINGOOPHORECTOMY Right 3/12/2021    Procedure: SALPINGO-OOPHORECTOMY;  Surgeon: Peace Sandoval MD;  Location: [facility];  Service: OB/GYN;  Laterality: Right;    TOTAL ABDOMINAL HYSTERECTOMY N/A 3/12/2021    Procedure: HYSTERECTOMY, TOTAL, ABDOMINAL;  Surgeon: Peace Sandoval MD;  [facility];  Service: OB/GYN;  Laterality: N/A;    WISDOM TOOTH EXTRACTION  2013 &         MEDICATIONS AND ALLERGIES:      Current Outpatient Medications:     ascorbic acid, vitamin C, (VITAMIN C) 1000 MG tablet, Take 1,000 mg by mouth once daily., Disp: , Rfl:     atorvastatin (LIPITOR) 20 MG tablet, Take 1 tablet (20 mg total) by mouth once daily., Disp: 30 tablet, Rfl: 3    chlorthalidone (HYGROTEN) 25 MG Tab, Take 1 tablet (25 mg total) by mouth once daily., Disp: 30 tablet, Rfl: 3    ELDERBERRY FRUIT ORAL, Take by mouth., Disp: , Rfl:     ergocalciferol, vitamin D2, (VITAMIN D ORAL), Take by mouth., Disp: , Rfl:     multivitamin capsule, Take 1 capsule by mouth once daily., Disp: , Rfl:     Review of patient's allergies indicates:   Allergen Reactions    Cobalt Hives, Rash and Swelling    Crestor [rosuvastatin] Other (See Comments)     Muscle weakness    Nickel sutures [surgical stainless steel] Hives     "Neosporin [hydrocortisone] Rash       COMPREHENSIVE GYN HISTORY:  PAP History: Denies abnormal Paps.  Infection History: Denies STDs. Denies PID.  Benign History: Denies uterine fibroids. Denies ovarian cysts. Denies endometriosis. Denies other conditions.  Cancer History: Denies cervical cancer. Denies uterine cancer or hyperplasia. Denies ovarian cancer. Denies vulvar cancer or pre-cancer. Denies vaginal cancer or pre-cancer. Denies breast cancer. Denies colon cancer.  Sexual Activity History: Reports currently being sexually active  Menstrual History: Every 28 days, flows for 4 days. Light flow.  Dysmenorrhea History: Denies dysmenorrhea.     ROS:  GENERAL: No fever or chills.  BREASTS: No pain. No lumps. No discharge.  ABDOMEN: No pain. No nausea. No vomiting. No diarrhea. No constipation.  REPRODUCTIVE: No abnormal bleeding.   VULVA: No pain. No lesions. No itching.  VAGINA: No relaxation. No itching. No odor. No discharge. No lesions.  URINARY: No incontinence. No nocturia. No frequency. No dysuria.    /70   Ht 5' 4" (1.626 m)   Wt 91 kg (200 lb 9.9 oz)   LMP 03/11/2021 (LMP Unknown)   BMI 34.44 kg/m²     PE:  APPEARANCE: Well nourished, well developed, in no acute distress.  AFFECT: WNL, alert and oriented x 3.  ABDOMEN: Soft. No tenderness or masses. No hepatosplenomegaly. No hernias.  PELVIC: Normal external female genitalia without lesions. Normal hair distribution. Adequate perineal body, normal urethral meatus. Vagina pink and well rugated without lesions or discharge. Small area of granulation tissue seen, treated with silver nitrate.     PROCEDURES:    1. Vaginal discharge    2. Essential hypertension    3. S/P OSMIN (total abdominal hysterectomy)/RSO    4. Granulation tissue of vaginal cuff        PLAN:    Orders Placed This Encounter    Vaginosis Screen by DNA Probe       COUNSELING:  The patient was counseled today on:    I spent a total of 26  minutes on the day of the visit. addressing " problems separate from annual exam.  This includes face to face time and non-face to face time preparing to see the patient (eg, review of tests), Obtaining and/or reviewing separately obtained history, Documenting clinical information in the electronic or other health record, Independently interpreting resultsand communicating results to the patient/family/caregiver, or Care coordination.     FOLLOW-UP with me in 4 weeks   Pelvic rest   Answers for HPI/ROS submitted by the patient on 2022  Chronicity: recurrent  Onset: more than 1 month ago  Frequency: constantly  Progression since onset: unchanged  Pain severity: no pain  Affected side: both  Pregnant now?: No  abdominal pain: No  anorexia: No  back pain: No  chills: No  constipation: No  diarrhea: No  discolored urine: No  dysuria: No  fever: No  flank pain: No  frequency: No  headaches: No  hematuria: No  nausea: No  painful intercourse: No  rash: No  urgency: No  vomiting: No  Please select the characteristics of your discharge: : brown  Vaginal bleeding: no bleeding  Passing clots?: No  Passing tissue?: No  Aggravated by: nothing  treatments tried: oral narcotics  Improvement on treatment: no relief  Sexual activity: not sexually active  Partner with STD symptoms: no  Birth control: abstinence, hysterectomy  STD: No  abdominal surgery: Yes   section: No  Ectopic pregnancy: No  Endometriosis: Yes  herpes simplex: No  gynecological surgery: Yes  menorrhagia: Yes  metrorrhagia: No  miscarriage: No  ovarian cysts: No  perineal abscess: No  PID: No  terminated pregnancy: No  vaginosis: Yes

## 2022-02-10 ENCOUNTER — OFFICE VISIT (OUTPATIENT)
Dept: OBSTETRICS AND GYNECOLOGY | Facility: CLINIC | Age: 42
End: 2022-02-10
Payer: COMMERCIAL

## 2022-02-10 VITALS
BODY MASS INDEX: 33.35 KG/M2 | WEIGHT: 200.19 LBS | SYSTOLIC BLOOD PRESSURE: 122 MMHG | HEIGHT: 65 IN | DIASTOLIC BLOOD PRESSURE: 70 MMHG

## 2022-02-10 DIAGNOSIS — N89.8 GRANULATION TISSUE OF VAGINAL CUFF: ICD-10-CM

## 2022-02-10 DIAGNOSIS — Z90.710 S/P TAH (TOTAL ABDOMINAL HYSTERECTOMY): Primary | ICD-10-CM

## 2022-02-10 PROCEDURE — 3008F PR BODY MASS INDEX (BMI) DOCUMENTED: ICD-10-PCS | Mod: CPTII,S$GLB,, | Performed by: OBSTETRICS & GYNECOLOGY

## 2022-02-10 PROCEDURE — 99213 OFFICE O/P EST LOW 20 MIN: CPT | Mod: S$GLB,,, | Performed by: OBSTETRICS & GYNECOLOGY

## 2022-02-10 PROCEDURE — 3078F PR MOST RECENT DIASTOLIC BLOOD PRESSURE < 80 MM HG: ICD-10-PCS | Mod: CPTII,S$GLB,, | Performed by: OBSTETRICS & GYNECOLOGY

## 2022-02-10 PROCEDURE — 3074F SYST BP LT 130 MM HG: CPT | Mod: CPTII,S$GLB,, | Performed by: OBSTETRICS & GYNECOLOGY

## 2022-02-10 PROCEDURE — 3008F BODY MASS INDEX DOCD: CPT | Mod: CPTII,S$GLB,, | Performed by: OBSTETRICS & GYNECOLOGY

## 2022-02-10 PROCEDURE — 99999 PR PBB SHADOW E&M-EST. PATIENT-LVL II: CPT | Mod: PBBFAC,,, | Performed by: OBSTETRICS & GYNECOLOGY

## 2022-02-10 PROCEDURE — 99213 PR OFFICE/OUTPT VISIT, EST, LEVL III, 20-29 MIN: ICD-10-PCS | Mod: S$GLB,,, | Performed by: OBSTETRICS & GYNECOLOGY

## 2022-02-10 PROCEDURE — 1160F PR REVIEW ALL MEDS BY PRESCRIBER/CLIN PHARMACIST DOCUMENTED: ICD-10-PCS | Mod: CPTII,S$GLB,, | Performed by: OBSTETRICS & GYNECOLOGY

## 2022-02-10 PROCEDURE — 1159F MED LIST DOCD IN RCRD: CPT | Mod: CPTII,S$GLB,, | Performed by: OBSTETRICS & GYNECOLOGY

## 2022-02-10 PROCEDURE — 99999 PR PBB SHADOW E&M-EST. PATIENT-LVL II: ICD-10-PCS | Mod: PBBFAC,,, | Performed by: OBSTETRICS & GYNECOLOGY

## 2022-02-10 PROCEDURE — 3078F DIAST BP <80 MM HG: CPT | Mod: CPTII,S$GLB,, | Performed by: OBSTETRICS & GYNECOLOGY

## 2022-02-10 PROCEDURE — 1160F RVW MEDS BY RX/DR IN RCRD: CPT | Mod: CPTII,S$GLB,, | Performed by: OBSTETRICS & GYNECOLOGY

## 2022-02-10 PROCEDURE — 1159F PR MEDICATION LIST DOCUMENTED IN MEDICAL RECORD: ICD-10-PCS | Mod: CPTII,S$GLB,, | Performed by: OBSTETRICS & GYNECOLOGY

## 2022-02-10 PROCEDURE — 3074F PR MOST RECENT SYSTOLIC BLOOD PRESSURE < 130 MM HG: ICD-10-PCS | Mod: CPTII,S$GLB,, | Performed by: OBSTETRICS & GYNECOLOGY

## 2022-02-28 NOTE — PROGRESS NOTES
HISTORY OF PRESENT ILLNESS:    Sophie Courtney is a 41 y.o. female  Patient's last menstrual period was 2021 (lmp unknown). presents today for follow up.   Intermittent brown vaginal discharge improved.       Past Medical History:   Diagnosis Date    Hyperlipidemia     Hypertension        Past Surgical History:   Procedure Laterality Date    ACHILLES TENDON SURGERY Left     hysterecotmy      KNEE ARTHROSCOPY W/ ACL RECONSTRUCTION Right     MYOMECTOMY      Seven uterine fibroids, largest 11 cm in size     MYOMECTOMY      The specimen weighs 779 g and consists of 27 rounded pieces of rubbery tan-pink tissue. The nodules vary from    SALPINGOOPHORECTOMY Right 3/12/2021    Procedure: SALPINGO-OOPHORECTOMY;  Surgeon: Peace Sandoval MD;  Location: [facility];  Service: OB/GYN;  Laterality: Right;    TOTAL ABDOMINAL HYSTERECTOMY N/A 3/12/2021    Procedure: HYSTERECTOMY, TOTAL, ABDOMINAL;  Surgeon: Peace Sandoval MD;  Location: [facility];  Service: OB/GYN;  Laterality: N/A;    WISDOM TOOTH EXTRACTION  2013 &         MEDICATIONS AND ALLERGIES:      Current Outpatient Medications:     ascorbic acid, vitamin C, (VITAMIN C) 1000 MG tablet, Take 1,000 mg by mouth once daily., Disp: , Rfl:     atorvastatin (LIPITOR) 20 MG tablet, Take 1 tablet (20 mg total) by mouth once daily., Disp: 30 tablet, Rfl: 3    chlorthalidone (HYGROTEN) 25 MG Tab, Take 1 tablet (25 mg total) by mouth once daily., Disp: 30 tablet, Rfl: 3    ELDERBERRY FRUIT ORAL, Take by mouth., Disp: , Rfl:     ergocalciferol, vitamin D2, (VITAMIN D ORAL), Take by mouth. Per patient, she takes vitamin D 3 tab, not D 2., Disp: , Rfl:     multivitamin capsule, Take 1 capsule by mouth once daily., Disp: , Rfl:     Review of patient's allergies indicates:   Allergen Reactions    Cobalt Hives, Rash and Swelling    Crestor [rosuvastatin] Other (See Comments)     Muscle weakness    Nickel sutures  "[surgical stainless steel] Hives    Neosporin [hydrocortisone] Rash       COMPREHENSIVE GYN HISTORY:  PAP History: Denies abnormal Paps.  Infection History: Denies STDs. Denies PID.  Benign History: Denies uterine fibroids. Denies ovarian cysts. Denies endometriosis. Denies other conditions.  Cancer History: Denies cervical cancer. Denies uterine cancer or hyperplasia. Denies ovarian cancer. Denies vulvar cancer or pre-cancer. Denies vaginal cancer or pre-cancer. Denies breast cancer. Denies colon cancer.  Sexual Activity History: Reports currently being sexually active  Menstrual History: Every 28 days, flows for 4 days. Light flow.  Dysmenorrhea History: Denies dysmenorrhea.     ROS:  GENERAL: No fever or chills.  BREASTS: No pain. No lumps. No discharge.  ABDOMEN: No pain. No nausea. No vomiting. No diarrhea. No constipation.  REPRODUCTIVE: No abnormal bleeding.   VULVA: No pain. No lesions. No itching.  VAGINA: No relaxation. No itching. No odor. No discharge. No lesions.  URINARY: No incontinence. No nocturia. No frequency. No dysuria.    /70   Ht 5' 5" (1.651 m)   Wt 90.8 kg (200 lb 2.8 oz)   LMP 03/11/2021 (LMP Unknown)   BMI 33.31 kg/m²     PE:  APPEARANCE: Well nourished, well developed, in no acute distress.  AFFECT: WNL, alert and oriented x 3.  ABDOMEN: Soft. No tenderness or masses. No hepatosplenomegaly. No hernias.  PELVIC: Normal external female genitalia without lesions. Normal hair distribution. Adequate perineal body, normal urethral meatus. Vagina pink and well rugated without lesions or discharge.   Area of granulation improved. Small area of granulation tissue seen, treated with silver nitrate.       1. S/P OSMIN (total abdominal hysterectomy)/RSO    2. Granulation tissue of vaginal cuff        COUNSELING:  The patient was counseled today on:    I spent a total of 26  minutes on the day of the visit. addressing problems separate from annual exam.  This includes face to face time and " non-face to face time preparing to see the patient (eg, review of tests), Obtaining and/or reviewing separately obtained history, Documenting clinical information in the electronic or other health record, Independently interpreting resultsand communicating results to the patient/family/caregiver, or Care coordination.     FOLLOW-UP with me in 4 weeks   Pelvic rest   Answers for HPI/ROS submitted by the patient on 2022  Chronicity: recurrent  Onset: more than 1 month ago  Frequency: constantly  Progression since onset: unchanged  Pain severity: no pain  Affected side: both  Pregnant now?: No  abdominal pain: No  anorexia: No  back pain: No  chills: No  constipation: No  diarrhea: No  discolored urine: No  dysuria: No  fever: No  flank pain: No  frequency: No  headaches: No  hematuria: No  nausea: No  painful intercourse: No  rash: No  urgency: No  vomiting: No  Please select the characteristics of your discharge: : brown  Vaginal bleeding: no bleeding  Passing clots?: No  Passing tissue?: No  Aggravated by: nothing  treatments tried: oral narcotics  Improvement on treatment: no relief  Sexual activity: not sexually active  Partner with STD symptoms: no  Birth control: abstinence, hysterectomy  STD: No  abdominal surgery: Yes   section: No  Ectopic pregnancy: No  Endometriosis: Yes  herpes simplex: No  gynecological surgery: Yes  menorrhagia: Yes  metrorrhagia: No  miscarriage: No  ovarian cysts: No  perineal abscess: No  PID: No  terminated pregnancy: No  vaginosis: Yes

## 2022-03-15 ENCOUNTER — OFFICE VISIT (OUTPATIENT)
Dept: OBSTETRICS AND GYNECOLOGY | Facility: CLINIC | Age: 42
End: 2022-03-15
Payer: COMMERCIAL

## 2022-03-15 VITALS
SYSTOLIC BLOOD PRESSURE: 154 MMHG | DIASTOLIC BLOOD PRESSURE: 98 MMHG | WEIGHT: 203.06 LBS | BODY MASS INDEX: 33.83 KG/M2 | HEIGHT: 65 IN

## 2022-03-15 DIAGNOSIS — N89.8 GRANULATION TISSUE OF VAGINAL CUFF: Primary | ICD-10-CM

## 2022-03-15 PROCEDURE — 3008F BODY MASS INDEX DOCD: CPT | Mod: CPTII,S$GLB,, | Performed by: OBSTETRICS & GYNECOLOGY

## 2022-03-15 PROCEDURE — 1159F PR MEDICATION LIST DOCUMENTED IN MEDICAL RECORD: ICD-10-PCS | Mod: CPTII,S$GLB,, | Performed by: OBSTETRICS & GYNECOLOGY

## 2022-03-15 PROCEDURE — 3080F PR MOST RECENT DIASTOLIC BLOOD PRESSURE >= 90 MM HG: ICD-10-PCS | Mod: CPTII,S$GLB,, | Performed by: OBSTETRICS & GYNECOLOGY

## 2022-03-15 PROCEDURE — 99213 OFFICE O/P EST LOW 20 MIN: CPT | Mod: S$GLB,,, | Performed by: OBSTETRICS & GYNECOLOGY

## 2022-03-15 PROCEDURE — 3077F PR MOST RECENT SYSTOLIC BLOOD PRESSURE >= 140 MM HG: ICD-10-PCS | Mod: CPTII,S$GLB,, | Performed by: OBSTETRICS & GYNECOLOGY

## 2022-03-15 PROCEDURE — 3008F PR BODY MASS INDEX (BMI) DOCUMENTED: ICD-10-PCS | Mod: CPTII,S$GLB,, | Performed by: OBSTETRICS & GYNECOLOGY

## 2022-03-15 PROCEDURE — 3080F DIAST BP >= 90 MM HG: CPT | Mod: CPTII,S$GLB,, | Performed by: OBSTETRICS & GYNECOLOGY

## 2022-03-15 PROCEDURE — 99999 PR PBB SHADOW E&M-EST. PATIENT-LVL III: CPT | Mod: PBBFAC,,, | Performed by: OBSTETRICS & GYNECOLOGY

## 2022-03-15 PROCEDURE — 1159F MED LIST DOCD IN RCRD: CPT | Mod: CPTII,S$GLB,, | Performed by: OBSTETRICS & GYNECOLOGY

## 2022-03-15 PROCEDURE — 1160F PR REVIEW ALL MEDS BY PRESCRIBER/CLIN PHARMACIST DOCUMENTED: ICD-10-PCS | Mod: CPTII,S$GLB,, | Performed by: OBSTETRICS & GYNECOLOGY

## 2022-03-15 PROCEDURE — 99999 PR PBB SHADOW E&M-EST. PATIENT-LVL III: ICD-10-PCS | Mod: PBBFAC,,, | Performed by: OBSTETRICS & GYNECOLOGY

## 2022-03-15 PROCEDURE — 99213 PR OFFICE/OUTPT VISIT, EST, LEVL III, 20-29 MIN: ICD-10-PCS | Mod: S$GLB,,, | Performed by: OBSTETRICS & GYNECOLOGY

## 2022-03-15 PROCEDURE — 1160F RVW MEDS BY RX/DR IN RCRD: CPT | Mod: CPTII,S$GLB,, | Performed by: OBSTETRICS & GYNECOLOGY

## 2022-03-15 PROCEDURE — 3077F SYST BP >= 140 MM HG: CPT | Mod: CPTII,S$GLB,, | Performed by: OBSTETRICS & GYNECOLOGY

## 2022-03-15 NOTE — PROGRESS NOTES
HISTORY OF PRESENT ILLNESS:    Sophie Courtney is a 41 y.o. female  Patient's last menstrual period was 2021 (lmp unknown). presents today for follow up.   Intermittent brown vaginal discharge almost resolved.     Past Medical History:   Diagnosis Date    Hyperlipidemia     Hypertension        Past Surgical History:   Procedure Laterality Date    ACHILLES TENDON SURGERY Left     hysterecotmy      KNEE ARTHROSCOPY W/ ACL RECONSTRUCTION Right     MYOMECTOMY      Seven uterine fibroids, largest 11 cm in size     MYOMECTOMY      The specimen weighs 779 g and consists of 27 rounded pieces of rubbery tan-pink tissue. The nodules vary from    SALPINGOOPHORECTOMY Right 3/12/2021    Procedure: SALPINGO-OOPHORECTOMY;  Surgeon: Peace Sandoval MD;  Location: [facility];  Service: OB/GYN;  Laterality: Right;    TOTAL ABDOMINAL HYSTERECTOMY N/A 3/12/2021    Procedure: HYSTERECTOMY, TOTAL, ABDOMINAL;  Surgeon: Peace Sandoval MD;  Location: [facility] OR;  Service: OB/GYN;  Laterality: N/A;    WISDOM TOOTH EXTRACTION  2013 &         MEDICATIONS AND ALLERGIES:      Current Outpatient Medications:     ascorbic acid, vitamin C, (VITAMIN C) 1000 MG tablet, Take 1,000 mg by mouth once daily., Disp: , Rfl:     atorvastatin (LIPITOR) 20 MG tablet, Take 1 tablet (20 mg total) by mouth once daily., Disp: 30 tablet, Rfl: 3    chlorthalidone (HYGROTEN) 25 MG Tab, Take 1 tablet (25 mg total) by mouth once daily., Disp: 30 tablet, Rfl: 3    ELDERBERRY FRUIT ORAL, Take by mouth., Disp: , Rfl:     ergocalciferol, vitamin D2, (VITAMIN D ORAL), Take by mouth. Per patient, she takes vitamin D 3 tab, not D 2., Disp: , Rfl:     multivitamin capsule, Take 1 capsule by mouth once daily., Disp: , Rfl:     Review of patient's allergies indicates:   Allergen Reactions    Cobalt Hives, Rash and Swelling    Crestor [rosuvastatin] Other (See Comments)     Muscle weakness    Nickel sutures  "[surgical stainless steel] Hives    Neosporin [hydrocortisone] Rash       COMPREHENSIVE GYN HISTORY:  PAP History: Denies abnormal Paps.  Infection History: Denies STDs. Denies PID.  Benign History: Denies uterine fibroids. Denies ovarian cysts. Denies endometriosis. Denies other conditions.  Cancer History: Denies cervical cancer. Denies uterine cancer or hyperplasia. Denies ovarian cancer. Denies vulvar cancer or pre-cancer. Denies vaginal cancer or pre-cancer. Denies breast cancer. Denies colon cancer.  Sexual Activity History: Reports currently being sexually active  Menstrual History: Every 28 days, flows for 4 days. Light flow.  Dysmenorrhea History: Denies dysmenorrhea.     ROS:  GENERAL: No fever or chills.  BREASTS: No pain. No lumps. No discharge.  ABDOMEN: No pain. No nausea. No vomiting. No diarrhea. No constipation.  REPRODUCTIVE: No abnormal bleeding.   VULVA: No pain. No lesions. No itching.  VAGINA: No relaxation. No itching. No odor. No discharge. No lesions.  URINARY: No incontinence. No nocturia. No frequency. No dysuria.    BP (!) 154/98   Ht 5' 5" (1.651 m)   Wt 92.1 kg (203 lb 0.7 oz)   LMP 03/11/2021 (LMP Unknown)   BMI 33.79 kg/m²     PE:  APPEARANCE: Well nourished, well developed, in no acute distress.  AFFECT: WNL, alert and oriented x 3.  ABDOMEN: Soft. No tenderness or masses. No hepatosplenomegaly. No hernias.  PELVIC: Normal external female genitalia without lesions. Normal hair distribution. Adequate perineal body, normal urethral meatus. Vagina pink and well rugated without lesions or discharge.   Area of granulation improved. Small area of granulation tissue seen, treated with silver nitrate.       1. Granulation tissue of vaginal cuff        COUNSELING:  The patient was counseled today on:    I spent a total of 26  minutes on the day of the visit. addressing problems separate from annual exam.  This includes face to face time and non-face to face time preparing to see the " patient (eg, review of tests), Obtaining and/or reviewing separately obtained history, Documenting clinical information in the electronic or other health record, Independently interpreting resultsand communicating results to the patient/family/caregiver, or Care coordination.     FOLLOW-UP with me in 6 weeks   Pelvic rest

## 2022-04-18 ENCOUNTER — TELEPHONE (OUTPATIENT)
Dept: FAMILY MEDICINE | Facility: CLINIC | Age: 42
End: 2022-04-18

## 2022-04-18 NOTE — TELEPHONE ENCOUNTER
----- Message from India Frank sent at 4/18/2022 10:48 AM CDT -----  Patient called and stated that her blood pressure has been up for the last couple of weeks and she would like to know if she can come in before her appointment in May please give her a call at [phone]

## 2022-04-21 ENCOUNTER — OFFICE VISIT (OUTPATIENT)
Dept: FAMILY MEDICINE | Facility: CLINIC | Age: 42
End: 2022-04-21
Payer: COMMERCIAL

## 2022-04-21 VITALS
BODY MASS INDEX: 33.15 KG/M2 | SYSTOLIC BLOOD PRESSURE: 150 MMHG | DIASTOLIC BLOOD PRESSURE: 88 MMHG | HEART RATE: 72 BPM | HEIGHT: 65 IN | WEIGHT: 199 LBS

## 2022-04-21 DIAGNOSIS — Z76.0 MEDICATION REFILL: ICD-10-CM

## 2022-04-21 DIAGNOSIS — I10 ESSENTIAL HYPERTENSION: Primary | ICD-10-CM

## 2022-04-21 PROCEDURE — 3008F BODY MASS INDEX DOCD: CPT | Mod: S$GLB,,, | Performed by: FAMILY MEDICINE

## 2022-04-21 PROCEDURE — 3077F PR MOST RECENT SYSTOLIC BLOOD PRESSURE >= 140 MM HG: ICD-10-PCS | Mod: S$GLB,,, | Performed by: FAMILY MEDICINE

## 2022-04-21 PROCEDURE — 99213 OFFICE O/P EST LOW 20 MIN: CPT | Mod: S$GLB,,, | Performed by: FAMILY MEDICINE

## 2022-04-21 PROCEDURE — 3079F PR MOST RECENT DIASTOLIC BLOOD PRESSURE 80-89 MM HG: ICD-10-PCS | Mod: S$GLB,,, | Performed by: FAMILY MEDICINE

## 2022-04-21 PROCEDURE — 1160F RVW MEDS BY RX/DR IN RCRD: CPT | Mod: S$GLB,,, | Performed by: FAMILY MEDICINE

## 2022-04-21 PROCEDURE — 3079F DIAST BP 80-89 MM HG: CPT | Mod: S$GLB,,, | Performed by: FAMILY MEDICINE

## 2022-04-21 PROCEDURE — 3077F SYST BP >= 140 MM HG: CPT | Mod: S$GLB,,, | Performed by: FAMILY MEDICINE

## 2022-04-21 PROCEDURE — 99213 PR OFFICE/OUTPT VISIT, EST, LEVL III, 20-29 MIN: ICD-10-PCS | Mod: S$GLB,,, | Performed by: FAMILY MEDICINE

## 2022-04-21 PROCEDURE — 3008F PR BODY MASS INDEX (BMI) DOCUMENTED: ICD-10-PCS | Mod: S$GLB,,, | Performed by: FAMILY MEDICINE

## 2022-04-21 PROCEDURE — 1160F PR REVIEW ALL MEDS BY PRESCRIBER/CLIN PHARMACIST DOCUMENTED: ICD-10-PCS | Mod: S$GLB,,, | Performed by: FAMILY MEDICINE

## 2022-04-21 RX ORDER — CHLORTHALIDONE 25 MG/1
25 TABLET ORAL DAILY
Qty: 90 TABLET | Refills: 1 | Status: SHIPPED | OUTPATIENT
Start: 2022-04-21 | End: 2023-04-25 | Stop reason: SDUPTHER

## 2022-04-21 RX ORDER — AMLODIPINE BESYLATE 2.5 MG/1
2.5 TABLET ORAL DAILY
Qty: 90 TABLET | Refills: 1 | Status: SHIPPED | OUTPATIENT
Start: 2022-04-21 | End: 2023-01-04

## 2022-04-21 RX ORDER — ATORVASTATIN CALCIUM 20 MG/1
20 TABLET, FILM COATED ORAL DAILY
Qty: 90 TABLET | Refills: 1 | Status: SHIPPED | OUTPATIENT
Start: 2022-04-21 | End: 2023-07-05 | Stop reason: SDUPTHER

## 2022-04-21 NOTE — PROGRESS NOTES
SUBJECTIVE:    Patient ID: Sophie Courtney is a 41 y.o. female.    Chief Complaint: Hypertension (Went over meds verbally// SW)    Pt here to checkup on acute and chronic conditions.    BP is elevated today. Has been to GYN lately and has noticed that her BP has been high and has been taking it at home and has been taking it at home.  Denies CP/SOB. Sometimes has been having slight headache lately. Doesn't have one right now. Has been taking chlorthalidone.  Has been taking BP at home and its been running a little high. Used to take atenolol.       S/p OSMIN with Left ovary left 3/2021. Just released by GYN last month. Has been exercising again for the last 2 weeks. Goal weight is 180 or so.    Had labs on the day of her last visit: , Hct 42.3, TSH 1.75.      Office Visit on 01/12/2022   Component Date Value Ref Range Status    Trichomonas vaginalis 01/12/2022 Negative  Negative Final    Candida sp 01/12/2022 Negative  Negative Final    Fatemeh glabrata DNA 01/12/2022 Negative  Negative Final    Fatemeh krusei DNA 01/12/2022 Negative  Negative Final    Bacterial vaginosis DNA 01/12/2022 Negative  Negative Final   Office Visit on 11/26/2021   Component Date Value Ref Range Status    Trichomonas vaginalis 11/26/2021 Negative  Negative Final    Candida sp 11/26/2021 Negative  Negative Final    Fatemeh glabrata DNA 11/26/2021 Negative  Negative Final    Fatemeh krusei DNA 11/26/2021 Negative  Negative Final    Bacterial vaginosis DNA 11/26/2021 Negative  Negative Final       Past Medical History:   Diagnosis Date    Hyperlipidemia     Hypertension      Social History     Socioeconomic History    Marital status: Single   Tobacco Use    Smoking status: Never Smoker    Smokeless tobacco: Never Used   Substance and Sexual Activity    Alcohol use: No    Drug use: No    Sexual activity: Never     Past Surgical History:   Procedure Laterality Date    ACHILLES TENDON SURGERY Left 2003     hysterecotmy      KNEE ARTHROSCOPY W/ ACL RECONSTRUCTION Right 1999    MYOMECTOMY  2009    Seven uterine fibroids, largest 11 cm in size     MYOMECTOMY  2014    The specimen weighs 779 g and consists of 27 rounded pieces of rubbery tan-pink tissue. The nodules vary from    SALPINGOOPHORECTOMY Right 3/12/2021    Procedure: SALPINGO-OOPHORECTOMY;  Surgeon: Peace Sandoval MD;  Location: [facility] OR;  Service: OB/GYN;  Laterality: Right;    TOTAL ABDOMINAL HYSTERECTOMY N/A 3/12/2021    Procedure: HYSTERECTOMY, TOTAL, ABDOMINAL;  Surgeon: Peace Sandoval MD;  Location: [facility] OR;  Service: OB/GYN;  Laterality: N/A;    WISDOM TOOTH EXTRACTION  2013 2013 & 2015      Family History   Problem Relation Age of Onset    Hypertension Father     Hypertension Mother     Stroke Maternal Aunt     Breast cancer Paternal Aunt 60       Review of patient's allergies indicates:   Allergen Reactions    Cobalt Hives, Rash and Swelling    Crestor [rosuvastatin] Other (See Comments)     Muscle weakness    Nickel sutures [surgical stainless steel] Hives    Neosporin [hydrocortisone] Rash       Current Outpatient Medications:     ascorbic acid, vitamin C, (VITAMIN C) 1000 MG tablet, Take 1,000 mg by mouth once daily., Disp: , Rfl:     ELDERBERRY FRUIT ORAL, Take by mouth., Disp: , Rfl:     ergocalciferol, vitamin D2, (VITAMIN D ORAL), Take by mouth. Per patient, she takes vitamin D 3 tab, not D 2., Disp: , Rfl:     multivitamin capsule, Take 1 capsule by mouth once daily., Disp: , Rfl:     amLODIPine (NORVASC) 2.5 MG tablet, Take 1 tablet (2.5 mg total) by mouth once daily., Disp: 90 tablet, Rfl: 1    atorvastatin (LIPITOR) 20 MG tablet, Take 1 tablet (20 mg total) by mouth once daily., Disp: 90 tablet, Rfl: 1    chlorthalidone (HYGROTEN) 25 MG Tab, Take 1 tablet (25 mg total) by mouth once daily., Disp: 90 tablet, Rfl: 1    Review of Systems   Constitutional: Negative for appetite change, fatigue, fever and  "unexpected weight change.   Respiratory: Negative for cough, chest tightness, shortness of breath and wheezing.    Cardiovascular: Negative for chest pain and leg swelling.   Gastrointestinal: Negative for abdominal pain, constipation, nausea and vomiting.        -heartburn   Genitourinary: Negative for difficulty urinating, dysuria, frequency, menstrual problem and urgency.   Musculoskeletal: Negative for arthralgias, back pain, myalgias and neck pain.   Skin: Negative for rash.   Neurological: Negative for dizziness, weakness, numbness and headaches.   Hematological: Does not bruise/bleed easily.   Psychiatric/Behavioral: Negative for dysphoric mood, sleep disturbance and suicidal ideas. The patient is not nervous/anxious.    All other systems reviewed and are negative.         Objective:      Vitals:    04/21/22 1345 04/21/22 1347   BP: (!) 172/98 (!) 150/88   Pulse: 72    Weight: 90.3 kg (199 lb)    Height: 5' 5" (1.651 m)      Wt Readings from Last 12 Encounters:   04/21/22 90.3 kg (199 lb)   03/15/22 92.1 kg (203 lb 0.7 oz)   02/10/22 90.8 kg (200 lb 2.8 oz)   01/12/22 91 kg (200 lb 9.9 oz)   11/26/21 88.7 kg (195 lb 8.8 oz)   11/11/21 87.1 kg (192 lb)   06/29/21 83.4 kg (183 lb 13.8 oz)   05/25/21 81.6 kg (180 lb)   05/13/21 82 kg (180 lb 12.4 oz)   04/22/21 80 kg (176 lb 5.9 oz)   04/01/21 80 kg (176 lb 5.9 oz)   03/23/21 79 kg (174 lb 2.6 oz)           Physical Exam  Vitals reviewed.   Constitutional:       General: She is not in acute distress.     Appearance: Normal appearance. She is well-developed. She is obese.   HENT:      Head: Normocephalic and atraumatic.   Neck:      Thyroid: No thyromegaly.   Cardiovascular:      Rate and Rhythm: Normal rate and regular rhythm.      Heart sounds: Normal heart sounds. No murmur heard.    No friction rub.   Pulmonary:      Effort: Pulmonary effort is normal.      Breath sounds: Normal breath sounds. No wheezing or rales.   Abdominal:      General: Bowel sounds are " normal. There is no distension.      Palpations: Abdomen is soft.      Tenderness: There is no abdominal tenderness.   Musculoskeletal:      Cervical back: Neck supple.   Lymphadenopathy:      Cervical: No cervical adenopathy.   Skin:     General: Skin is warm and dry.      Findings: No rash.   Neurological:      Mental Status: She is alert and oriented to person, place, and time.   Psychiatric:         Attention and Perception: She is attentive.         Speech: Speech normal.         Behavior: Behavior normal.         Thought Content: Thought content normal.         Judgment: Judgment normal.           Assessment:       1. Essential hypertension    2. Medication refill         Plan:       Essential hypertension  Comments:  Uncontrolled. Will give prn amlodipine. Pt to continue BP log at home.  Orders:  -     chlorthalidone (HYGROTEN) 25 MG Tab; Take 1 tablet (25 mg total) by mouth once daily.  Dispense: 90 tablet; Refill: 1  -     amLODIPine (NORVASC) 2.5 MG tablet; Take 1 tablet (2.5 mg total) by mouth once daily.  Dispense: 90 tablet; Refill: 1    Medication refill  -     atorvastatin (LIPITOR) 20 MG tablet; Take 1 tablet (20 mg total) by mouth once daily.  Dispense: 90 tablet; Refill: 1    Labs and/or tests have been ordered for the evaluation/monitoring of acute/chronic conditions, to be done just before next visit.    Follow up in about 2 months (around 6/21/2022) for HTN, LABS.        4/21/2022 Rohan Pringle

## 2022-04-26 ENCOUNTER — OFFICE VISIT (OUTPATIENT)
Dept: OBSTETRICS AND GYNECOLOGY | Facility: CLINIC | Age: 42
End: 2022-04-26
Payer: COMMERCIAL

## 2022-04-26 VITALS
DIASTOLIC BLOOD PRESSURE: 70 MMHG | HEIGHT: 65 IN | BODY MASS INDEX: 33.43 KG/M2 | WEIGHT: 200.63 LBS | SYSTOLIC BLOOD PRESSURE: 120 MMHG

## 2022-04-26 DIAGNOSIS — N89.8 VAGINAL GRANULATION TISSUE: ICD-10-CM

## 2022-04-26 DIAGNOSIS — Z90.710 S/P TAH (TOTAL ABDOMINAL HYSTERECTOMY): Primary | ICD-10-CM

## 2022-04-26 PROCEDURE — 99999 PR PBB SHADOW E&M-EST. PATIENT-LVL III: ICD-10-PCS | Mod: PBBFAC,,, | Performed by: OBSTETRICS & GYNECOLOGY

## 2022-04-26 PROCEDURE — 99213 OFFICE O/P EST LOW 20 MIN: CPT | Mod: S$GLB,,, | Performed by: OBSTETRICS & GYNECOLOGY

## 2022-04-26 PROCEDURE — 3008F BODY MASS INDEX DOCD: CPT | Mod: CPTII,S$GLB,, | Performed by: OBSTETRICS & GYNECOLOGY

## 2022-04-26 PROCEDURE — 99999 PR PBB SHADOW E&M-EST. PATIENT-LVL III: CPT | Mod: PBBFAC,,, | Performed by: OBSTETRICS & GYNECOLOGY

## 2022-04-26 PROCEDURE — 1160F RVW MEDS BY RX/DR IN RCRD: CPT | Mod: CPTII,S$GLB,, | Performed by: OBSTETRICS & GYNECOLOGY

## 2022-04-26 PROCEDURE — 1159F MED LIST DOCD IN RCRD: CPT | Mod: CPTII,S$GLB,, | Performed by: OBSTETRICS & GYNECOLOGY

## 2022-04-26 PROCEDURE — 3078F PR MOST RECENT DIASTOLIC BLOOD PRESSURE < 80 MM HG: ICD-10-PCS | Mod: CPTII,S$GLB,, | Performed by: OBSTETRICS & GYNECOLOGY

## 2022-04-26 PROCEDURE — 3074F SYST BP LT 130 MM HG: CPT | Mod: CPTII,S$GLB,, | Performed by: OBSTETRICS & GYNECOLOGY

## 2022-04-26 PROCEDURE — 1159F PR MEDICATION LIST DOCUMENTED IN MEDICAL RECORD: ICD-10-PCS | Mod: CPTII,S$GLB,, | Performed by: OBSTETRICS & GYNECOLOGY

## 2022-04-26 PROCEDURE — 3078F DIAST BP <80 MM HG: CPT | Mod: CPTII,S$GLB,, | Performed by: OBSTETRICS & GYNECOLOGY

## 2022-04-26 PROCEDURE — 99213 PR OFFICE/OUTPT VISIT, EST, LEVL III, 20-29 MIN: ICD-10-PCS | Mod: S$GLB,,, | Performed by: OBSTETRICS & GYNECOLOGY

## 2022-04-26 PROCEDURE — 1160F PR REVIEW ALL MEDS BY PRESCRIBER/CLIN PHARMACIST DOCUMENTED: ICD-10-PCS | Mod: CPTII,S$GLB,, | Performed by: OBSTETRICS & GYNECOLOGY

## 2022-04-26 PROCEDURE — 3008F PR BODY MASS INDEX (BMI) DOCUMENTED: ICD-10-PCS | Mod: CPTII,S$GLB,, | Performed by: OBSTETRICS & GYNECOLOGY

## 2022-04-26 PROCEDURE — 3074F PR MOST RECENT SYSTOLIC BLOOD PRESSURE < 130 MM HG: ICD-10-PCS | Mod: CPTII,S$GLB,, | Performed by: OBSTETRICS & GYNECOLOGY

## 2022-05-02 NOTE — PROGRESS NOTES
HISTORY OF PRESENT ILLNESS:    Sophie Courtney is a 41 y.o. female  Patient's last menstrual period was 2021 (lmp unknown). presents today for follow up.  She reports that discharge has resolved.      Past Medical History:   Diagnosis Date    Hyperlipidemia     Hypertension        Past Surgical History:   Procedure Laterality Date    ACHILLES TENDON SURGERY Left     hysterecotmy      KNEE ARTHROSCOPY W/ ACL RECONSTRUCTION Right     MYOMECTOMY      Seven uterine fibroids, largest 11 cm in size     MYOMECTOMY      The specimen weighs 779 g and consists of 27 rounded pieces of rubbery tan-pink tissue. The nodules vary from    SALPINGOOPHORECTOMY Right 3/12/2021    Procedure: SALPINGO-OOPHORECTOMY;  Surgeon: Peace Sandoval MD;  Location: [facility];  Service: OB/GYN;  Laterality: Right;    TOTAL ABDOMINAL HYSTERECTOMY N/A 3/12/2021    Procedure: HYSTERECTOMY, TOTAL, ABDOMINAL;  Surgeon: Peace Sandoval MD;  Location: [facility] OR;  Service: OB/GYN;  Laterality: N/A;    WISDOM TOOTH EXTRACTION  2013 &         MEDICATIONS AND ALLERGIES:      Current Outpatient Medications:     amLODIPine (NORVASC) 2.5 MG tablet, Take 1 tablet (2.5 mg total) by mouth once daily., Disp: 90 tablet, Rfl: 1    ascorbic acid, vitamin C, (VITAMIN C) 1000 MG tablet, Take 1,000 mg by mouth once daily., Disp: , Rfl:     atorvastatin (LIPITOR) 20 MG tablet, Take 1 tablet (20 mg total) by mouth once daily., Disp: 90 tablet, Rfl: 1    chlorthalidone (HYGROTEN) 25 MG Tab, Take 1 tablet (25 mg total) by mouth once daily., Disp: 90 tablet, Rfl: 1    ELDERBERRY FRUIT ORAL, Take by mouth., Disp: , Rfl:     ergocalciferol, vitamin D2, (VITAMIN D ORAL), Take by mouth. Per patient, she takes vitamin D 3 tab, not D 2., Disp: , Rfl:     multivitamin capsule, Take 1 capsule by mouth once daily., Disp: , Rfl:     Review of patient's allergies indicates:   Allergen Reactions    Cobalt Hives,  "Rash and Swelling    Crestor [rosuvastatin] Other (See Comments)     Muscle weakness    Nickel sutures [surgical stainless steel] Hives    Neosporin [hydrocortisone] Rash       COMPREHENSIVE GYN HISTORY:  PAP History: Denies abnormal Paps.  Infection History: Denies STDs. Denies PID.  Benign History: Denies uterine fibroids. Denies ovarian cysts. Denies endometriosis. Denies other conditions.  Cancer History: Denies cervical cancer. Denies uterine cancer or hyperplasia. Denies ovarian cancer. Denies vulvar cancer or pre-cancer. Denies vaginal cancer or pre-cancer. Denies breast cancer. Denies colon cancer.  Sexual Activity History: Reports currently being sexually active  Menstrual History: Every 28 days, flows for 4 days. Light flow.  Dysmenorrhea History: Denies dysmenorrhea.     ROS:  GENERAL: No fever or chills.  BREASTS: No pain. No lumps. No discharge.  ABDOMEN: No pain. No nausea. No vomiting. No diarrhea. No constipation.  REPRODUCTIVE: No abnormal bleeding.   VULVA: No pain. No lesions. No itching.  VAGINA: No relaxation. No itching. No odor. No discharge. No lesions.  URINARY: No incontinence. No nocturia. No frequency. No dysuria.    /70   Ht 5' 5" (1.651 m)   Wt 91 kg (200 lb 9.9 oz)   LMP 03/11/2021 (LMP Unknown)   BMI 33.38 kg/m²     PE:  APPEARANCE: Well nourished, well developed, in no acute distress.  AFFECT: WNL, alert and oriented x 3.  ABDOMEN: Soft. No tenderness or masses. No hepatosplenomegaly. No hernias.  PELVIC: Normal external female genitalia without lesions. Normal hair distribution. Adequate perineal body, normal urethral meatus. Vagina pink and well rugated without lesions or discharge.  Vaginal cuff well healed      1. S/P OSMIN (total abdominal hysterectomy)/RSO    2. Vaginal granulation tissue        COUNSELING:  The patient was counseled today on:    I spent a total of 26  minutes on the day of the visit. addressing problems separate from annual exam.  This includes face " to face time and non-face to face time preparing to see the patient (eg, review of tests), Obtaining and/or reviewing separately obtained history, Documenting clinical information in the electronic or other health record, Independently interpreting resultsand communicating results to the patient/family/caregiver, or Care coordination.     FOLLOW-UP with me in 6 months

## 2022-06-08 ENCOUNTER — TELEPHONE (OUTPATIENT)
Dept: FAMILY MEDICINE | Facility: CLINIC | Age: 42
End: 2022-06-08

## 2022-06-16 LAB
ALBUMIN SERPL-MCNC: 4.2 G/DL (ref 3.6–5.1)
ALBUMIN/CREAT UR: 4 MCG/MG CREAT
ALBUMIN/GLOB SERPL: 1.6 (CALC) (ref 1–2.5)
ALP SERPL-CCNC: 45 U/L (ref 31–125)
ALT SERPL-CCNC: 16 U/L (ref 6–29)
APPEARANCE UR: CLEAR
AST SERPL-CCNC: 16 U/L (ref 10–30)
BACTERIA #/AREA URNS HPF: NORMAL /HPF
BACTERIA UR CULT: NORMAL
BASOPHILS # BLD AUTO: 19 CELLS/UL (ref 0–200)
BASOPHILS NFR BLD AUTO: 0.4 %
BILIRUB SERPL-MCNC: 0.9 MG/DL (ref 0.2–1.2)
BILIRUB UR QL STRIP: NEGATIVE
BUN SERPL-MCNC: 11 MG/DL (ref 7–25)
BUN/CREAT SERPL: NORMAL (CALC) (ref 6–22)
CALCIUM SERPL-MCNC: 9.2 MG/DL (ref 8.6–10.2)
CHLORIDE SERPL-SCNC: 103 MMOL/L (ref 98–110)
CHOLEST SERPL-MCNC: 211 MG/DL
CHOLEST/HDLC SERPL: 4 (CALC)
CO2 SERPL-SCNC: 29 MMOL/L (ref 20–32)
COLOR UR: YELLOW
CREAT SERPL-MCNC: 0.84 MG/DL (ref 0.5–1.1)
CREAT UR-MCNC: 284 MG/DL (ref 20–275)
EOSINOPHIL # BLD AUTO: 19 CELLS/UL (ref 15–500)
EOSINOPHIL NFR BLD AUTO: 0.4 %
ERYTHROCYTE [DISTWIDTH] IN BLOOD BY AUTOMATED COUNT: 12.6 % (ref 11–15)
GLOBULIN SER CALC-MCNC: 2.7 G/DL (CALC) (ref 1.9–3.7)
GLUCOSE SERPL-MCNC: 90 MG/DL (ref 65–99)
GLUCOSE UR QL STRIP: NEGATIVE
HCT VFR BLD AUTO: 41.2 % (ref 35–45)
HDLC SERPL-MCNC: 53 MG/DL
HGB BLD-MCNC: 13.7 G/DL (ref 11.7–15.5)
HGB UR QL STRIP: NEGATIVE
HYALINE CASTS #/AREA URNS LPF: NORMAL /LPF
KETONES UR QL STRIP: NEGATIVE
LDLC SERPL CALC-MCNC: 146 MG/DL (CALC)
LEUKOCYTE ESTERASE UR QL STRIP: NEGATIVE
LYMPHOCYTES # BLD AUTO: 1946 CELLS/UL (ref 850–3900)
LYMPHOCYTES NFR BLD AUTO: 41.4 %
MCH RBC QN AUTO: 30 PG (ref 27–33)
MCHC RBC AUTO-ENTMCNC: 33.3 G/DL (ref 32–36)
MCV RBC AUTO: 90.2 FL (ref 80–100)
MICROALBUMIN UR-MCNC: 1.1 MG/DL
MONOCYTES # BLD AUTO: 324 CELLS/UL (ref 200–950)
MONOCYTES NFR BLD AUTO: 6.9 %
NEUTROPHILS # BLD AUTO: 2392 CELLS/UL (ref 1500–7800)
NEUTROPHILS NFR BLD AUTO: 50.9 %
NITRITE UR QL STRIP: NEGATIVE
NONHDLC SERPL-MCNC: 158 MG/DL (CALC)
PH UR STRIP: 5.5 [PH] (ref 5–8)
PLATELET # BLD AUTO: 280 THOUSAND/UL (ref 140–400)
PMV BLD REES-ECKER: 11.3 FL (ref 7.5–12.5)
POTASSIUM SERPL-SCNC: 3.9 MMOL/L (ref 3.5–5.3)
PROT SERPL-MCNC: 6.9 G/DL (ref 6.1–8.1)
PROT UR QL STRIP: NEGATIVE
RBC # BLD AUTO: 4.57 MILLION/UL (ref 3.8–5.1)
RBC #/AREA URNS HPF: NORMAL /HPF
SODIUM SERPL-SCNC: 139 MMOL/L (ref 135–146)
SP GR UR STRIP: 1.03 (ref 1–1.03)
SQUAMOUS #/AREA URNS HPF: NORMAL /HPF
TRIGL SERPL-MCNC: 39 MG/DL
TSH SERPL-ACNC: 1.37 MIU/L
WBC # BLD AUTO: 4.7 THOUSAND/UL (ref 3.8–10.8)
WBC #/AREA URNS HPF: NORMAL /HPF

## 2022-06-22 ENCOUNTER — OFFICE VISIT (OUTPATIENT)
Dept: FAMILY MEDICINE | Facility: CLINIC | Age: 42
End: 2022-06-22
Payer: COMMERCIAL

## 2022-06-22 ENCOUNTER — TELEPHONE (OUTPATIENT)
Dept: FAMILY MEDICINE | Facility: CLINIC | Age: 42
End: 2022-06-22

## 2022-06-22 VITALS
BODY MASS INDEX: 31.49 KG/M2 | HEIGHT: 65 IN | DIASTOLIC BLOOD PRESSURE: 86 MMHG | OXYGEN SATURATION: 99 % | SYSTOLIC BLOOD PRESSURE: 128 MMHG | WEIGHT: 189 LBS | HEART RATE: 56 BPM

## 2022-06-22 DIAGNOSIS — Z79.899 LONG-TERM USE OF HIGH-RISK MEDICATION: Primary | ICD-10-CM

## 2022-06-22 DIAGNOSIS — E78.2 MIXED HYPERLIPIDEMIA: ICD-10-CM

## 2022-06-22 DIAGNOSIS — I10 ESSENTIAL HYPERTENSION: Primary | ICD-10-CM

## 2022-06-22 PROCEDURE — 3061F NEG MICROALBUMINURIA REV: CPT | Mod: CPTII,S$GLB,, | Performed by: FAMILY MEDICINE

## 2022-06-22 PROCEDURE — 3061F PR NEG MICROALBUMINURIA RESULT DOCUMENTED/REVIEW: ICD-10-PCS | Mod: CPTII,S$GLB,, | Performed by: FAMILY MEDICINE

## 2022-06-22 PROCEDURE — 1159F PR MEDICATION LIST DOCUMENTED IN MEDICAL RECORD: ICD-10-PCS | Mod: CPTII,S$GLB,, | Performed by: FAMILY MEDICINE

## 2022-06-22 PROCEDURE — 3066F NEPHROPATHY DOC TX: CPT | Mod: CPTII,S$GLB,, | Performed by: FAMILY MEDICINE

## 2022-06-22 PROCEDURE — 1159F MED LIST DOCD IN RCRD: CPT | Mod: CPTII,S$GLB,, | Performed by: FAMILY MEDICINE

## 2022-06-22 PROCEDURE — 1160F RVW MEDS BY RX/DR IN RCRD: CPT | Mod: CPTII,S$GLB,, | Performed by: FAMILY MEDICINE

## 2022-06-22 PROCEDURE — 3066F PR DOCUMENTATION OF TREATMENT FOR NEPHROPATHY: ICD-10-PCS | Mod: CPTII,S$GLB,, | Performed by: FAMILY MEDICINE

## 2022-06-22 PROCEDURE — 3074F PR MOST RECENT SYSTOLIC BLOOD PRESSURE < 130 MM HG: ICD-10-PCS | Mod: CPTII,S$GLB,, | Performed by: FAMILY MEDICINE

## 2022-06-22 PROCEDURE — 1160F PR REVIEW ALL MEDS BY PRESCRIBER/CLIN PHARMACIST DOCUMENTED: ICD-10-PCS | Mod: CPTII,S$GLB,, | Performed by: FAMILY MEDICINE

## 2022-06-22 PROCEDURE — 3079F PR MOST RECENT DIASTOLIC BLOOD PRESSURE 80-89 MM HG: ICD-10-PCS | Mod: CPTII,S$GLB,, | Performed by: FAMILY MEDICINE

## 2022-06-22 PROCEDURE — 3074F SYST BP LT 130 MM HG: CPT | Mod: CPTII,S$GLB,, | Performed by: FAMILY MEDICINE

## 2022-06-22 PROCEDURE — 3008F PR BODY MASS INDEX (BMI) DOCUMENTED: ICD-10-PCS | Mod: CPTII,S$GLB,, | Performed by: FAMILY MEDICINE

## 2022-06-22 PROCEDURE — 3079F DIAST BP 80-89 MM HG: CPT | Mod: CPTII,S$GLB,, | Performed by: FAMILY MEDICINE

## 2022-06-22 PROCEDURE — 3008F BODY MASS INDEX DOCD: CPT | Mod: CPTII,S$GLB,, | Performed by: FAMILY MEDICINE

## 2022-06-22 PROCEDURE — 99214 PR OFFICE/OUTPT VISIT, EST, LEVL IV, 30-39 MIN: ICD-10-PCS | Mod: S$GLB,,, | Performed by: FAMILY MEDICINE

## 2022-06-22 PROCEDURE — 99214 OFFICE O/P EST MOD 30 MIN: CPT | Mod: S$GLB,,, | Performed by: FAMILY MEDICINE

## 2022-06-22 NOTE — PROGRESS NOTES
SUBJECTIVE:    Patient ID: Sophie Courtney is a 41 y.o. female.    Chief Complaint: Hypertension (2mth, went over meds verbally// SW)    Pt here to checkup on acute and chronic conditions.    BP is doing today.   Denies CP/SOB/HA. Has been taking chlorthalindone, norvasc, lipitor. Has lost 11 pounds since last visit. Has a virgilio.    Had labs done: fBS 90, GFR 0.84, , micro/alb 4, TSH 1.7    Office Visit on 01/12/2022   Component Date Value Ref Range Status    Trichomonas vaginalis 01/12/2022 Negative  Negative Final    Candida sp 01/12/2022 Negative  Negative Final    Comment: Fatemeh species group includes: Candida albicans, Candida tropicalis,  Candida parapsiolosis, Fatemeh dubliniensis      Fatemeh glabrata DNA 01/12/2022 Negative  Negative Final    Fatemeh krusei DNA 01/12/2022 Negative  Negative Final    Bacterial vaginosis DNA 01/12/2022 Negative  Negative Final   Office Visit on 11/26/2021   Component Date Value Ref Range Status    Trichomonas vaginalis 11/26/2021 Negative  Negative Final    Candida sp 11/26/2021 Negative  Negative Final    Comment: Fatemeh species group includes: Candida albicans, Candida tropicalis,  Candida parapsiolosis, Fatemeh dubliniensis      Fatemeh glabrata DNA 11/26/2021 Negative  Negative Final    Fatemeh krusei DNA 11/26/2021 Negative  Negative Final    Bacterial vaginosis DNA 11/26/2021 Negative  Negative Final   Office Visit on 11/11/2021   Component Date Value Ref Range Status    Glucose 06/15/2022 90  65 - 99 mg/dL Final    Comment:               Fasting reference interval         BUN 06/15/2022 11  7 - 25 mg/dL Final    Creatinine 06/15/2022 0.84  0.50 - 1.10 mg/dL Final    eGFR if non  06/15/2022 85  > OR = 60 mL/min/1.73m2 Final    eGFR if  06/15/2022 99  > OR = 60 mL/min/1.73m2 Final    BUN/Creatinine Ratio 06/15/2022 NOT APPLICABLE  6 - 22 (calc) Final    Sodium 06/15/2022 139  135 - 146 mmol/L Final     Potassium 06/15/2022 3.9  3.5 - 5.3 mmol/L Final    Chloride 06/15/2022 103  98 - 110 mmol/L Final    CO2 06/15/2022 29  20 - 32 mmol/L Final    Calcium 06/15/2022 9.2  8.6 - 10.2 mg/dL Final    Total Protein 06/15/2022 6.9  6.1 - 8.1 g/dL Final    Albumin 06/15/2022 4.2  3.6 - 5.1 g/dL Final    Globulin, Total 06/15/2022 2.7  1.9 - 3.7 g/dL (calc) Final    Albumin/Globulin Ratio 06/15/2022 1.6  1.0 - 2.5 (calc) Final    Total Bilirubin 06/15/2022 0.9  0.2 - 1.2 mg/dL Final    Alkaline Phosphatase 06/15/2022 45  31 - 125 U/L Final    AST 06/15/2022 16  10 - 30 U/L Final    ALT 06/15/2022 16  6 - 29 U/L Final    Cholesterol 06/15/2022 211 (A) <200 mg/dL Final    HDL 06/15/2022 53  > OR = 50 mg/dL Final    Triglycerides 06/15/2022 39  <150 mg/dL Final    LDL Cholesterol 06/15/2022 146 (A) mg/dL (calc) Final    Comment: Reference range: <100     Desirable range <100 mg/dL for primary prevention;    <70 mg/dL for patients with CHD or diabetic patients   with > or = 2 CHD risk factors.     LDL-C is now calculated using the Michael-Murry   calculation, which is a validated novel method providing   better accuracy than the Friedewald equation in the   estimation of LDL-C.   Michael HARRELL et al. KEZIA. 2013;310(19): 2971-9330   (http://education.[employer] Mobile/faq/SKK633)      HDL/Cholesterol Ratio 06/15/2022 4.0  <5.0 (calc) Final    Non HDL Chol. (LDL+VLDL) 06/15/2022 158 (A) <130 mg/dL (calc) Final    Comment: For patients with diabetes plus 1 major ASCVD risk   factor, treating to a non-HDL-C goal of <100 mg/dL   (LDL-C of <70 mg/dL) is considered a therapeutic   option.      Creatinine, Urine 06/15/2022 284 (A) 20 - 275 mg/dL Final    Microalb, Ur 06/15/2022 1.1  See Note: mg/dL Final    Comment: Reference Range:    Reference Range  Not established      Microalb/Creat Ratio 06/15/2022 4  <30 mcg/mg creat Final    Comment:    The ADA defines abnormalities in albumin  excretion as follows:      Albuminuria Category        Result (mcg/mg creatinine)     Normal to Mildly increased   <30  Moderately increased            Severely increased           > OR = 300     The ADA recommends that at least two of three  specimens collected within a 3-6 month period be  abnormal before considering a patient to be  within a diagnostic category.      Color, UA 06/15/2022 YELLOW  YELLOW Final    Appearance, UA 06/15/2022 CLEAR  CLEAR Final    Specific [place], UA 06/15/2022 1.026  1.001 - 1.035 Final    pH, UA 06/15/2022 5.5  5.0 - 8.0 Final    Glucose, UA 06/15/2022 NEGATIVE  NEGATIVE Final    Bilirubin, UA 06/15/2022 NEGATIVE  NEGATIVE Final    Ketones, UA 06/15/2022 NEGATIVE  NEGATIVE Final    Occult Blood UA 06/15/2022 NEGATIVE  NEGATIVE Final    Protein, UA 06/15/2022 NEGATIVE  NEGATIVE Final    Nitrite, UA 06/15/2022 NEGATIVE  NEGATIVE Final    Leukocytes, UA 06/15/2022 NEGATIVE  NEGATIVE Final    WBC Casts, UA 06/15/2022 0-5  < OR = 5 /HPF Final    RBC Casts, UA 06/15/2022 NONE SEEN  < OR = 2 /HPF Final    Squam Epithel, UA 06/15/2022 0-5  < OR = 5 /HPF Final    Bacteria, UA 06/15/2022 NONE SEEN  NONE SEEN /HPF Final    Hyaline Casts, UA 06/15/2022 NONE SEEN  NONE SEEN /LPF Final    Reflexive Urine Culture 06/15/2022    Final    NO CULTURE INDICATED    TSH w/reflex to FT4 06/15/2022 1.37  mIU/L Final    Comment:           Reference Range                         > or = 20 Years  0.40-4.50                              Pregnancy Ranges            First trimester    0.26-2.66            Second trimester   0.55-2.73            Third trimester    0.43-2.91      WBC 06/15/2022 4.7  3.8 - 10.8 Thousand/uL Final    RBC 06/15/2022 4.57  3.80 - 5.10 Million/uL Final    Hemoglobin 06/15/2022 13.7  11.7 - 15.5 g/dL Final    Hematocrit 06/15/2022 41.2  35.0 - 45.0 % Final    MCV 06/15/2022 90.2  80.0 - 100.0 fL Final    MCH 06/15/2022 30.0  27.0 - 33.0 pg Final    MCHC 06/15/2022 33.3  32.0 - 36.0  g/dL Final    RDW 06/15/2022 12.6  11.0 - 15.0 % Final    Platelets 06/15/2022 280  140 - 400 Thousand/uL Final    MPV 06/15/2022 11.3  7.5 - 12.5 fL Final    Neutrophils, Abs 06/15/2022 2,392  1,500 - 7,800 cells/uL Final    Lymph # 06/15/2022 1,946  850 - 3,900 cells/uL Final    Mono # 06/15/2022 324  200 - 950 cells/uL Final    Eos # 06/15/2022 19  15 - 500 cells/uL Final    Baso # 06/15/2022 19  0 - 200 cells/uL Final    Neutrophils Relative 06/15/2022 50.9  % Final    Lymph % 06/15/2022 41.4  % Final    Mono % 06/15/2022 6.9  % Final    Eosinophil % 06/15/2022 0.4  % Final    Basophil % 06/15/2022 0.4  % Final         Office Visit on 01/12/2022   Component Date Value Ref Range Status    Trichomonas vaginalis 01/12/2022 Negative  Negative Final    Candida sp 01/12/2022 Negative  Negative Final    Fatemeh glabrata DNA 01/12/2022 Negative  Negative Final    Fatemeh krusei DNA 01/12/2022 Negative  Negative Final    Bacterial vaginosis DNA 01/12/2022 Negative  Negative Final       Past Medical History:   Diagnosis Date    Hyperlipidemia     Hypertension      Social History     Socioeconomic History    Marital status: Single   Tobacco Use    Smoking status: Never Smoker    Smokeless tobacco: Never Used   Substance and Sexual Activity    Alcohol use: No    Drug use: No    Sexual activity: Never     Past Surgical History:   Procedure Laterality Date    ACHILLES TENDON SURGERY Left 2003    hysterecotmy      KNEE ARTHROSCOPY W/ ACL RECONSTRUCTION Right 1999    MYOMECTOMY  2009    Seven uterine fibroids, largest 11 cm in size     MYOMECTOMY  2014    The specimen weighs 779 g and consists of 27 rounded pieces of rubbery tan-pink tissue. The nodules vary from    SALPINGOOPHORECTOMY Right 3/12/2021    Procedure: SALPINGO-OOPHORECTOMY;  Surgeon: Peace Sandoval MD;  Location: [facility];  Service: OB/GYN;  Laterality: Right;    TOTAL ABDOMINAL HYSTERECTOMY N/A 3/12/2021    Procedure:  HYSTERECTOMY, TOTAL, ABDOMINAL;  Surgeon: Peace Sandoval MD;  Location[facility];  Service: OB/GYN;  Laterality: N/A;    WISDOM TOOTH EXTRACTION  2013 2013 & 2015      Family History   Problem Relation Age of Onset    Hypertension Father     Hypertension Mother     Stroke Maternal Aunt     Breast cancer Paternal Aunt 60       Review of patient's allergies indicates:   Allergen Reactions    Cobalt Hives, Rash and Swelling    Crestor [rosuvastatin] Other (See Comments)     Muscle weakness    Nickel sutures [surgical stainless steel] Hives    Neosporin [hydrocortisone] Rash       Current Outpatient Medications:     amLODIPine (NORVASC) 2.5 MG tablet, Take 1 tablet (2.5 mg total) by mouth once daily., Disp: 90 tablet, Rfl: 1    ascorbic acid, vitamin C, (VITAMIN C) 1000 MG tablet, Take 1,000 mg by mouth once daily., Disp: , Rfl:     atorvastatin (LIPITOR) 20 MG tablet, Take 1 tablet (20 mg total) by mouth once daily., Disp: 90 tablet, Rfl: 1    chlorthalidone (HYGROTEN) 25 MG Tab, Take 1 tablet (25 mg total) by mouth once daily., Disp: 90 tablet, Rfl: 1    ELDERBERRY FRUIT ORAL, Take by mouth., Disp: , Rfl:     ergocalciferol, vitamin D2, (VITAMIN D ORAL), Take by mouth. Per patient, she takes vitamin D 3 tab, not D 2., Disp: , Rfl:     multivitamin capsule, Take 1 capsule by mouth once daily., Disp: , Rfl:     Review of Systems   Constitutional: Negative for appetite change, fatigue, fever and unexpected weight change.   Respiratory: Negative for cough, chest tightness, shortness of breath and wheezing.    Cardiovascular: Negative for chest pain and leg swelling.   Gastrointestinal: Negative for abdominal pain, constipation, nausea and vomiting.        -heartburn   Genitourinary: Negative for difficulty urinating, dysuria, frequency, menstrual problem and urgency.   Musculoskeletal: Negative for arthralgias, back pain, myalgias and neck pain.   Skin: Negative for rash.   Neurological: Negative  "for dizziness, weakness, numbness and headaches.   Hematological: Does not bruise/bleed easily.   Psychiatric/Behavioral: Negative for dysphoric mood, sleep disturbance and suicidal ideas. The patient is not nervous/anxious.    All other systems reviewed and are negative.         Objective:      Vitals:    06/22/22 1507   BP: 128/86   Pulse: (!) 56   SpO2: 99%   Weight: 85.7 kg (189 lb)   Height: 5' 5" (1.651 m)     Wt Readings from Last 3 Encounters:   06/22/22 85.7 kg (189 lb)   04/26/22 91 kg (200 lb 9.9 oz)   04/21/22 90.3 kg (199 lb)       Physical Exam  Vitals reviewed.   Constitutional:       General: She is not in acute distress.     Appearance: Normal appearance. She is well-developed. She is obese.   HENT:      Head: Normocephalic and atraumatic.   Neck:      Thyroid: No thyromegaly.   Cardiovascular:      Rate and Rhythm: Normal rate and regular rhythm.      Heart sounds: Normal heart sounds. No murmur heard.    No friction rub.   Pulmonary:      Effort: Pulmonary effort is normal.      Breath sounds: Normal breath sounds. No wheezing or rales.   Abdominal:      General: Bowel sounds are normal. There is no distension.      Palpations: Abdomen is soft.      Tenderness: There is no abdominal tenderness.   Musculoskeletal:      Cervical back: Neck supple.   Lymphadenopathy:      Cervical: No cervical adenopathy.   Skin:     General: Skin is warm and dry.      Findings: No rash.   Neurological:      Mental Status: She is alert and oriented to person, place, and time.   Psychiatric:         Attention and Perception: She is attentive.         Speech: Speech normal.         Behavior: Behavior normal.         Thought Content: Thought content normal.         Judgment: Judgment normal.           Assessment:       1. Essential hypertension    2. Mixed hyperlipidemia         Plan:       Essential hypertension  Comments:  Controlled. Will continue to monitor BP on current medication regimen    Mixed " hyperlipidemia  Comments:  Uncontrolled. . Encouraged to take lipitor    Labs and/or tests have been ordered for the evaluation/monitoring of acute/chronic conditions, to be done just before next visit.    Follow up in about 6 months (around 12/22/2022) for HTN, HLD.        6/22/2022 Rohan Pringle

## 2022-10-04 ENCOUNTER — OFFICE VISIT (OUTPATIENT)
Dept: OBSTETRICS AND GYNECOLOGY | Facility: CLINIC | Age: 42
End: 2022-10-04
Payer: COMMERCIAL

## 2022-10-04 VITALS
DIASTOLIC BLOOD PRESSURE: 83 MMHG | BODY MASS INDEX: 28.76 KG/M2 | WEIGHT: 172.81 LBS | SYSTOLIC BLOOD PRESSURE: 128 MMHG

## 2022-10-04 DIAGNOSIS — Z01.419 ENCOUNTER FOR GYNECOLOGICAL EXAMINATION WITHOUT ABNORMAL FINDING: ICD-10-CM

## 2022-10-04 DIAGNOSIS — Z12.31 VISIT FOR SCREENING MAMMOGRAM: Primary | ICD-10-CM

## 2022-10-04 DIAGNOSIS — Z90.710 S/P TAH (TOTAL ABDOMINAL HYSTERECTOMY): ICD-10-CM

## 2022-10-04 DIAGNOSIS — I10 ESSENTIAL HYPERTENSION: ICD-10-CM

## 2022-10-04 PROCEDURE — 99396 PREV VISIT EST AGE 40-64: CPT | Mod: S$GLB,,, | Performed by: OBSTETRICS & GYNECOLOGY

## 2022-10-04 PROCEDURE — 99396 PR PREVENTIVE VISIT,EST,40-64: ICD-10-PCS | Mod: S$GLB,,, | Performed by: OBSTETRICS & GYNECOLOGY

## 2022-10-04 PROCEDURE — 99999 PR PBB SHADOW E&M-EST. PATIENT-LVL III: CPT | Mod: PBBFAC,,, | Performed by: OBSTETRICS & GYNECOLOGY

## 2022-10-04 PROCEDURE — 99999 PR PBB SHADOW E&M-EST. PATIENT-LVL III: ICD-10-PCS | Mod: PBBFAC,,, | Performed by: OBSTETRICS & GYNECOLOGY

## 2022-10-04 NOTE — PROGRESS NOTES
HISTORY OF PRESENT ILLNESS:    Sophie Courtney is a 41 y.o. female,   presents today for her routine visit and has no complaints.      Past Medical History:   Diagnosis Date    Hyperlipidemia     Hypertension        Past Surgical History:   Procedure Laterality Date    ACHILLES TENDON SURGERY Left     hysterecotmy      KNEE ARTHROSCOPY W/ ACL RECONSTRUCTION Right     MYOMECTOMY      Seven uterine fibroids, largest 11 cm in size     MYOMECTOMY      The specimen weighs 779 g and consists of 27 rounded pieces of rubbery tan-pink tissue. The nodules vary from    SALPINGOOPHORECTOMY Right 3/12/2021    Procedure: SALPINGO-OOPHORECTOMY;  Surgeon: Peace Sandoval MD;  Location: [facility];  Service: OB/GYN;  Laterality: Right;    TOTAL ABDOMINAL HYSTERECTOMY N/A 3/12/2021    Procedure: HYSTERECTOMY, TOTAL, ABDOMINAL;  Surgeon: Peace Sandoval MD;  Location: [facility];  Service: OB/GYN;  Laterality: N/A;    WISDOM TOOTH EXTRACTION  2013 &         MEDICATIONS AND ALLERGIES:      Current Outpatient Medications:     amLODIPine (NORVASC) 2.5 MG tablet, Take 1 tablet (2.5 mg total) by mouth once daily., Disp: 90 tablet, Rfl: 1    ascorbic acid, vitamin C, (VITAMIN C) 1000 MG tablet, Take 1,000 mg by mouth once daily., Disp: , Rfl:     atorvastatin (LIPITOR) 20 MG tablet, Take 1 tablet (20 mg total) by mouth once daily., Disp: 90 tablet, Rfl: 1    chlorthalidone (HYGROTEN) 25 MG Tab, Take 1 tablet (25 mg total) by mouth once daily., Disp: 90 tablet, Rfl: 1    ELDERBERRY FRUIT ORAL, Take by mouth., Disp: , Rfl:     ergocalciferol, vitamin D2, (VITAMIN D ORAL), Take by mouth. Per patient, she takes vitamin D 3 tab, not D 2., Disp: , Rfl:     multivitamin capsule, Take 1 capsule by mouth once daily., Disp: , Rfl:     Review of patient's allergies indicates:   Allergen Reactions    Cobalt Hives, Rash and Swelling    Crestor [rosuvastatin] Other (See Comments)     Muscle weakness    Nickel  sutures [surgical stainless steel] Hives    Neosporin [hydrocortisone] Rash       Family History   Problem Relation Age of Onset    Hypertension Father     Hypertension Mother     Stroke Maternal Aunt     Breast cancer Paternal Aunt 60       Social History     Socioeconomic History    Marital status: Single   Tobacco Use    Smoking status: Never    Smokeless tobacco: Never   Substance and Sexual Activity    Alcohol use: No    Drug use: No    Sexual activity: Never       COMPREHENSIVE GYN HISTORY:  PAP History: Denies abnormal Paps.  Infection History: Denies STDs. Denies PID.  Benign History: Denies uterine fibroids. Denies ovarian cysts. Denies endometriosis. Denies other conditions.  Cancer History: Denies cervical cancer. Denies uterine cancer or hyperplasia. Denies ovarian cancer. Denies vulvar cancer or pre-cancer. Denies vaginal cancer or pre-cancer. Denies breast cancer. Denies colon cancer.  Sexual Activity History: Reports currently being sexually active  Menstrual History: Denies menses. Pt is  not on ERT.     ROS:  GENERAL: No weight changes. No swelling. No fatigue. No fever.  CARDIOVASCULAR: No chest pain. No shortness of breath. No leg cramps.   NEUROLOGICAL: No headaches. No vision changes.  BREASTS: No pain. No lumps. No discharge.  ABDOMEN: No pain. No nausea. No vomiting. No diarrhea. No constipation.  REPRODUCTIVE: No abnormal bleeding.  VULVA: No pain. No lesions. No itching.  VAGINA: No relaxation. No itching. No odor. No discharge. No lesions.  URINARY: No incontinence. No nocturia. No frequency. No dysuria.    /83   Wt 78.4 kg (172 lb 13.5 oz)   LMP 03/11/2021 (LMP Unknown)   BMI 28.76 kg/m²     PE:  APPEARANCE: Well nourished, well developed, in no acute distress.  AFFECT: WNL, alert and oriented x 3.  SKIN: No acne or hirsutism.  NECK: Neck symmetric without masses or thyromegaly.  NODES: No inguinal, cervical, axillary or femoral lymph node enlargement.  CHEST: Good respiratory  effort.   ABDOMEN: Soft. No tenderness or masses. No hepatosplenomegaly. No hernias.  BREASTS: Symmetrical, no skin changes or visible lesions. No palpable masses, nipple discharge bilaterally.  PELVIC: ATROPHIC EXTERNAL FEMALE GENITALIA without lesions. Normal hair distribution. Adequate perineal body, normal urethral meatus. VAGINA DRY without lesions or discharge. No significant cystocele or rectocele. Bimanual exam shows CERVIX and UTERUS to be SURGICALLY ABSENT. Adnexa without masses or tenderness.  EXTREMITIES: No edema.    DIAGNOSIS:  1. Visit for screening mammogram    2. Encounter for gynecological examination without abnormal finding    3. Essential hypertension    4. S/P OSMIN (total abdominal hysterectomy)/RSO        Orders Placed This Encounter    Mammo Digital Screening Bilat w/ [facility]       COUNSELING:  The patient was counseled today on  -osteoporosis prevention, calcium supplementation, regular weight bearing exercise.  -ACS PAP guidelines (no paps), with recommendations for yearly pelvic exams as her uterus and cervix were removed for benign reasons and ovaries remain;  -recommendation for yearly mammogram;  -to see her primary care physician for all other health maintenance.    FOLLOW-UP with me for next routine visit.

## 2022-10-24 RX ORDER — VALSARTAN 80 MG/1
80 TABLET ORAL DAILY
Qty: 30 TABLET | Refills: 2 | Status: SHIPPED | OUTPATIENT
Start: 2022-10-24 | End: 2022-11-21

## 2022-10-24 NOTE — TELEPHONE ENCOUNTER
----- Message from Martha Najera sent at 10/24/2022  2:12 PM CDT -----  Pt is having a reaction the bp medicine and she is not sure if she should stay on it. She is having heart fluttering.   [phone]

## 2022-10-24 NOTE — TELEPHONE ENCOUNTER
Spoke to patient. She said it comes and goes for the last few weeks, almost a month. She stopped taking the amlodipine last week because she read it could be a side effect although she's been taking the med since April. Says she hasn't had flutters since she stopped it.      Printed for Dr Pino rob.

## 2022-10-24 NOTE — TELEPHONE ENCOUNTER
The patient's prescription has been approved and sent to   [facility]/pharmacy #[id number] - [place], [place] - [address]  [address] [address]  Phone: [phone] Fax: [phone]

## 2022-10-25 ENCOUNTER — HOSPITAL ENCOUNTER (OUTPATIENT)
Dept: RADIOLOGY | Facility: CLINIC | Age: 42
Discharge: HOME | End: 2022-10-25
Attending: OBSTETRICS & GYNECOLOGY
Payer: COMMERCIAL

## 2022-10-25 DIAGNOSIS — Z12.31 VISIT FOR SCREENING MAMMOGRAM: ICD-10-CM

## 2022-10-25 PROCEDURE — 77067 SCR MAMMO BI INCL CAD: CPT | Mod: 26,,, | Performed by: RADIOLOGY

## 2022-10-25 PROCEDURE — 77067 MAMMO DIGITAL SCREENING BILAT WITH TOMO: ICD-10-PCS | Mod: 26,,, | Performed by: RADIOLOGY

## 2022-10-25 PROCEDURE — 77063 BREAST TOMOSYNTHESIS BI: CPT | Mod: TC,PO

## 2022-10-25 PROCEDURE — 77063 MAMMO DIGITAL SCREENING BILAT WITH TOMO: ICD-10-PCS | Mod: 26,,, | Performed by: RADIOLOGY

## 2022-10-25 PROCEDURE — 77063 BREAST TOMOSYNTHESIS BI: CPT | Mod: 26,,, | Performed by: RADIOLOGY

## 2022-11-30 ENCOUNTER — TELEPHONE (OUTPATIENT)
Dept: FAMILY MEDICINE | Facility: CLINIC | Age: 42
End: 2022-11-30

## 2022-12-14 LAB
ALBUMIN SERPL-MCNC: 4.3 G/DL (ref 3.6–5.1)
ALBUMIN/GLOB SERPL: 1.6 (CALC) (ref 1–2.5)
ALP SERPL-CCNC: 43 U/L (ref 31–125)
ALT SERPL-CCNC: 21 U/L (ref 6–29)
AST SERPL-CCNC: 18 U/L (ref 10–30)
BILIRUB SERPL-MCNC: 0.9 MG/DL (ref 0.2–1.2)
BUN SERPL-MCNC: 18 MG/DL (ref 7–25)
BUN/CREAT SERPL: NORMAL (CALC) (ref 6–22)
CALCIUM SERPL-MCNC: 9.5 MG/DL (ref 8.6–10.2)
CHLORIDE SERPL-SCNC: 99 MMOL/L (ref 98–110)
CHOLEST SERPL-MCNC: 212 MG/DL
CHOLEST/HDLC SERPL: 3.6 (CALC)
CO2 SERPL-SCNC: 30 MMOL/L (ref 20–32)
CREAT SERPL-MCNC: 0.79 MG/DL (ref 0.5–0.99)
EGFR: 95 ML/MIN/1.73M2
GLOBULIN SER CALC-MCNC: 2.7 G/DL (CALC) (ref 1.9–3.7)
GLUCOSE SERPL-MCNC: 85 MG/DL (ref 65–99)
HDLC SERPL-MCNC: 59 MG/DL
LDLC SERPL CALC-MCNC: 141 MG/DL (CALC)
NONHDLC SERPL-MCNC: 153 MG/DL (CALC)
POTASSIUM SERPL-SCNC: 3.5 MMOL/L (ref 3.5–5.3)
PROT SERPL-MCNC: 7 G/DL (ref 6.1–8.1)
SODIUM SERPL-SCNC: 138 MMOL/L (ref 135–146)
TRIGL SERPL-MCNC: 40 MG/DL

## 2023-01-04 ENCOUNTER — OFFICE VISIT (OUTPATIENT)
Dept: FAMILY MEDICINE | Facility: CLINIC | Age: 43
End: 2023-01-04
Payer: COMMERCIAL

## 2023-01-04 VITALS
HEIGHT: 65 IN | HEART RATE: 60 BPM | SYSTOLIC BLOOD PRESSURE: 120 MMHG | DIASTOLIC BLOOD PRESSURE: 72 MMHG | WEIGHT: 167.63 LBS | OXYGEN SATURATION: 99 % | BODY MASS INDEX: 27.93 KG/M2

## 2023-01-04 DIAGNOSIS — E78.2 MIXED HYPERLIPIDEMIA: ICD-10-CM

## 2023-01-04 DIAGNOSIS — Z13.6 SCREENING FOR ISCHEMIC HEART DISEASE (IHD): ICD-10-CM

## 2023-01-04 DIAGNOSIS — Z13.29 SCREENING FOR ENDOCRINE DISORDER: ICD-10-CM

## 2023-01-04 DIAGNOSIS — I10 ESSENTIAL HYPERTENSION: Primary | ICD-10-CM

## 2023-01-04 DIAGNOSIS — Z13.89 SCREENING FOR BLOOD OR PROTEIN IN URINE: ICD-10-CM

## 2023-01-04 DIAGNOSIS — Z79.899 LONG-TERM USE OF HIGH-RISK MEDICATION: ICD-10-CM

## 2023-01-04 PROCEDURE — 99214 OFFICE O/P EST MOD 30 MIN: CPT | Mod: S$GLB,,, | Performed by: FAMILY MEDICINE

## 2023-01-04 PROCEDURE — 99214 PR OFFICE/OUTPT VISIT, EST, LEVL IV, 30-39 MIN: ICD-10-PCS | Mod: S$GLB,,, | Performed by: FAMILY MEDICINE

## 2023-01-04 NOTE — PROGRESS NOTES
SUBJECTIVE:    Patient ID: Sophie Courtney is a 42 y.o. female.    Chief Complaint: 6 month f/u    Pt here to checkup on acute and chronic conditions.    BP is doing today.   Denies CP/SOB/HA.  Has been taking chlorthalindone, norvasc, lipitor. Has lost 5 pounds since last visit.     Had labs done: fBS 85, GFR 95, .  Had not been taking her lipitor.    Telephone on 06/22/2022   Component Date Value Ref Range Status    Glucose 12/13/2022 85  65 - 99 mg/dL Final    Comment:               Fasting reference interval         BUN 12/13/2022 18  7 - 25 mg/dL Final    Creatinine 12/13/2022 0.79  0.50 - 0.99 mg/dL Final    eGFR 12/13/2022 95  > OR = 60 mL/min/1.73m2 Final    Comment: The eGFR is based on the CKD-EPI 2021 equation. To calculate   the new eGFR from a previous Creatinine or Cystatin C  result, go to https://www.kidney.org/professionals/  kdoqi/gfr%5Fcalculator      BUN/Creatinine Ratio 12/13/2022 NOT APPLICABLE  6 - 22 (calc) Final    Sodium 12/13/2022 138  135 - 146 mmol/L Final    Potassium 12/13/2022 3.5  3.5 - 5.3 mmol/L Final    Chloride 12/13/2022 99  98 - 110 mmol/L Final    CO2 12/13/2022 30  20 - 32 mmol/L Final    Calcium 12/13/2022 9.5  8.6 - 10.2 mg/dL Final    Total Protein 12/13/2022 7.0  6.1 - 8.1 g/dL Final    Albumin 12/13/2022 4.3  3.6 - 5.1 g/dL Final    Globulin, Total 12/13/2022 2.7  1.9 - 3.7 g/dL (calc) Final    Albumin/Globulin Ratio 12/13/2022 1.6  1.0 - 2.5 (calc) Final    Total Bilirubin 12/13/2022 0.9  0.2 - 1.2 mg/dL Final    Alkaline Phosphatase 12/13/2022 43  31 - 125 U/L Final    AST 12/13/2022 18  10 - 30 U/L Final    ALT 12/13/2022 21  6 - 29 U/L Final    Cholesterol 12/13/2022 212 (H)  <200 mg/dL Final    HDL 12/13/2022 59  > OR = 50 mg/dL Final    Triglycerides 12/13/2022 40  <150 mg/dL Final    LDL Cholesterol 12/13/2022 141 (H)  mg/dL (calc) Final    Comment: Reference range: <100     Desirable range <100 mg/dL for primary prevention;    <70 mg/dL for  patients with CHD or diabetic patients   with > or = 2 CHD risk factors.     LDL-C is now calculated using the Travis   calculation, which is a validated novel method providing   better accuracy than the Friedewald equation in the   estimation of LDL-C.   Michael HARRELL et al. KEZIA. 2013;310(19): 4991-0229   (http://education.Livelens/faq/KNB333)      HDL/Cholesterol Ratio 12/13/2022 3.6  <5.0 (calc) Final    Non HDL Chol. (LDL+VLDL) 12/13/2022 153 (H)  <130 mg/dL (calc) Final    Comment: For patients with diabetes plus 1 major ASCVD risk   factor, treating to a non-HDL-C goal of <100 mg/dL   (LDL-C of <70 mg/dL) is considered a therapeutic   option.           No visits with results within 6 Month(s) from this visit.   Latest known visit with results is:   Telephone on 06/22/2022   Component Date Value Ref Range Status    Glucose 12/13/2022 85  65 - 99 mg/dL Final    BUN 12/13/2022 18  7 - 25 mg/dL Final    Creatinine 12/13/2022 0.79  0.50 - 0.99 mg/dL Final    eGFR 12/13/2022 95  > OR = 60 mL/min/1.73m2 Final    BUN/Creatinine Ratio 12/13/2022 NOT APPLICABLE  6 - 22 (calc) Final    Sodium 12/13/2022 138  135 - 146 mmol/L Final    Potassium 12/13/2022 3.5  3.5 - 5.3 mmol/L Final    Chloride 12/13/2022 99  98 - 110 mmol/L Final    CO2 12/13/2022 30  20 - 32 mmol/L Final    Calcium 12/13/2022 9.5  8.6 - 10.2 mg/dL Final    Total Protein 12/13/2022 7.0  6.1 - 8.1 g/dL Final    Albumin 12/13/2022 4.3  3.6 - 5.1 g/dL Final    Globulin, Total 12/13/2022 2.7  1.9 - 3.7 g/dL (calc) Final    Albumin/Globulin Ratio 12/13/2022 1.6  1.0 - 2.5 (calc) Final    Total Bilirubin 12/13/2022 0.9  0.2 - 1.2 mg/dL Final    Alkaline Phosphatase 12/13/2022 43  31 - 125 U/L Final    AST 12/13/2022 18  10 - 30 U/L Final    ALT 12/13/2022 21  6 - 29 U/L Final    Cholesterol 12/13/2022 212 (H)  <200 mg/dL Final    HDL 12/13/2022 59  > OR = 50 mg/dL Final    Triglycerides 12/13/2022 40  <150 mg/dL Final    LDL Cholesterol  12/13/2022 141 (H)  mg/dL (calc) Final    HDL/Cholesterol Ratio 12/13/2022 3.6  <5.0 (calc) Final    Non HDL Chol. (LDL+VLDL) 12/13/2022 153 (H)  <130 mg/dL (calc) Final       Past Medical History:   Diagnosis Date    Hyperlipidemia     Hypertension      Social History     Socioeconomic History    Marital status: Single   Tobacco Use    Smoking status: Never    Smokeless tobacco: Never   Substance and Sexual Activity    Alcohol use: No    Drug use: No    Sexual activity: Never     Social Determinants of Health     Financial Resource Strain: Low Risk     Difficulty of Paying Living Expenses: Not very hard   Food Insecurity: No Food Insecurity    Worried About Running Out of Food in the Last Year: Never true    Ran Out of Food in the Last Year: Never true   Transportation Needs: No Transportation Needs    Lack of Transportation (Medical): No    Lack of Transportation (Non-Medical): No   Physical Activity: Sufficiently Active    Days of Exercise per Week: 4 days    Minutes of Exercise per Session: 60 min   Stress: No Stress Concern Present    Feeling of Stress : Only a little   Social Connections: Unknown    Frequency of Communication with Friends and Family: More than three times a week    Frequency of Social Gatherings with Friends and Family: Three times a week    Active Member of Clubs or Organizations: Yes    Attends Club or Organization Meetings: More than 4 times per year    Marital Status: Never    Housing Stability: Low Risk     Unable to Pay for Housing in the Last Year: No    Number of Places Lived in the Last Year: 1    Unstable Housing in the Last Year: No     Past Surgical History:   Procedure Laterality Date    ACHILLES TENDON SURGERY Left 2003    hysterecotmy      KNEE ARTHROSCOPY W/ ACL RECONSTRUCTION Right 1999    MYOMECTOMY  2009    Seven uterine fibroids, largest 11 cm in size     MYOMECTOMY  2014    The specimen weighs 779 g and consists of 27 rounded pieces of rubbery tan-pink tissue. The  nodules vary from    SALPINGOOPHORECTOMY Right 3/12/2021    Procedure: SALPINGO-OOPHORECTOMY;  Surgeon: Peace Sandoval MD;  Location: [facility], [place];  Service: OB/GYN;  Laterality: Right;    TOTAL ABDOMINAL HYSTERECTOMY N/A 3/12/2021    Procedure: HYSTERECTOMY, TOTAL, ABDOMINAL;  Surgeon: Peace Sandoval MD;  Location: [facility], [place] [facility];  Service: OB/GYN;  Laterality: N/A;    WISDOM TOOTH EXTRACTION  2013 2013 & 2015      Family History   Problem Relation Age of Onset    Hypertension Father     Hypertension Mother     Stroke Maternal Aunt     Breast cancer Paternal Aunt 60       Review of patient's allergies indicates:   Allergen Reactions    [place] Hives, Rash and Swelling    Crestor [rosuvastatin] Other (See Comments)     Muscle weakness    Nickel sutures [surgical stainless steel] Hives    Neosporin [hydrocortisone] Rash       Current Outpatient Medications:     valsartan (DIOVAN) 80 MG tablet, Take 1 tablet (80 mg total) by mouth once daily., Disp: 90 tablet, Rfl: 1    ascorbic acid, vitamin C, (VITAMIN C) 1000 MG tablet, Take 1,000 mg by mouth once daily., Disp: , Rfl:     atorvastatin (LIPITOR) 20 MG tablet, Take 1 tablet (20 mg total) by mouth once daily., Disp: 90 tablet, Rfl: 1    chlorthalidone (HYGROTEN) 25 MG Tab, Take 1 tablet (25 mg total) by mouth once daily., Disp: 90 tablet, Rfl: 1    ELDERBERRY FRUIT ORAL, Take by mouth., Disp: , Rfl:     ergocalciferol, vitamin D2, (VITAMIN D ORAL), Take by mouth. Per patient, she takes vitamin D 3 tab, not D 2., Disp: , Rfl:     multivitamin capsule, Take 1 capsule by mouth once daily., Disp: , Rfl:     Review of Systems   Constitutional:  Negative for appetite change, fatigue, fever and unexpected weight change.   Respiratory:  Negative for cough, chest tightness, shortness of breath and wheezing.    Cardiovascular:  Negative for chest pain and leg swelling.   Gastrointestinal:  Negative for abdominal pain, constipation, nausea and vomiting.        -heartburn  "  Genitourinary:  Negative for difficulty urinating, dysuria, frequency, menstrual problem and urgency.   Musculoskeletal:  Negative for arthralgias, back pain, myalgias and neck pain.   Skin:  Negative for rash.   Neurological:  Negative for dizziness, weakness, numbness and headaches.   Hematological:  Does not bruise/bleed easily.   Psychiatric/Behavioral:  Negative for dysphoric mood, sleep disturbance and suicidal ideas. The patient is not nervous/anxious.    All other systems reviewed and are negative.       Objective:      Vitals:    01/04/23 1412   BP: 120/72   Pulse: 60   SpO2: 99%   Weight: 76 kg (167 lb 9.6 oz)   Height: 5' 5" (1.651 m)     Wt Readings from Last 3 Encounters:   01/04/23 76 kg (167 lb 9.6 oz)   10/04/22 78.4 kg (172 lb 13.5 oz)   06/22/22 85.7 kg (189 lb)       Physical Exam  Vitals reviewed.   Constitutional:       General: She is not in acute distress.     Appearance: Normal appearance. She is well-developed. She is obese.   HENT:      Head: Normocephalic and atraumatic.   Neck:      Thyroid: No thyromegaly.   Cardiovascular:      Rate and Rhythm: Normal rate and regular rhythm.      Heart sounds: Normal heart sounds. No murmur heard.    No friction rub.   Pulmonary:      Effort: Pulmonary effort is normal.      Breath sounds: Normal breath sounds. No wheezing or rales.   Abdominal:      General: Bowel sounds are normal. There is no distension.      Palpations: Abdomen is soft.      Tenderness: There is no abdominal tenderness.   Musculoskeletal:      Cervical back: Neck supple.   Lymphadenopathy:      Cervical: No cervical adenopathy.   Skin:     General: Skin is warm and dry.      Findings: No rash.   Neurological:      Mental Status: She is alert and oriented to person, place, and time.   Psychiatric:         Attention and Perception: She is attentive.         Speech: Speech normal.         Behavior: Behavior normal.         Thought Content: Thought content normal.         Judgment: " Judgment normal.         Assessment:       1. Essential hypertension    2. Mixed hyperlipidemia    3. Screening for blood or protein in urine    4. Screening for ischemic heart disease (IHD)    5. Long-term use of high-risk medication    6. Screening for endocrine disorder         Plan:       Essential hypertension  Comments:  Controlled. Will continue to monitor BP on current medication regimen    Mixed hyperlipidemia  Comments:  Suboptimal. . To resume lipitor    Screening for blood or protein in urine  -     Microalbumin/Creatinine Ratio, Urine; Future; Expected date: 01/04/2023  -     Urinalysis, Reflex to Urine Culture Urine, Clean Catch; Future; Expected date: 01/04/2023    Screening for ischemic heart disease (IHD)  -     Comprehensive Metabolic Panel; Future; Expected date: 01/04/2023  -     Lipid Panel; Future; Expected date: 01/04/2023    Long-term use of high-risk medication  -     Comprehensive Metabolic Panel; Future; Expected date: 01/04/2023  -     Lipid Panel; Future; Expected date: 01/04/2023  -     Microalbumin/Creatinine Ratio, Urine; Future; Expected date: 01/04/2023  -     TSH w/reflex to FT4; Future; Expected date: 01/04/2023  -     Urinalysis, Reflex to Urine Culture Urine, Clean Catch; Future; Expected date: 01/04/2023  -     CBC Auto Differential; Future; Expected date: 01/04/2023    Screening for endocrine disorder  -     TSH w/reflex to FT4; Future; Expected date: 01/04/2023      Other  Lab results discussed and reviewed with patient.  Labs and/or tests have been ordered for the evaluation/monitoring of acute/chronic conditions, to be done just before next visit.    Follow up in about 6 months (around 7/4/2023) for HTN, HLD, LABS.        1/4/2023 Rohan Pringle

## 2023-02-13 ENCOUNTER — PATIENT MESSAGE (OUTPATIENT)
Dept: OBSTETRICS AND GYNECOLOGY | Facility: CLINIC | Age: 43
End: 2023-02-13
Payer: COMMERCIAL

## 2023-02-13 RX ORDER — FLUCONAZOLE 200 MG/1
TABLET ORAL
Qty: 2 TABLET | Refills: 0 | Status: CANCELLED | OUTPATIENT
Start: 2023-02-13

## 2023-02-22 RX ORDER — FLUCONAZOLE 150 MG/1
150 TABLET ORAL ONCE
Qty: 1 TABLET | Refills: 0 | Status: SHIPPED | OUTPATIENT
Start: 2023-02-22 | End: 2023-02-22

## 2023-04-25 DIAGNOSIS — I10 ESSENTIAL HYPERTENSION: ICD-10-CM

## 2023-04-25 RX ORDER — CHLORTHALIDONE 25 MG/1
25 TABLET ORAL DAILY
Qty: 90 TABLET | Refills: 1 | Status: SHIPPED | OUTPATIENT
Start: 2023-04-25 | End: 2024-01-13 | Stop reason: SDUPTHER

## 2023-04-25 NOTE — TELEPHONE ENCOUNTER
----- Message from India Frank sent at 4/25/2023  9:24 AM CDT -----  Patient called and stated that she need a refill of her chlorthalidone called into [facility] on [address]. If any questions please give her a call at [phone]

## 2023-04-25 NOTE — TELEPHONE ENCOUNTER
The patient's prescription has been approved and sent to   [facility]/pharmacy #[id number] - [place], [place] - [id number]  [id number] [address]  Phone: [phone] Fax: [phone]

## 2023-06-15 ENCOUNTER — TELEPHONE (OUTPATIENT)
Dept: FAMILY MEDICINE | Facility: CLINIC | Age: 43
End: 2023-06-15

## 2023-07-04 LAB
ALBUMIN SERPL-MCNC: 4.3 G/DL (ref 3.6–5.1)
ALBUMIN/GLOB SERPL: 1.7 (CALC) (ref 1–2.5)
ALP SERPL-CCNC: 34 U/L (ref 31–125)
ALT SERPL-CCNC: 19 U/L (ref 6–29)
AST SERPL-CCNC: 15 U/L (ref 10–30)
BASOPHILS # BLD AUTO: 20 CELLS/UL (ref 0–200)
BASOPHILS NFR BLD AUTO: 0.5 %
BILIRUB SERPL-MCNC: 1 MG/DL (ref 0.2–1.2)
BUN SERPL-MCNC: 17 MG/DL (ref 7–25)
BUN/CREAT SERPL: NORMAL (CALC) (ref 6–22)
CALCIUM SERPL-MCNC: 9.2 MG/DL (ref 8.6–10.2)
CHLORIDE SERPL-SCNC: 103 MMOL/L (ref 98–110)
CHOLEST SERPL-MCNC: 240 MG/DL
CHOLEST/HDLC SERPL: 3.9 (CALC)
CO2 SERPL-SCNC: 28 MMOL/L (ref 20–32)
CREAT SERPL-MCNC: 0.73 MG/DL (ref 0.5–0.99)
EGFR: 104 ML/MIN/1.73M2
EOSINOPHIL # BLD AUTO: 31 CELLS/UL (ref 15–500)
EOSINOPHIL NFR BLD AUTO: 0.8 %
ERYTHROCYTE [DISTWIDTH] IN BLOOD BY AUTOMATED COUNT: 12.4 % (ref 11–15)
GLOBULIN SER CALC-MCNC: 2.6 G/DL (CALC) (ref 1.9–3.7)
GLUCOSE SERPL-MCNC: 80 MG/DL (ref 65–99)
HCT VFR BLD AUTO: 38.4 % (ref 35–45)
HDLC SERPL-MCNC: 61 MG/DL
HGB BLD-MCNC: 12.8 G/DL (ref 11.7–15.5)
LDLC SERPL CALC-MCNC: 167 MG/DL (CALC)
LYMPHOCYTES # BLD AUTO: 1743 CELLS/UL (ref 850–3900)
LYMPHOCYTES NFR BLD AUTO: 44.7 %
MCH RBC QN AUTO: 31.4 PG (ref 27–33)
MCHC RBC AUTO-ENTMCNC: 33.3 G/DL (ref 32–36)
MCV RBC AUTO: 94.1 FL (ref 80–100)
MONOCYTES # BLD AUTO: 273 CELLS/UL (ref 200–950)
MONOCYTES NFR BLD AUTO: 7 %
NEUTROPHILS # BLD AUTO: 1833 CELLS/UL (ref 1500–7800)
NEUTROPHILS NFR BLD AUTO: 47 %
NONHDLC SERPL-MCNC: 179 MG/DL (CALC)
PLATELET # BLD AUTO: 268 THOUSAND/UL (ref 140–400)
PMV BLD REES-ECKER: 10.7 FL (ref 7.5–12.5)
POTASSIUM SERPL-SCNC: 3.6 MMOL/L (ref 3.5–5.3)
PROT SERPL-MCNC: 6.9 G/DL (ref 6.1–8.1)
RBC # BLD AUTO: 4.08 MILLION/UL (ref 3.8–5.1)
SODIUM SERPL-SCNC: 139 MMOL/L (ref 135–146)
TRIGL SERPL-MCNC: 41 MG/DL
TSH SERPL-ACNC: 1.38 MIU/L
WBC # BLD AUTO: 3.9 THOUSAND/UL (ref 3.8–10.8)

## 2023-07-05 ENCOUNTER — OFFICE VISIT (OUTPATIENT)
Dept: FAMILY MEDICINE | Facility: CLINIC | Age: 43
End: 2023-07-05
Payer: COMMERCIAL

## 2023-07-05 VITALS
BODY MASS INDEX: 29.19 KG/M2 | WEIGHT: 171 LBS | SYSTOLIC BLOOD PRESSURE: 108 MMHG | OXYGEN SATURATION: 99 % | DIASTOLIC BLOOD PRESSURE: 70 MMHG | HEIGHT: 64 IN | HEART RATE: 53 BPM

## 2023-07-05 DIAGNOSIS — M79.642 PAIN OF LEFT HAND: ICD-10-CM

## 2023-07-05 DIAGNOSIS — Z79.899 LONG-TERM USE OF HIGH-RISK MEDICATION: ICD-10-CM

## 2023-07-05 DIAGNOSIS — I10 ESSENTIAL HYPERTENSION: Primary | ICD-10-CM

## 2023-07-05 DIAGNOSIS — E78.2 MIXED HYPERLIPIDEMIA: ICD-10-CM

## 2023-07-05 DIAGNOSIS — Z76.0 MEDICATION REFILL: ICD-10-CM

## 2023-07-05 PROCEDURE — 99214 PR OFFICE/OUTPT VISIT, EST, LEVL IV, 30-39 MIN: ICD-10-PCS | Mod: S$GLB,,, | Performed by: FAMILY MEDICINE

## 2023-07-05 PROCEDURE — 99214 OFFICE O/P EST MOD 30 MIN: CPT | Mod: S$GLB,,, | Performed by: FAMILY MEDICINE

## 2023-07-05 RX ORDER — VALSARTAN 80 MG/1
80 TABLET ORAL DAILY
Qty: 90 TABLET | Refills: 1 | Status: SHIPPED | OUTPATIENT
Start: 2023-07-05

## 2023-07-05 RX ORDER — ATORVASTATIN CALCIUM 20 MG/1
20 TABLET, FILM COATED ORAL DAILY
Qty: 90 TABLET | Refills: 1 | Status: SHIPPED | OUTPATIENT
Start: 2023-07-05

## 2023-07-05 NOTE — PROGRESS NOTES
SUBJECTIVE:    Patient ID: Sophie Courtney is a 42 y.o. female.    Chief Complaint: Follow-up (6 mo follow up/ discuss lab work in Epic/ fell in April hurt left pinky unable to make a full fist//mp)    Pt here to checkup on acute and chronic conditions.    BP is doing today.   Denies CP/SOB/HA.  Has been taking chlorthalindone, diovan. Has been exercising.     Had labs done: fBS 80, , .  Had not been taking her lipitor.  Her mom and dad has had a Stroke/TIA.    Pt fell in April and hurt her left 5th finger. Unable to make a full fist. If she does something strenuous, she will still have problem.    ---------------------------------------------------------------------------------------------------    Office Visit on 01/04/2023   Component Date Value Ref Range Status    Glucose 07/03/2023 80  65 - 99 mg/dL Final    Comment:               Fasting reference interval         BUN 07/03/2023 17  7 - 25 mg/dL Final    Creatinine 07/03/2023 0.73  0.50 - 0.99 mg/dL Final    eGFR 07/03/2023 104  > OR = 60 mL/min/1.73m2 Final    Comment: The eGFR is based on the CKD-EPI 2021 equation. To calculate   the new eGFR from a previous Creatinine or Cystatin C  result, go to https://www.kidney.org/professionals/  kdoqi/gfr%5Fcalculator      BUN/Creatinine Ratio 07/03/2023 NOT APPLICABLE  6 - 22 (calc) Final    Sodium 07/03/2023 139  135 - 146 mmol/L Final    Potassium 07/03/2023 3.6  3.5 - 5.3 mmol/L Final    Chloride 07/03/2023 103  98 - 110 mmol/L Final    CO2 07/03/2023 28  20 - 32 mmol/L Final    Calcium 07/03/2023 9.2  8.6 - 10.2 mg/dL Final    Total Protein 07/03/2023 6.9  6.1 - 8.1 g/dL Final    Albumin 07/03/2023 4.3  3.6 - 5.1 g/dL Final    Globulin, Total 07/03/2023 2.6  1.9 - 3.7 g/dL (calc) Final    Albumin/Globulin Ratio 07/03/2023 1.7  1.0 - 2.5 (calc) Final    Total Bilirubin 07/03/2023 1.0  0.2 - 1.2 mg/dL Final    Alkaline Phosphatase 07/03/2023 34  31 - 125 U/L Final    AST 07/03/2023 15  10 - 30  U/L Final    ALT 07/03/2023 19  6 - 29 U/L Final    Cholesterol 07/03/2023 240 (H)  <200 mg/dL Final    HDL 07/03/2023 61  > OR = 50 mg/dL Final    Triglycerides 07/03/2023 41  <150 mg/dL Final    LDL Cholesterol 07/03/2023 167 (H)  mg/dL (calc) Final    Comment: Reference range: <100     Desirable range <100 mg/dL for primary prevention;    <70 mg/dL for patients with CHD or diabetic patients   with > or = 2 CHD risk factors.     LDL-C is now calculated using the Travis   calculation, which is a validated novel method providing   better accuracy than the Friedewald equation in the   estimation of LDL-C.   Michael SS et al. KEZIA. 2013;310(19): 2841-5701   (http://education.Netatmo/faq/WDR994)      HDL/Cholesterol Ratio 07/03/2023 3.9  <5.0 (calc) Final    Non HDL Chol. (LDL+VLDL) 07/03/2023 179 (H)  <130 mg/dL (calc) Final    Comment: For patients with diabetes plus 1 major ASCVD risk   factor, treating to a non-HDL-C goal of <100 mg/dL   (LDL-C of <70 mg/dL) is considered a therapeutic   option.      TSH w/reflex to FT4 07/03/2023 1.38  mIU/L Final    Comment:           Reference Range                         > or = 20 Years  0.40-4.50                              Pregnancy Ranges            First trimester    0.26-2.66            Second trimester   0.55-2.73            Third trimester    0.43-2.91      WBC 07/03/2023 3.9  3.8 - 10.8 Thousand/uL Final    RBC 07/03/2023 4.08  3.80 - 5.10 Million/uL Final    Hemoglobin 07/03/2023 12.8  11.7 - 15.5 g/dL Final    Hematocrit 07/03/2023 38.4  35.0 - 45.0 % Final    MCV 07/03/2023 94.1  80.0 - 100.0 fL Final    MCH 07/03/2023 31.4  27.0 - 33.0 pg Final    MCHC 07/03/2023 33.3  32.0 - 36.0 g/dL Final    RDW 07/03/2023 12.4  11.0 - 15.0 % Final    Platelets 07/03/2023 268  140 - 400 Thousand/uL Final    MPV 07/03/2023 10.7  7.5 - 12.5 fL Final    Neutrophils, Abs 07/03/2023 1,833  1,500 - 7,800 cells/uL Final    Lymph # 07/03/2023 1,743  850 - 3,900  cells/uL Final    Mono # 07/03/2023 273  200 - 950 cells/uL Final    Eos # 07/03/2023 31  15 - 500 cells/uL Final    Baso # 07/03/2023 20  0 - 200 cells/uL Final    Neutrophils Relative 07/03/2023 47  % Final    Lymph % 07/03/2023 44.7  % Final    Mono % 07/03/2023 7.0  % Final    Eosinophil % 07/03/2023 0.8  % Final    Basophil % 07/03/2023 0.5  % Final         No visits with results within 6 Month(s) from this visit.   Latest known visit with results is:   Office Visit on 01/04/2023   Component Date Value Ref Range Status    Glucose 07/03/2023 80  65 - 99 mg/dL Final    BUN 07/03/2023 17  7 - 25 mg/dL Final    Creatinine 07/03/2023 0.73  0.50 - 0.99 mg/dL Final    eGFR 07/03/2023 104  > OR = 60 mL/min/1.73m2 Final    BUN/Creatinine Ratio 07/03/2023 NOT APPLICABLE  6 - 22 (calc) Final    Sodium 07/03/2023 139  135 - 146 mmol/L Final    Potassium 07/03/2023 3.6  3.5 - 5.3 mmol/L Final    Chloride 07/03/2023 103  98 - 110 mmol/L Final    CO2 07/03/2023 28  20 - 32 mmol/L Final    Calcium 07/03/2023 9.2  8.6 - 10.2 mg/dL Final    Total Protein 07/03/2023 6.9  6.1 - 8.1 g/dL Final    Albumin 07/03/2023 4.3  3.6 - 5.1 g/dL Final    Globulin, Total 07/03/2023 2.6  1.9 - 3.7 g/dL (calc) Final    Albumin/Globulin Ratio 07/03/2023 1.7  1.0 - 2.5 (calc) Final    Total Bilirubin 07/03/2023 1.0  0.2 - 1.2 mg/dL Final    Alkaline Phosphatase 07/03/2023 34  31 - 125 U/L Final    AST 07/03/2023 15  10 - 30 U/L Final    ALT 07/03/2023 19  6 - 29 U/L Final    Cholesterol 07/03/2023 240 (H)  <200 mg/dL Final    HDL 07/03/2023 61  > OR = 50 mg/dL Final    Triglycerides 07/03/2023 41  <150 mg/dL Final    LDL Cholesterol 07/03/2023 167 (H)  mg/dL (calc) Final    HDL/Cholesterol Ratio 07/03/2023 3.9  <5.0 (calc) Final    Non HDL Chol. (LDL+VLDL) 07/03/2023 179 (H)  <130 mg/dL (calc) Final    TSH w/reflex to FT4 07/03/2023 1.38  mIU/L Final    WBC 07/03/2023 3.9  3.8 - 10.8 Thousand/uL Final    RBC 07/03/2023 4.08  3.80 - 5.10 Million/uL  Final    Hemoglobin 07/03/2023 12.8  11.7 - 15.5 g/dL Final    Hematocrit 07/03/2023 38.4  35.0 - 45.0 % Final    MCV 07/03/2023 94.1  80.0 - 100.0 fL Final    MCH 07/03/2023 31.4  27.0 - 33.0 pg Final    MCHC 07/03/2023 33.3  32.0 - 36.0 g/dL Final    RDW 07/03/2023 12.4  11.0 - 15.0 % Final    Platelets 07/03/2023 268  140 - 400 Thousand/uL Final    MPV 07/03/2023 10.7  7.5 - 12.5 fL Final    Neutrophils, Abs 07/03/2023 1,833  1,500 - 7,800 cells/uL Final    Lymph # 07/03/2023 1,743  850 - 3,900 cells/uL Final    Mono # 07/03/2023 273  200 - 950 cells/uL Final    Eos # 07/03/2023 31  15 - 500 cells/uL Final    Baso # 07/03/2023 20  0 - 200 cells/uL Final    Neutrophils Relative 07/03/2023 47  % Final    Lymph % 07/03/2023 44.7  % Final    Mono % 07/03/2023 7.0  % Final    Eosinophil % 07/03/2023 0.8  % Final    Basophil % 07/03/2023 0.5  % Final       Past Medical History:   Diagnosis Date    Hyperlipidemia     Hypertension      Social History     Socioeconomic History    Marital status: Single   Tobacco Use    Smoking status: Never    Smokeless tobacco: Never   Substance and Sexual Activity    Alcohol use: No    Drug use: No    Sexual activity: Never     Social Determinants of Health     Financial Resource Strain: Low Risk     Difficulty of Paying Living Expenses: Not very hard   Food Insecurity: No Food Insecurity    Worried About Running Out of Food in the Last Year: Never true    Ran Out of Food in the Last Year: Never true   Transportation Needs: No Transportation Needs    Lack of Transportation (Medical): No    Lack of Transportation (Non-Medical): No   Physical Activity: Sufficiently Active    Days of Exercise per Week: 4 days    Minutes of Exercise per Session: 60 min   Stress: No Stress Concern Present    Feeling of Stress : Not at all   Social Connections: Unknown    Frequency of Communication with Friends and Family: More than three times a week    Frequency of Social Gatherings with Friends and Family:  Once a week    Active Member of Clubs or Organizations: Yes    Attends Club or Organization Meetings: More than 4 times per year    Marital Status: Never    Housing Stability: Low Risk     Unable to Pay for Housing in the Last Year: No    Number of Places Lived in the Last Year: 1    Unstable Housing in the Last Year: No     Past Surgical History:   Procedure Laterality Date    ACHILLES TENDON SURGERY Left 2003    hysterecotmy      KNEE ARTHROSCOPY W/ ACL RECONSTRUCTION Right 1999    MYOMECTOMY  2009    Seven uterine fibroids, largest 11 cm in size     MYOMECTOMY  2014    The specimen weighs 779 g and consists of 27 rounded pieces of rubbery tan-pink tissue. The nodules vary from    SALPINGOOPHORECTOMY Right 3/12/2021    Procedure: SALPINGO-OOPHORECTOMY;  Surgeon: Peace Sandoval MD;  Location: [facility] OR;  Service: OB/GYN;  Laterality: Right;    TOTAL ABDOMINAL HYSTERECTOMY N/A 3/12/2021    Procedure: HYSTERECTOMY, TOTAL, ABDOMINAL;  Surgeon: Peace Sandoval MD;  Location: [facility] OR;  Service: OB/GYN;  Laterality: N/A;    WISDOM TOOTH EXTRACTION  2013 2013 & 2015      Family History   Problem Relation Age of Onset    Hypertension Father     Hypertension Mother     Stroke Maternal Aunt     Breast cancer Paternal Aunt 60       Review of patient's allergies indicates:   Allergen Reactions    C[place]alt Hives, Rash and Swelling    Crestor [rosuvastatin] Other (See Comments)     Muscle weakness    Nickel sutures [surgical stainless steel] Hives    Neosporin [hydrocortisone] Rash       Current Outpatient Medications:     ascorbic acid, vitamin C, (VITAMIN C) 1000 MG tablet, Take 1,000 mg by mouth once daily., Disp: , Rfl:     chlorthalidone (HYGROTEN) 25 MG Tab, Take 1 tablet (25 mg total) by mouth once daily., Disp: 90 tablet, Rfl: 1    ELDERBERRY FRUIT ORAL, Take by mouth., Disp: , Rfl:     multivitamin capsule, Take 1 capsule by mouth once daily., Disp: , Rfl:     atorvastatin (LIPITOR) 20 MG tablet, Take 1  "tablet (20 mg total) by mouth once daily., Disp: 90 tablet, Rfl: 1    ergocalciferol, vitamin D2, (VITAMIN D ORAL), Take by mouth. Per patient, she takes vitamin D 3 tab, not D 2., Disp: , Rfl:     valsartan (DIOVAN) 80 MG tablet, Take 1 tablet (80 mg total) by mouth once daily., Disp: 90 tablet, Rfl: 1    Review of Systems   Constitutional:  Negative for appetite change, fatigue, fever and unexpected weight change.   Respiratory:  Negative for cough, chest tightness, shortness of breath and wheezing.    Cardiovascular:  Negative for chest pain and leg swelling.   Gastrointestinal:  Negative for abdominal pain, constipation, nausea and vomiting.        -heartburn   Genitourinary:  Negative for difficulty urinating, dysuria, frequency, menstrual problem and urgency.   Musculoskeletal:  Negative for arthralgias, back pain, myalgias and neck pain.   Skin:  Negative for rash.   Neurological:  Negative for dizziness, weakness, numbness and headaches.   Hematological:  Does not bruise/bleed easily.   Psychiatric/Behavioral:  Negative for dysphoric mood, sleep disturbance and suicidal ideas. The patient is not nervous/anxious.    All other systems reviewed and are negative.       Objective:      Vitals:    07/05/23 1106   BP: 108/70   Pulse: (!) 53   SpO2: 99%   Weight: 77.6 kg (171 lb)   Height: 5' 4" (1.626 m)     Wt Readings from Last 6 Encounters:   07/05/23 77.6 kg (171 lb)   01/04/23 76 kg (167 lb 9.6 oz)   10/04/22 78.4 kg (172 lb 13.5 oz)   06/22/22 85.7 kg (189 lb)   04/26/22 91 kg (200 lb 9.9 oz)   04/21/22 90.3 kg (199 lb)         Physical Exam  Vitals reviewed.   Constitutional:       General: She is not in acute distress.     Appearance: Normal appearance. She is well-developed. She is obese.   HENT:      Head: Normocephalic and atraumatic.   Neck:      Thyroid: No thyromegaly.   Cardiovascular:      Rate and Rhythm: Normal rate and regular rhythm.      Heart sounds: Normal heart sounds. No murmur heard.    No " friction rub.   Pulmonary:      Effort: Pulmonary effort is normal.      Breath sounds: Normal breath sounds. No wheezing or rales.   Abdominal:      General: Bowel sounds are normal. There is no distension.      Palpations: Abdomen is soft.      Tenderness: There is no abdominal tenderness.   Musculoskeletal:      Right hand: No bony tenderness. Decreased range of motion (5th finger flexion).      Cervical back: Neck supple.   Lymphadenopathy:      Cervical: No cervical adenopathy.   Skin:     General: Skin is warm and dry.      Findings: No rash.   Neurological:      Mental Status: She is alert and oriented to person, place, and time.   Psychiatric:         Attention and Perception: She is attentive.         Speech: Speech normal.         Behavior: Behavior normal.         Thought Content: Thought content normal.         Judgment: Judgment normal.         Assessment:       1. Essential hypertension    2. Mixed hyperlipidemia    3. Pain of left hand    4. Medication refill    5. Long-term use of high-risk medication         Plan:       Essential hypertension  Comments:  Controlled. Will continue to monitor BP on current medication. Pt advised to decrease chlorthalindone if sx of dizziness or more weight loss    Mixed hyperlipidemia  Comments:  Suboptimal. . Pt abvised to resume lipitor  Orders:  -     Comprehensive Metabolic Panel; Future; Expected date: 07/05/2023  -     Lipid Panel; Future; Expected date: 07/05/2023    Pain of left hand  Comments:  Improved. Will get Xray of hand.  Orders:  -     X-Ray Hand Complete Left; Future; Expected date: 07/05/2023    Medication refill  -     atorvastatin (LIPITOR) 20 MG tablet; Take 1 tablet (20 mg total) by mouth once daily.  Dispense: 90 tablet; Refill: 1  -     valsartan (DIOVAN) 80 MG tablet; Take 1 tablet (80 mg total) by mouth once daily.  Dispense: 90 tablet; Refill: 1    Long-term use of high-risk medication  -     Comprehensive Metabolic Panel; Future;  Expected date: 07/05/2023  -     Lipid Panel; Future; Expected date: 07/05/2023      Other  Lab results discussed and reviewed with patient.  Labs and/or tests have been ordered for the evaluation/monitoring of acute/chronic conditions, to be done just before next visit.    Follow up in about 6 months (around 1/5/2024) for HTN, HLD, LABS.        7/5/2023 Rohan Pringle

## 2023-07-06 LAB
ALBUMIN/CREAT UR: 4 MCG/MG CREAT
APPEARANCE UR: CLEAR
BACTERIA #/AREA URNS HPF: NORMAL /HPF
BACTERIA UR CULT: NORMAL
BILIRUB UR QL STRIP: NEGATIVE
COLOR UR: YELLOW
CREAT UR-MCNC: 107 MG/DL (ref 20–275)
GLUCOSE UR QL STRIP: NEGATIVE
HGB UR QL STRIP: NEGATIVE
HYALINE CASTS #/AREA URNS LPF: NORMAL /LPF
KETONES UR QL STRIP: NEGATIVE
LEUKOCYTE ESTERASE UR QL STRIP: NEGATIVE
MICROALBUMIN UR-MCNC: 0.4 MG/DL
NITRITE UR QL STRIP: NEGATIVE
PH UR STRIP: NORMAL [PH] (ref 5–8)
PROT UR QL STRIP: NEGATIVE
RBC #/AREA URNS HPF: NORMAL /HPF
SERVICE CMNT-IMP: NORMAL
SP GR UR STRIP: 1.02 (ref 1–1.03)
SQUAMOUS #/AREA URNS HPF: NORMAL /HPF
WBC #/AREA URNS HPF: NORMAL /HPF

## 2023-08-24 ENCOUNTER — PATIENT MESSAGE (OUTPATIENT)
Dept: ADMINISTRATIVE | Facility: HOSPITAL | Age: 43
End: 2023-08-24
Payer: COMMERCIAL

## 2023-08-25 ENCOUNTER — PATIENT OUTREACH (OUTPATIENT)
Dept: ADMINISTRATIVE | Facility: HOSPITAL | Age: 43
End: 2023-08-25
Payer: COMMERCIAL

## 2023-08-25 ENCOUNTER — PATIENT MESSAGE (OUTPATIENT)
Dept: ADMINISTRATIVE | Facility: HOSPITAL | Age: 43
End: 2023-08-25
Payer: COMMERCIAL

## 2023-08-25 DIAGNOSIS — Z12.31 ENCOUNTER FOR SCREENING MAMMOGRAM FOR BREAST CANCER: Primary | ICD-10-CM

## 2023-08-25 NOTE — PROGRESS NOTES
Population Health Chart Review & Patient Outreach Details:     Reason for Outreach Encounter:     [x]  Non-Compliant Report   []  Payor Report ([employer], PHN, BCBS, MSSP, MCIP, UHC, etc.)   []  Pre-Visit Chart Review     Updates Requested / Reviewed:     []  Care Everywhere    []     []  External Sources ([facility], [facility], DIS, etc.)   [x]  Care Team Updated    Patient Outreach Method:    []  Telephone Outreach Completed   [] Successful   [] Left Voicemail   [] Unable to Contact (wrong number, no voicemail)  [x]  [employer] Portal Outreach Sent  []  Letter Outreach Mailed  []  Fax Sent for External Records  []  External Records Upload    Health Maintenance Topics Addressed and Outreach Outcomes / Actions Taken:        [x]      Breast Cancer Screening []  Mammo Scheduled      []  External Records Requested     []  Added Reminder to Complete to Upcoming Primary Care Appt Notes     []  Patient Declined     []  Patient Will Call Back to Schedule     []  Patient Will Schedule with External Provider / Order Routed if Applicable             []       Cervical Cancer Screening []  Pap Scheduled      []  External Records Requested     []  Added Reminder to Complete to Upcoming Primary Care Appt Notes     []  Patient Declined     []  Patient Will Call Back to Schedule     []  Patient Will Schedule with External Provider               []          Colorectal Cancer Screening []  Colonoscopy Case Request or Referral Placed     []  External Records Requested     []  Added Reminder to Complete to Upcoming Primary Care Appt Notes     []  Patient Declined     []  Patient Will Call Back to Schedule     []  Patient Will Schedule with External Provider     []  Fit Kit Mailed (add the SmartPhrase under additional notes)     []  Reminded Patient to Complete Home Test             []      Diabetic Eye Exam []  Eye Camera Scheduled or Optometry Referral Placed     []  External Records Requested     []  Added Reminder to Complete to  Upcoming Primary Care Appt Notes     []  Patient Declined     []  Patient Will Call Back to Schedule     []  Patient Will Schedule with External Provider             []      Blood Pressure Control []  Primary Care Follow Up Visit Scheduled     []  Remote Blood Pressure Reading Captured     []  Added Reminder to Complete to Upcoming Primary Care Appt Notes     []  Patient Declined     []  Patient Will Call Back / Patient Will Send Portal Message with Reading     []  Patient Will Call Back to Schedule Provider Visit             []       HbA1c & Other Labs []  Lab Appt Scheduled for Due Labs     []  Primary Care Follow Up Visit Scheduled      []  Reminded Patient to Complete Home Test     []  Added Reminder to Complete to Upcoming Primary Care Appt Notes     []  Patient Declined     []  Patient Will Call Back to Schedule     []  Patient Will Schedule with External Provider / Order Routed if Applicable           []    Schedule Primary Care Appt []  Primary Care Appt Scheduled     []  Patient Declined     []  Patient Will Call Back to Schedule     []  Pt Established with External Provider & Updated Care Team             []      Medication Adherence []  Primary Care Appointment Scheduled     []  Added Reminder to Upcoming Primary Care Appt Notes     []  Patient Reminded to  Prescription     []  Patient Declined, Provider Notified if Needed     []  Sent Provider Message to Review and/or Add Exclusion to Problem List             []      Osteoporosis Screening []  DXA Appointment Scheduled     []  External Records Requested     []  Added Reminder to Complete to Upcoming Primary Care Appt Notes     []  Patient Declined     []  Patient Will Call Back to Schedule     []  Patient Will Schedule with External Provider / Order Routed if Applicable     Additional Care Coordinator Notes:     WOG Placed For Mammogram. Task Appointment Sent.    Further Action Needed If Patient Returns Outreach:

## 2023-09-01 ENCOUNTER — PATIENT MESSAGE (OUTPATIENT)
Dept: OBSTETRICS AND GYNECOLOGY | Facility: CLINIC | Age: 43
End: 2023-09-01
Payer: COMMERCIAL

## 2023-09-07 RX ORDER — FLUCONAZOLE 150 MG/1
150 TABLET ORAL ONCE
Qty: 1 TABLET | Refills: 0 | Status: SHIPPED | OUTPATIENT
Start: 2023-09-07 | End: 2023-09-07

## 2023-10-03 ENCOUNTER — TELEPHONE (OUTPATIENT)
Dept: FAMILY MEDICINE | Facility: CLINIC | Age: 43
End: 2023-10-03

## 2023-10-03 NOTE — TELEPHONE ENCOUNTER
----- Message from Alea Gloria sent at 10/3/2023  2:35 PM CDT -----  Contact: Self  Pt is calling in regards to her Mammo scheduled for 10/27 and is looking to have this done Monday 10/09, since she will be switching insurance companies in the next few weeks and needs to have this completed while she still has coverage. If we can please call pt back to advise at [phone]. Thank You

## 2023-10-03 NOTE — TELEPHONE ENCOUNTER
Spoke with patient advised that her mammogram is due after 10/22/23, insurance will not cover before that date.  Patient verbalized understanding and will reschedule when her insurance takes effects.

## 2023-10-26 ENCOUNTER — HOSPITAL ENCOUNTER (OUTPATIENT)
Dept: RADIOLOGY | Facility: CLINIC | Age: 43
Discharge: HOME | End: 2023-10-26
Attending: FAMILY MEDICINE
Payer: COMMERCIAL

## 2023-10-26 DIAGNOSIS — Z12.31 ENCOUNTER FOR SCREENING MAMMOGRAM FOR BREAST CANCER: ICD-10-CM

## 2023-10-26 PROCEDURE — 77067 SCR MAMMO BI INCL CAD: CPT | Mod: TC,PO

## 2023-10-26 PROCEDURE — 77063 MAMMO DIGITAL SCREENING BILAT WITH TOMO: ICD-10-PCS | Mod: 26,,, | Performed by: RADIOLOGY

## 2023-10-26 PROCEDURE — 77067 SCR MAMMO BI INCL CAD: CPT | Mod: 26,,, | Performed by: RADIOLOGY

## 2023-10-26 PROCEDURE — 77063 BREAST TOMOSYNTHESIS BI: CPT | Mod: 26,,, | Performed by: RADIOLOGY

## 2023-10-26 PROCEDURE — 77067 MAMMO DIGITAL SCREENING BILAT WITH TOMO: ICD-10-PCS | Mod: 26,,, | Performed by: RADIOLOGY

## 2023-12-19 ENCOUNTER — OFFICE VISIT (OUTPATIENT)
Dept: OBSTETRICS AND GYNECOLOGY | Facility: CLINIC | Age: 43
End: 2023-12-19
Payer: COMMERCIAL

## 2023-12-19 VITALS
DIASTOLIC BLOOD PRESSURE: 69 MMHG | SYSTOLIC BLOOD PRESSURE: 116 MMHG | HEIGHT: 64 IN | BODY MASS INDEX: 29.13 KG/M2 | WEIGHT: 170.63 LBS

## 2023-12-19 DIAGNOSIS — I10 ESSENTIAL HYPERTENSION: ICD-10-CM

## 2023-12-19 DIAGNOSIS — B96.89 BV (BACTERIAL VAGINOSIS): ICD-10-CM

## 2023-12-19 DIAGNOSIS — Z12.31 VISIT FOR SCREENING MAMMOGRAM: ICD-10-CM

## 2023-12-19 DIAGNOSIS — N76.0 BV (BACTERIAL VAGINOSIS): ICD-10-CM

## 2023-12-19 DIAGNOSIS — Z01.419 ENCOUNTER FOR GYNECOLOGICAL EXAMINATION WITHOUT ABNORMAL FINDING: Primary | ICD-10-CM

## 2023-12-19 PROCEDURE — 3066F NEPHROPATHY DOC TX: CPT | Mod: CPTII,S$GLB,, | Performed by: OBSTETRICS & GYNECOLOGY

## 2023-12-19 PROCEDURE — 4010F PR ACE/ARB THEARPY RXD/TAKEN: ICD-10-PCS | Mod: CPTII,S$GLB,, | Performed by: OBSTETRICS & GYNECOLOGY

## 2023-12-19 PROCEDURE — 3061F PR NEG MICROALBUMINURIA RESULT DOCUMENTED/REVIEW: ICD-10-PCS | Mod: CPTII,S$GLB,, | Performed by: OBSTETRICS & GYNECOLOGY

## 2023-12-19 PROCEDURE — 3008F PR BODY MASS INDEX (BMI) DOCUMENTED: ICD-10-PCS | Mod: CPTII,S$GLB,, | Performed by: OBSTETRICS & GYNECOLOGY

## 2023-12-19 PROCEDURE — 3074F PR MOST RECENT SYSTOLIC BLOOD PRESSURE < 130 MM HG: ICD-10-PCS | Mod: CPTII,S$GLB,, | Performed by: OBSTETRICS & GYNECOLOGY

## 2023-12-19 PROCEDURE — 3078F DIAST BP <80 MM HG: CPT | Mod: CPTII,S$GLB,, | Performed by: OBSTETRICS & GYNECOLOGY

## 2023-12-19 PROCEDURE — 3061F NEG MICROALBUMINURIA REV: CPT | Mod: CPTII,S$GLB,, | Performed by: OBSTETRICS & GYNECOLOGY

## 2023-12-19 PROCEDURE — 3008F BODY MASS INDEX DOCD: CPT | Mod: CPTII,S$GLB,, | Performed by: OBSTETRICS & GYNECOLOGY

## 2023-12-19 PROCEDURE — 4010F ACE/ARB THERAPY RXD/TAKEN: CPT | Mod: CPTII,S$GLB,, | Performed by: OBSTETRICS & GYNECOLOGY

## 2023-12-19 PROCEDURE — 1160F PR REVIEW ALL MEDS BY PRESCRIBER/CLIN PHARMACIST DOCUMENTED: ICD-10-PCS | Mod: CPTII,S$GLB,, | Performed by: OBSTETRICS & GYNECOLOGY

## 2023-12-19 PROCEDURE — 99396 PREV VISIT EST AGE 40-64: CPT | Mod: S$GLB,,, | Performed by: OBSTETRICS & GYNECOLOGY

## 2023-12-19 PROCEDURE — 99999 PR PBB SHADOW E&M-EST. PATIENT-LVL III: ICD-10-PCS | Mod: PBBFAC,,, | Performed by: OBSTETRICS & GYNECOLOGY

## 2023-12-19 PROCEDURE — 3078F PR MOST RECENT DIASTOLIC BLOOD PRESSURE < 80 MM HG: ICD-10-PCS | Mod: CPTII,S$GLB,, | Performed by: OBSTETRICS & GYNECOLOGY

## 2023-12-19 PROCEDURE — 99999 PR PBB SHADOW E&M-EST. PATIENT-LVL III: CPT | Mod: PBBFAC,,, | Performed by: OBSTETRICS & GYNECOLOGY

## 2023-12-19 PROCEDURE — 1160F RVW MEDS BY RX/DR IN RCRD: CPT | Mod: CPTII,S$GLB,, | Performed by: OBSTETRICS & GYNECOLOGY

## 2023-12-19 PROCEDURE — 3066F PR DOCUMENTATION OF TREATMENT FOR NEPHROPATHY: ICD-10-PCS | Mod: CPTII,S$GLB,, | Performed by: OBSTETRICS & GYNECOLOGY

## 2023-12-19 PROCEDURE — 1159F PR MEDICATION LIST DOCUMENTED IN MEDICAL RECORD: ICD-10-PCS | Mod: CPTII,S$GLB,, | Performed by: OBSTETRICS & GYNECOLOGY

## 2023-12-19 PROCEDURE — 99396 PR PREVENTIVE VISIT,EST,40-64: ICD-10-PCS | Mod: S$GLB,,, | Performed by: OBSTETRICS & GYNECOLOGY

## 2023-12-19 PROCEDURE — 1159F MED LIST DOCD IN RCRD: CPT | Mod: CPTII,S$GLB,, | Performed by: OBSTETRICS & GYNECOLOGY

## 2023-12-19 PROCEDURE — 3074F SYST BP LT 130 MM HG: CPT | Mod: CPTII,S$GLB,, | Performed by: OBSTETRICS & GYNECOLOGY

## 2023-12-19 RX ORDER — METRONIDAZOLE 7.5 MG/G
1 GEL VAGINAL DAILY
Qty: 70 G | Refills: 0 | Status: SHIPPED | OUTPATIENT
Start: 2023-12-19 | End: 2023-12-29

## 2023-12-19 NOTE — PROGRESS NOTES
HISTORY OF PRESENT ILLNESS:    Sophie Courtney is a 43 y.o. female,   presents today for her routine visit and has no complaints.      Past Medical History:   Diagnosis Date    Hyperlipidemia     Hypertension        Past Surgical History:   Procedure Laterality Date    ACHILLES TENDON SURGERY Left     hysterecotmy      KNEE ARTHROSCOPY W/ ACL RECONSTRUCTION Right     MYOMECTOMY      Seven uterine fibroids, largest 11 cm in size     MYOMECTOMY      The specimen weighs 779 g and consists of 27 rounded pieces of rubbery tan-pink tissue. The nodules vary from    SALPINGOOPHORECTOMY Right 3/12/2021    Procedure: SALPINGO-OOPHORECTOMY;  Surgeon: Peace Sandoval MD;  Location: [facility];  Service: OB/GYN;  Laterality: Right;    TOTAL ABDOMINAL HYSTERECTOMY N/A 3/12/2021    Procedure: HYSTERECTOMY, TOTAL, ABDOMINAL;  Surgeon: Peace Sandoval MD;  Location: [facility];  Service: OB/GYN;  Laterality: N/A;    WISDOM TOOTH EXTRACTION  2013 &         MEDICATIONS AND ALLERGIES:      Current Outpatient Medications:     ascorbic acid, vitamin C, (VITAMIN C) 1000 MG tablet, Take 1,000 mg by mouth once daily., Disp: , Rfl:     atorvastatin (LIPITOR) 20 MG tablet, Take 1 tablet (20 mg total) by mouth once daily., Disp: 90 tablet, Rfl: 1    multivitamin capsule, Take 1 capsule by mouth once daily., Disp: , Rfl:     valsartan (DIOVAN) 80 MG tablet, Take 1 tablet (80 mg total) by mouth once daily., Disp: 90 tablet, Rfl: 1    chlorthalidone (HYGROTEN) 25 MG Tab, Take 1 tablet (25 mg total) by mouth once daily., Disp: 90 tablet, Rfl: 1    ELDERBERRY FRUIT ORAL, Take by mouth., Disp: , Rfl:     ergocalciferol, vitamin D2, (VITAMIN D ORAL), Take by mouth. Per patient, she takes vitamin D 3 tab, not D 2., Disp: , Rfl:     metroNIDAZOLE (METROGEL VAGINAL) 0.75 % (37.5mg/5 gram) vaginal gel, Place 1 applicator vaginally once daily. Use at bedtime for 7 days, Disp: 1 g, Rfl: 0    Review of patient's  allergies indicates:   Allergen Reactions    Cobalt Hives, Rash and Swelling    Crestor [rosuvastatin] Other (See Comments)     Muscle weakness    Nickel sutures [surgical stainless steel] Hives    Neosporin [hydrocortisone] Rash       Family History   Problem Relation Age of Onset    Hypertension Father     Hypertension Mother     Stroke Maternal Aunt     Breast cancer Paternal Aunt 60       Social History     Socioeconomic History    Marital status: Single   Tobacco Use    Smoking status: Never    Smokeless tobacco: Never   Substance and Sexual Activity    Alcohol use: No    Drug use: No    Sexual activity: Never     Social Determinants of Health     Financial Resource Strain: Low Risk  (7/4/2023)    Overall Financial Resource Strain (CARDIA)     Difficulty of Paying Living Expenses: Not very hard   Food Insecurity: No Food Insecurity (7/4/2023)    Hunger Vital Sign     Worried About Running Out of Food in the Last Year: Never true     Ran Out of Food in the Last Year: Never true   Transportation Needs: No Transportation Needs (7/4/2023)    PRAPARE - Transportation     Lack of Transportation (Medical): No     Lack of Transportation (Non-Medical): No   Physical Activity: Sufficiently Active (7/4/2023)    Exercise Vital Sign     Days of Exercise per Week: 4 days     Minutes of Exercise per Session: 60 min   Stress: No Stress Concern Present (7/4/2023)    [employer] [place] - Occupational Stress Questionnaire     Feeling of Stress : Not at all   Social Connections: Unknown (7/4/2023)    Social Connection and Isolation Panel [NHANES]     Frequency of Communication with Friends and Family: More than three times a week     Frequency of Social Gatherings with Friends and Family: Once a week     Active Member of Clubs or Organizations: Yes     Attends Club or Organization Meetings: More than 4 times per year     Marital Status: Never    Housing Stability: Low Risk  (7/4/2023)    Housing  "Stability Vital Sign     Unable to Pay for Housing in the Last Year: No     Number of Places Lived in the Last Year: 1     Unstable Housing in the Last Year: No       COMPREHENSIVE GYN HISTORY:  PAP History: Denies abnormal Paps.  Infection History: Denies STDs. Denies PID.  Benign History: Denies uterine fibroids. Denies ovarian cysts. Denies endometriosis. Denies other conditions.  Cancer History: Denies cervical cancer. Denies uterine cancer or hyperplasia. Denies ovarian cancer. Denies vulvar cancer or pre-cancer. Denies vaginal cancer or pre-cancer. Denies breast cancer. Denies colon cancer.  Sexual Activity History: Reports currently being sexually active  Menstrual History: Denies menses. Pt is  not on ERT.     ROS:  GENERAL: No weight changes. No swelling. No fatigue. No fever.  CARDIOVASCULAR: No chest pain. No shortness of breath. No leg cramps.   NEUROLOGICAL: No headaches. No vision changes.  BREASTS: No pain. No lumps. No discharge.  ABDOMEN: No pain. No nausea. No vomiting. No diarrhea. No constipation.  REPRODUCTIVE: No abnormal bleeding.  VULVA: No pain. No lesions. No itching.  VAGINA: No relaxation. No itching. No odor. No discharge. No lesions.  URINARY: No incontinence. No nocturia. No frequency. No dysuria.    /69   Ht 5' 4" (1.626 m)   Wt 77.4 kg (170 lb 10.2 oz)   LMP 03/11/2021 (LMP Unknown)   BMI 29.29 kg/m²     PE:  APPEARANCE: Well nourished, well developed, in no acute distress.  AFFECT: WNL, alert and oriented x 3.  SKIN: No acne or hirsutism.  NECK: Neck symmetric without masses or thyromegaly.  NODES: No inguinal, cervical, axillary or femoral lymph node enlargement.  CHEST: Good respiratory effort.   ABDOMEN: Soft. No tenderness or masses. No hepatosplenomegaly. No hernias.  BREASTS: Symmetrical, no skin changes or visible lesions. No palpable masses, nipple discharge bilaterally.  PELVIC: ATROPHIC EXTERNAL FEMALE GENITALIA without lesions. Normal hair distribution. " Adequate perineal body, normal urethral meatus. VAGINA DRY without lesions or discharge. No significant cystocele or rectocele. Bimanual exam shows CERVIX and UTERUS to be SURGICALLY ABSENT. Adnexa without masses or tenderness.  EXTREMITIES: No edema.    DIAGNOSIS:  1. Encounter for gynecological examination without abnormal finding    2. Essential hypertension    3. BV (bacterial vaginosis)    4. Visit for screening mammogram        COUNSELING:  The patient was counseled today on  -osteoporosis prevention, calcium supplementation, regular weight bearing exercise.  -ACS PAP guidelines (no paps), with recommendations for yearly pelvic exams as her uterus and cervix were removed for benign reasons and ovaries remain;  -recommendation for yearly mammogram;  -to see her primary care physician for all other health maintenance.    FOLLOW-UP with me for next routine visit.

## 2023-12-22 ENCOUNTER — TELEPHONE (OUTPATIENT)
Dept: FAMILY MEDICINE | Facility: CLINIC | Age: 43
End: 2023-12-22
Payer: COMMERCIAL

## 2024-03-12 ENCOUNTER — PATIENT MESSAGE (OUTPATIENT)
Dept: ADMINISTRATIVE | Facility: HOSPITAL | Age: 44
End: 2024-03-12
Payer: COMMERCIAL

## 2024-05-30 ENCOUNTER — OFFICE VISIT (OUTPATIENT)
Dept: FAMILY MEDICINE | Facility: CLINIC | Age: 44
End: 2024-05-30
Payer: COMMERCIAL

## 2024-05-30 VITALS
SYSTOLIC BLOOD PRESSURE: 120 MMHG | DIASTOLIC BLOOD PRESSURE: 72 MMHG | BODY MASS INDEX: 31.41 KG/M2 | WEIGHT: 184 LBS | HEART RATE: 78 BPM | HEIGHT: 64 IN

## 2024-05-30 DIAGNOSIS — I10 ESSENTIAL HYPERTENSION: ICD-10-CM

## 2024-05-30 DIAGNOSIS — Z00.00 ANNUAL PHYSICAL EXAM: Primary | ICD-10-CM

## 2024-05-30 DIAGNOSIS — Z12.11 SCREENING FOR MALIGNANT NEOPLASM OF COLON: ICD-10-CM

## 2024-05-30 DIAGNOSIS — Z76.0 MEDICATION REFILL: ICD-10-CM

## 2024-05-30 PROCEDURE — 1160F RVW MEDS BY RX/DR IN RCRD: CPT | Mod: CPTII,S$GLB,, | Performed by: FAMILY MEDICINE

## 2024-05-30 PROCEDURE — 3008F BODY MASS INDEX DOCD: CPT | Mod: CPTII,S$GLB,, | Performed by: FAMILY MEDICINE

## 2024-05-30 PROCEDURE — 4010F ACE/ARB THERAPY RXD/TAKEN: CPT | Mod: CPTII,S$GLB,, | Performed by: FAMILY MEDICINE

## 2024-05-30 PROCEDURE — 1159F MED LIST DOCD IN RCRD: CPT | Mod: CPTII,S$GLB,, | Performed by: FAMILY MEDICINE

## 2024-05-30 PROCEDURE — 99396 PREV VISIT EST AGE 40-64: CPT | Mod: S$GLB,,, | Performed by: FAMILY MEDICINE

## 2024-05-30 PROCEDURE — 3074F SYST BP LT 130 MM HG: CPT | Mod: CPTII,S$GLB,, | Performed by: FAMILY MEDICINE

## 2024-05-30 PROCEDURE — 3078F DIAST BP <80 MM HG: CPT | Mod: CPTII,S$GLB,, | Performed by: FAMILY MEDICINE

## 2024-05-30 RX ORDER — VALSARTAN 80 MG/1
80 TABLET ORAL DAILY
Qty: 90 TABLET | Refills: 1 | Status: SHIPPED | OUTPATIENT
Start: 2024-05-30

## 2024-05-30 RX ORDER — ATORVASTATIN CALCIUM 20 MG/1
20 TABLET, FILM COATED ORAL DAILY
Qty: 90 TABLET | Refills: 1 | Status: SHIPPED | OUTPATIENT
Start: 2024-05-30

## 2024-05-30 NOTE — PATIENT INSTRUCTIONS
Anamika Brooks MD-Colonscopy  [id number]   [facility] [address]  Phone: [phone]  Fax: [phone]

## 2024-09-12 RX ORDER — FLUCONAZOLE 150 MG/1
150 TABLET ORAL ONCE
Qty: 1 TABLET | Refills: 0 | Status: SHIPPED | OUTPATIENT
Start: 2024-09-12 | End: 2024-09-12

## 2024-11-08 ENCOUNTER — ANESTHESIA EVENT (OUTPATIENT)
Dept: SURGERY | Facility: HOSPITAL | Age: 44
End: 2024-11-08
Payer: COMMERCIAL

## 2024-11-08 ENCOUNTER — HOSPITAL ENCOUNTER (OUTPATIENT)
Facility: HOSPITAL | Age: 44
Discharge: HOME | End: 2024-11-08
Attending: INTERNAL MEDICINE | Admitting: INTERNAL MEDICINE
Payer: COMMERCIAL

## 2024-11-08 ENCOUNTER — ANESTHESIA (OUTPATIENT)
Dept: SURGERY | Facility: HOSPITAL | Age: 44
End: 2024-11-08
Payer: COMMERCIAL

## 2024-11-08 VITALS
TEMPERATURE: 98 F | RESPIRATION RATE: 18 BRPM | DIASTOLIC BLOOD PRESSURE: 77 MMHG | SYSTOLIC BLOOD PRESSURE: 139 MMHG | OXYGEN SATURATION: 100 % | HEART RATE: 58 BPM

## 2024-11-08 DIAGNOSIS — Z12.11 SCREENING FOR COLON CANCER: ICD-10-CM

## 2024-11-08 PROCEDURE — 37000009 HC ANESTHESIA EA ADD 15 MINS: Performed by: INTERNAL MEDICINE

## 2024-11-08 PROCEDURE — 63600175 PHARM REV CODE 636 W HCPCS: Performed by: NURSE ANESTHETIST, CERTIFIED REGISTERED

## 2024-11-08 PROCEDURE — G0121 COLON CA SCRN NOT HI RSK IND: HCPCS | Performed by: INTERNAL MEDICINE

## 2024-11-08 PROCEDURE — 25000003 PHARM REV CODE 250: Performed by: INTERNAL MEDICINE

## 2024-11-08 PROCEDURE — 37000008 HC ANESTHESIA 1ST 15 MINUTES: Performed by: INTERNAL MEDICINE

## 2024-11-08 PROCEDURE — 25000003 PHARM REV CODE 250: Performed by: NURSE ANESTHETIST, CERTIFIED REGISTERED

## 2024-11-08 RX ORDER — DEXTROMETHORPHAN/PSEUDOEPHED 2.5-7.5/.8
DROPS ORAL
Status: DISCONTINUED | OUTPATIENT
Start: 2024-11-08 | End: 2024-11-08 | Stop reason: HOSPADM

## 2024-11-08 RX ORDER — PROPOFOL 10 MG/ML
VIAL (ML) INTRAVENOUS
Status: DISCONTINUED | OUTPATIENT
Start: 2024-11-08 | End: 2024-11-08

## 2024-11-08 RX ADMIN — PROPOFOL 50 MG: 10 INJECTION, EMULSION INTRAVENOUS at 10:11

## 2024-11-08 RX ADMIN — SODIUM CHLORIDE: 0.9 INJECTION, SOLUTION INTRAVENOUS at 10:11

## 2024-11-08 RX ADMIN — PROPOFOL 30 MG: 10 INJECTION, EMULSION INTRAVENOUS at 10:11

## 2024-11-08 RX ADMIN — PROPOFOL 70 MG: 10 INJECTION, EMULSION INTRAVENOUS at 10:11

## 2024-11-08 NOTE — ANESTHESIA POSTPROCEDURE EVALUATION
Anesthesia Post Evaluation    Patient: Sophie Courtney    Procedure(s) Performed: Procedure(s) (LRB):  COLONOSCOPY (N/A)    Final Anesthesia Type: general      Patient location during evaluation: GI PACU  Patient participation: Yes- Able to Participate  Level of consciousness: awake and alert and oriented  Post-procedure vital signs: reviewed and stable  Pain management: adequate  Airway patency: patent    PONV status at discharge: No PONV  Anesthetic complications: no      Cardiovascular status: blood pressure returned to baseline  Respiratory status: unassisted, spontaneous ventilation and room air  Hydration status: euvolemic  Follow-up not needed.              Vitals Value Taken Time   /77 11/08/24 1035   Temp 36.9 °C (98.4 °F) 11/08/24 1027   Pulse 59 11/08/24 1038   Resp 18 11/08/24 1035   SpO2 100 % 11/08/24 1038   Vitals shown include unfiled device data.      Event Time   Out of Recovery 10:40:56         Pain/Rhianna Score: No data recorded

## 2024-11-08 NOTE — TRANSFER OF CARE
Anesthesia Transfer of Care Note    Patient: Sophie Courtney    Procedure(s) Performed: Procedure(s) (LRB):  COLONOSCOPY (N/A)    Patient location: GI    Anesthesia Type: general    Transport from OR: Transported from OR on 2-3 L/min O2 by NC with adequate spontaneous ventilation    Post pain: adequate analgesia    Post assessment: no apparent anesthetic complications    Post vital signs: stable    Level of consciousness: awake    Nausea/Vomiting: no nausea/vomiting    Complications: none    Transfer of care protocol was followed    Last vitals: Visit Vitals  BP (!) 145/79 (BP Location: Left arm, Patient Position: Lying)   Pulse 69   Temp 36.8 °C (98.2 °F) (Temporal)   Resp 18   LMP 03/11/2021 (LMP Unknown)   SpO2 100%   Breastfeeding No

## 2024-11-08 NOTE — H&P
GASTROENTEROLOGY PRE-PROCEDURE H&P NOTE  Patient Name: Sophie Courtney  Patient MRN: [id number]  Patient : 1980    Service date: 2024    PCP: Rohan Pringle MD    No chief complaint on file.      HPI: Patient is a 44 yo female with h/o htn, hld referred for average risk screening colonoscopy.    Patient denies any change in her bowel function.  She denies FH colon cancer, rectal bleeding or abdominal pain.       Past Medical History:  Past Medical History:   Diagnosis Date    Hyperlipidemia     Hypertension         Past Surgical History:  Past Surgical History:   Procedure Laterality Date    ACHILLES TENDON SURGERY Left     hysterecotmy      KNEE ARTHROSCOPY W/ ACL RECONSTRUCTION Right     MYOMECTOMY  2009    Seven uterine fibroids, largest 11 cm in size     MYOMECTOMY      The specimen weighs 779 g and consists of 27 rounded pieces of rubbery tan-pink tissue. The nodules vary from    SALPINGOOPHORECTOMY Right 3/12/2021    Procedure: SALPINGO-OOPHORECTOMY;  Surgeon: Peace Sandoval MD;  Location: [facility];  Service: OB/GYN;  Laterality: Right;    TOTAL ABDOMINAL HYSTERECTOMY N/A 3/12/2021    Procedure: HYSTERECTOMY, TOTAL, ABDOMINAL;  Surgeon: Peace Sandoval MD;  Location: [facility] OR;  Service: OB/GYN;  Laterality: N/A;    WISDOM TOOTH EXTRACTION  2013 &          Home Medications:  No medications prior to admission.               Review of patient's allergies indicates:   Allergen Reactions    Cobalt Hives, Rash and Swelling    Crestor [rosuvastatin] Other (See Comments)     Muscle weakness    Nickel sutures [surgical stainless steel] Hives    Neosporin [hydrocortisone] Rash       Social History:   Social History     Occupational History    Not on file   Tobacco Use    Smoking status: Never    Smokeless tobacco: Never   Substance and Sexual Activity    Alcohol use: No    Drug use: No    Sexual activity: Never       Family History:   Family History   Problem Relation  "Name Age of Onset    Hypertension Father      Hypertension Mother      Stroke Maternal Aunt      Breast cancer Paternal Aunt  60       Review of Systems:  GENERAL: No fever, chills, weight loss  SKIN: No rashes, jaundice, pruritus  HEENT: No rhinorrhea, epistaxis, vision changes.  No trauma, tinnitus, lymphadenopathy or pharyngitis  CV: No chest pain, palpitations, edima or FOFANA  PULM: No cough or sputum production.  No wheezing.  GI: AS IN HPI  URINARY:  No hematuria, dysuria  MS: No change in muscle or joint pain, weakness, or ROM  Neuro: No focal neurologic changes  PSYCH: No change in mood or personality.  No suicidal ideation.  ENDOCRINE: No fatigue      OBJECTIVE:    There were no vitals filed for this visit.    Physical Exam:  24 Hour Vital Sign Ranges:    Most recent vitals: LMP 03/11/2021 (LMP Unknown)    GEN: well-developed, well-nourished, awake and alert, non-toxic appearing adult  HEENT: PERRL, sclera anicteric, oral mucosa pink and moist without lesion  NECK: trachea midline; Good ROM  CV: regular rate and rhythm, no murmurs or gallops  RESP: clear to auscultation bilaterally, no wheezes, rhonci or rales  ABD: soft, non-tender, non-distended, normal bowel sounds  EXT: no swelling or edema, 2+ pulses distally  SKIN: no rashes or jaundice  PSYCH: normal affect    Labs:   No results for input(s): "WBC", "HGB", "HEMATOCRIT", "PLT", "MCV" in the last 168 hours.  No results for input(s): "IRON", "FERRITIN" in the last 168 hours.    Invalid input(s): "IRONSAT"  No results for input(s): "NA", "K", "BUN", "CREATININE", "ALBUMIN", "AST", "ALT", "ALKPHOS", "BILIRUBIN", "INR" in the last 168 hours.    Invalid input(s): "TBIL"        Radiology:        CHART REVIEW:        IMPRESSION / RECOMMENDATIONS:    Average risk colon cancer screening   Colonoscopy  with interventions as warranted.     RIsks, benefits, alternatives discussed in detail regarding upcoming procedures and sedation. Some of the more common " endoscopic complications include but not limited to immediate or delayed perforation, bleeding, infections, pain, inadvertent injury to surrounding tissue / organs and possible need for surgical evaluation. Patient expressed understanding, all questions answered and will proceed with procedure as planned.     Anamika Brooks  11/7/2024  9:02 PM

## 2024-11-08 NOTE — [employer] PATIENT INSTRUCTIONS
Discharge Summary/Instructions after an Endoscopic Procedure  Patient Name: Sophie Courteny  Patient MRN: [id number]  Patient YOB: 1980 Friday, November 8, 2024  Anamika Brooks MD  RESTRICTIONS:  During your procedure today, you received medications for sedation.  These   medications may affect your judgment, balance and coordination.  Therefore,   for 24 hours, you have the following restrictions:   - DO NOT drive a car, operate machinery, make legal/financial decisions,   sign important papers or drink alcohol.    ACTIVITY:  Today: no heavy lifting, straining or running due to procedural   sedation/anesthesia.  The following day: return to full activity including work.  DIET:  Eat and drink normally unless instructed otherwise.     TREATMENT FOR COMMON SIDE EFFECTS:  - Mild abdominal pain, nausea, belching, bloating or excessive gas:  rest,   eat lightly and use a heating pad.  - Sore Throat: treat with throat lozenges and/or gargle with warm salt   water.  - Because air was used during the procedure, expelling large amounts of air   from your rectum or belching is normal.  - If a bowel prep was taken, you may not have a bowel movement for 1-3 days.    This is normal.  SYMPTOMS TO WATCH FOR AND REPORT TO YOUR PHYSICIAN:  1. Abdominal pain or bloating, other than gas cramps.  2. Chest pain.  3. Back pain.  4. Signs of infection such as: chills or fever occurring within 24 hours   after the procedure.  5. Rectal bleeding, which would show as bright red, maroon, or black stools.   (A tablespoon of blood from the rectum is not serious, especially if   hemorrhoids are present.)  6. Vomiting.  7. Weakness or dizziness.  GO DIRECTLY TO THE NEAREST EMERGENCY ROOM IF YOU HAVE ANY OF THE FOLLOWING:      Difficulty breathing              Chills and/or fever over 101 F   Persistent vomiting and/or vomiting blood   Severe abdominal pain   Severe chest pain   Black, tarry stools   Bleeding- more than one  tablespoon   Any other symptom or condition that you feel may need urgent attention  Your doctor recommends these additional instructions:  If any biopsies were taken, your doctors clinic will contact you in 1 to 2   weeks with any results.  - Repeat colonoscopy in 10 years for screening purposes.   - Discharge patient to home (with escort).  For questions, problems or results please call your physician - Anamika Brooks MD at Work:  [phone].  [facility], EMERGENCY ROOM PHONE NUMBER: [phone]  IF A COMPLICATION OR EMERGENCY SITUATION ARISES AND YOU ARE UNABLE TO REACH   YOUR PHYSICIAN - GO DIRECTLY TO THE EMERGENCY ROOM.  Anamika Brooks MD  11/8/2024 10:25:05 AM  This report has been verified and signed electronically.  Dear patient,  As a result of recent federal legislation (The Federal Cures Act), you may   receive lab or pathology results from your procedure in your [facility]   account before your physician is able to contact you. Your physician or   their representative will relay the results to you with their   recommendations at their soonest availability.  Thank you,  [employer]ATION

## 2024-11-08 NOTE — ANESTHESIA PREPROCEDURE EVALUATION
11/08/2024  Sophie Courtney is a 43 y.o., female.      Patient Active Problem List   Diagnosis    Menorrhagia    Essential hypertension    Intramural leiomyoma of uterus    S/P OSMIN (total abdominal hysterectomy)/RSO       Past Surgical History:   Procedure Laterality Date    ACHILLES TENDON SURGERY Left 2003    hysterecotmy      KNEE ARTHROSCOPY W/ ACL RECONSTRUCTION Right 1999    MYOMECTOMY  2009    Seven uterine fibroids, largest 11 cm in size     MYOMECTOMY  2014    The specimen weighs 779 g and consists of 27 rounded pieces of rubbery tan-pink tissue. The nodules vary from    SALPINGOOPHORECTOMY Right 3/12/2021    Procedure: SALPINGO-OOPHORECTOMY;  Surgeon: Peace Sandoval MD;  Location: [facility] OR;  Service: OB/GYN;  Laterality: Right;    TOTAL ABDOMINAL HYSTERECTOMY N/A 3/12/2021    Procedure: HYSTERECTOMY, TOTAL, ABDOMINAL;  Surgeon: Peace Sandoval MD;  Location: [facility] OR;  Service: OB/GYN;  Laterality: N/A;    WISDOM TOOTH EXTRACTION  2013 2013 & 2015         Tobacco Use:  The patient  reports that she has never smoked. She has never used smokeless tobacco.     No results found for this or any previous visit.        No results found for this or any previous visit.              Pre-op Assessment    I have reviewed the Patient Summary Reports.     I have reviewed the Nursing Notes. I have reviewed the NPO Status.   I have reviewed the Medications.     Review of Systems  Anesthesia Hx:  No problems with previous Anesthesia             Denies Family Hx of Anesthesia complications.    Denies Personal Hx of Anesthesia complications.                    Social:  Non-Smoker       Hematology/Oncology:  Hematology Normal                                     Cardiovascular:     Hypertension, well controlled                                          Pulmonary:  Pulmonary Normal                        Hepatic/GI:  Hepatic/GI Normal                    Musculoskeletal:  Musculoskeletal Normal                Neurological:  Neurology Normal                                      Endocrine:  Endocrine Normal                Physical Exam  General: Well nourished, Cooperative, Alert and Oriented    Airway:  Mallampati: III / II  Mouth Opening: Normal  TM Distance: Normal  Tongue: Normal  Neck ROM: Normal ROM    Dental:  Intact    Chest/Lungs:  Clear to auscultation    Heart:  Rate: Normal  Rhythm: Regular Rhythm  Sounds: Normal    Abdomen:  Normal, Soft, Nontender        Anesthesia Plan  Type of Anesthesia, risks & benefits discussed:    Anesthesia Type: Gen Natural Airway  Intra-op Monitoring Plan: Standard ASA Monitors  Post Op Pain Control Plan:   (medical reason for not using multimodal pain management)  Induction:  IV  Informed Consent: Informed consent signed with the Patient and all parties understand the risks and agree with anesthesia plan.  All questions answered. Patient consented to blood products? No  ASA Score: 2  Anesthesia Plan Notes:   General Natural Airway  Propofol  Zofran    Ready For Surgery From Anesthesia Perspective.     .

## 2024-11-16 NOTE — PROGRESS NOTES
SUBJECTIVE:    Patient ID: Sophie Courtney is a 43 y.o. female.    Chief Complaint: Follow-up (6 mo f/u//no med bottles//fluarix ordered//mammogram scheduled for next week//tc)    Pt here to checkup on acute and chronic conditions.    BP is doing today.   Denies CP/SOB/HA.  Has been taking chlorthalindone, diovan.  Has been exercising.     Had labs done after last visit: , fBS 84, GFR 75, TSH 1.13, AST/ALT 38/44    Has not been better taking her lipitor. Used to take at least twice a week.          -------------------------------------------------------------------------------------------------  Mammo 10/2023, scheduled for next week.   Pap (Jaime)  Cscope 11/2024, to repeat in 10 years (Todd)       Orders Only on 11/20/2024   Component Date Value Ref Range Status    Cholesterol 11/20/2024 231 (H)  <200 mg/dL Final    HDL 11/20/2024 71  > OR = 50 mg/dL Final    Triglycerides 11/20/2024 55  <150 mg/dL Final    LDL Cholesterol 11/20/2024 145 (H)  mg/dL (calc) Final    Comment: Reference range: <100     Desirable range <100 mg/dL for primary prevention;    <70 mg/dL for patients with CHD or diabetic patients   with > or = 2 CHD risk factors.     LDL-C is now calculated using the Travis   calculation, which is a validated novel method providing   better accuracy than the Friedewald equation in the   estimation of LDL-C.   Michael HARRELL et al. KEZIA. 2013;310(19): 1853-0407   (http://education.Pica8/faq/WID107)      HDL/Cholesterol Ratio 11/20/2024 3.3  <5.0 (calc) Final    Non HDL Chol. (LDL+VLDL) 11/20/2024 160 (H)  <130 mg/dL (calc) Final    Comment: For patients with diabetes plus 1 major ASCVD risk   factor, treating to a non-HDL-C goal of <100 mg/dL   (LDL-C of <70 mg/dL) is considered a therapeutic   option.      Glucose 11/20/2024 84  65 - 99 mg/dL Final    Comment:               Fasting reference interval         BUN 11/20/2024 18  7 - 25 mg/dL Final    Creatinine 11/20/2024  0.95  0.50 - 0.99 mg/dL Final    eGFR 11/20/2024 75  > OR = 60 mL/min/1.73m2 Final    BUN/Creatinine Ratio 11/20/2024 SEE NOTE:  6 - 22 (calc) Final    Comment:    Not Reported: BUN and Creatinine are within     reference range.            Sodium 11/20/2024 136  135 - 146 mmol/L Final    Potassium 11/20/2024 4.0  3.5 - 5.3 mmol/L Final    Chloride 11/20/2024 100  98 - 110 mmol/L Final    CO2 11/20/2024 27  20 - 32 mmol/L Final    Calcium 11/20/2024 9.6  8.6 - 10.2 mg/dL Final    Total Protein 11/20/2024 7.2  6.1 - 8.1 g/dL Final    Albumin 11/20/2024 4.6  3.6 - 5.1 g/dL Final    Globulin, Total 11/20/2024 2.6  1.9 - 3.7 g/dL (calc) Final    Albumin/Globulin Ratio 11/20/2024 1.8  1.0 - 2.5 (calc) Final    Total Bilirubin 11/20/2024 0.8  0.2 - 1.2 mg/dL Final    Alkaline Phosphatase 11/20/2024 48  31 - 125 U/L Final    AST 11/20/2024 38 (H)  10 - 35 U/L Final    ALT 11/20/2024 44 (H)  6 - 29 U/L Final    Color, UA 11/20/2024 YELLOW  YELLOW Final    Appearance, UA 11/20/2024 CLEAR  CLEAR Final    Specific Gravity, UA 11/20/2024 1.010  1.001 - 1.035 Final    pH, UA 11/20/2024 < OR = 5.0  5.0 - 8.0 Final    Glucose, UA 11/20/2024 NEGATIVE  NEGATIVE Final    Bilirubin, UA 11/20/2024 NEGATIVE  NEGATIVE Final    Ketones, UA 11/20/2024 NEGATIVE  NEGATIVE Final    Occult Blood UA 11/20/2024 NEGATIVE  NEGATIVE Final    Protein, UA 11/20/2024 NEGATIVE  NEGATIVE Final    Nitrite, UA 11/20/2024 NEGATIVE  NEGATIVE Final    Leukocytes, UA 11/20/2024 NEGATIVE  NEGATIVE Final    WBC Casts, UA 11/20/2024 NONE SEEN  < OR = 5 /HPF Final    RBC Casts, UA 11/20/2024 NONE SEEN  < OR = 2 /HPF Final    Squam Epithel, UA 11/20/2024 NONE SEEN  < OR = 5 /HPF Final    Bacteria, UA 11/20/2024 NONE SEEN  NONE SEEN /HPF Final    Hyaline Casts, UA 11/20/2024 NONE SEEN  NONE SEEN /LPF Final    Service Cmt: 11/20/2024 SEE COMMENT   Final    Comment: This urine was analyzed for the presence of WBC,   RBC, bacteria, casts, and other formed elements.    Only those elements seen were reported.            Reflexive Urine Culture 11/20/2024 SEE COMMENT   Final    NO CULTURE INDICATED    WBC 11/20/2024 5.4  3.8 - 10.8 Thousand/uL Final    RBC 11/20/2024 4.35  3.80 - 5.10 Million/uL Final    Hemoglobin 11/20/2024 13.5  11.7 - 15.5 g/dL Final    Hematocrit 11/20/2024 41.1  35.0 - 45.0 % Final    MCV 11/20/2024 94.5  80.0 - 100.0 fL Final    MCH 11/20/2024 31.0  27.0 - 33.0 pg Final    MCHC 11/20/2024 32.8  32.0 - 36.0 g/dL Final    Comment: For adults, a slight decrease in the calculated MCHC  value (in the range of 30 to 32 g/dL) is most likely  not clinically significant; however, it should be  interpreted with caution in correlation with other  red cell parameters and the patient's clinical  condition.      RDW 11/20/2024 12.3  11.0 - 15.0 % Final    Platelets 11/20/2024 286  140 - 400 Thousand/uL Final    MPV 11/20/2024 10.9  7.5 - 12.5 fL Final    Neutrophils, Abs 11/20/2024 3,451  1,500 - 7,800 cells/uL Final    Lymph # 11/20/2024 1,636  850 - 3,900 cells/uL Final    Mono # 11/20/2024 259  200 - 950 cells/uL Final    Eos # 11/20/2024 32  15 - 500 cells/uL Final    Baso # 11/20/2024 22  0 - 200 cells/uL Final    Neutrophils Relative 11/20/2024 63.9  % Final    Lymph % 11/20/2024 30.3  % Final    Mono % 11/20/2024 4.8  % Final    Eosinophil % 11/20/2024 0.6  % Final    Basophil % 11/20/2024 0.4  % Final    TSH w/reflex to FT4 11/20/2024 1.13  mIU/L Final    Comment:           Reference Range                         > or = 20 Years  0.40-4.50                              Pregnancy Ranges            First trimester    0.26-2.66            Second trimester   0.55-2.73            Third trimester    0.43-2.91           Orders Only on 11/20/2024   Component Date Value Ref Range Status    Cholesterol 11/20/2024 231 (H)  <200 mg/dL Final    HDL 11/20/2024 71  > OR = 50 mg/dL Final    Triglycerides 11/20/2024 55  <150 mg/dL Final    LDL Cholesterol 11/20/2024 145 (H)  mg/dL  (calc) Final    HDL/Cholesterol Ratio 11/20/2024 3.3  <5.0 (calc) Final    Non HDL Chol. (LDL+VLDL) 11/20/2024 160 (H)  <130 mg/dL (calc) Final    Glucose 11/20/2024 84  65 - 99 mg/dL Final    BUN 11/20/2024 18  7 - 25 mg/dL Final    Creatinine 11/20/2024 0.95  0.50 - 0.99 mg/dL Final    eGFR 11/20/2024 75  > OR = 60 mL/min/1.73m2 Final    BUN/Creatinine Ratio 11/20/2024 SEE NOTE:  6 - 22 (calc) Final    Sodium 11/20/2024 136  135 - 146 mmol/L Final    Potassium 11/20/2024 4.0  3.5 - 5.3 mmol/L Final    Chloride 11/20/2024 100  98 - 110 mmol/L Final    CO2 11/20/2024 27  20 - 32 mmol/L Final    Calcium 11/20/2024 9.6  8.6 - 10.2 mg/dL Final    Total Protein 11/20/2024 7.2  6.1 - 8.1 g/dL Final    Albumin 11/20/2024 4.6  3.6 - 5.1 g/dL Final    Globulin, Total 11/20/2024 2.6  1.9 - 3.7 g/dL (calc) Final    Albumin/Globulin Ratio 11/20/2024 1.8  1.0 - 2.5 (calc) Final    Total Bilirubin 11/20/2024 0.8  0.2 - 1.2 mg/dL Final    Alkaline Phosphatase 11/20/2024 48  31 - 125 U/L Final    AST 11/20/2024 38 (H)  10 - 35 U/L Final    ALT 11/20/2024 44 (H)  6 - 29 U/L Final    Color, UA 11/20/2024 YELLOW  YELLOW Final    Appearance, UA 11/20/2024 CLEAR  CLEAR Final    Specific Gravity, UA 11/20/2024 1.010  1.001 - 1.035 Final    pH, UA 11/20/2024 < OR = 5.0  5.0 - 8.0 Final    Glucose, UA 11/20/2024 NEGATIVE  NEGATIVE Final    Bilirubin, UA 11/20/2024 NEGATIVE  NEGATIVE Final    Ketones, UA 11/20/2024 NEGATIVE  NEGATIVE Final    Occult Blood UA 11/20/2024 NEGATIVE  NEGATIVE Final    Protein, UA 11/20/2024 NEGATIVE  NEGATIVE Final    Nitrite, UA 11/20/2024 NEGATIVE  NEGATIVE Final    Leukocytes, UA 11/20/2024 NEGATIVE  NEGATIVE Final    WBC Casts, UA 11/20/2024 NONE SEEN  < OR = 5 /HPF Final    RBC Casts, UA 11/20/2024 NONE SEEN  < OR = 2 /HPF Final    Squam Epithel, UA 11/20/2024 NONE SEEN  < OR = 5 /HPF Final    Bacteria, UA 11/20/2024 NONE SEEN  NONE SEEN /HPF Final    Hyaline Casts, UA 11/20/2024 NONE SEEN  NONE SEEN /LPF  Final    Service Cmt: 11/20/2024 SEE COMMENT   Final    Reflexive Urine Culture 11/20/2024 SEE COMMENT   Final    WBC 11/20/2024 5.4  3.8 - 10.8 Thousand/uL Final    RBC 11/20/2024 4.35  3.80 - 5.10 Million/uL Final    Hemoglobin 11/20/2024 13.5  11.7 - 15.5 g/dL Final    Hematocrit 11/20/2024 41.1  35.0 - 45.0 % Final    MCV 11/20/2024 94.5  80.0 - 100.0 fL Final    MCH 11/20/2024 31.0  27.0 - 33.0 pg Final    MCHC 11/20/2024 32.8  32.0 - 36.0 g/dL Final    RDW 11/20/2024 12.3  11.0 - 15.0 % Final    Platelets 11/20/2024 286  140 - 400 Thousand/uL Final    MPV 11/20/2024 10.9  7.5 - 12.5 fL Final    Neutrophils, Abs 11/20/2024 3,451  1,500 - 7,800 cells/uL Final    Lymph # 11/20/2024 1,636  850 - 3,900 cells/uL Final    Mono # 11/20/2024 259  200 - 950 cells/uL Final    Eos # 11/20/2024 32  15 - 500 cells/uL Final    Baso # 11/20/2024 22  0 - 200 cells/uL Final    Neutrophils Relative 11/20/2024 63.9  % Final    Lymph % 11/20/2024 30.3  % Final    Mono % 11/20/2024 4.8  % Final    Eosinophil % 11/20/2024 0.6  % Final    Basophil % 11/20/2024 0.4  % Final    TSH w/reflex to FT4 11/20/2024 1.13  mIU/L Final       Past Medical History:   Diagnosis Date    Hyperlipidemia     Hypertension      Social History     Socioeconomic History    Marital status: Single   Tobacco Use    Smoking status: Never    Smokeless tobacco: Never   Substance and Sexual Activity    Alcohol use: No    Drug use: No    Sexual activity: Never     Social Drivers of Health     Financial Resource Strain: Low Risk  (5/26/2024)    Overall Financial Resource Strain (CARDIA)     Difficulty of Paying Living Expenses: Not very hard   Food Insecurity: No Food Insecurity (5/26/2024)    Hunger Vital Sign     Worried About Running Out of Food in the Last Year: Never true     Ran Out of Food in the Last Year: Never true   Transportation Needs: No Transportation Needs (7/4/2023)    PRAPARE - Transportation     Lack of Transportation (Medical): No     Lack of  Transportation (Non-Medical): No   Physical Activity: Sufficiently Active (5/26/2024)    Exercise Vital Sign     Days of Exercise per Week: 4 days     Minutes of Exercise per Session: 60 min   Stress: No Stress Concern Present (5/26/2024)    [employer] - Occupational Stress Questionnaire     Feeling of Stress : Only a little   Housing Stability: Low Risk  (7/4/2023)    Housing Stability Vital Sign     Unable to Pay for Housing in the Last Year: No     Number of Places Lived in the Last Year: 1     Unstable Housing in the Last Year: No     Past Surgical History:   Procedure Laterality Date    ACHILLES TENDON SURGERY Left 2003    COLONOSCOPY N/A 11/08/2024    Procedure: COLONOSCOPY;  Surgeon: Anamika Brooks MD;  Location: [facility];  Service: Endoscopy;  Laterality: N/A;  DIRECT ACCESS    [email or web address]      KNEE ARTHROSCOPY W/ ACL RECONSTRUCTION Right 1999    MYOMECTOMY  2009    Seven uterine fibroids, largest 11 cm in size     MYOMECTOMY  2014    The specimen weighs 779 g and consists of 27 rounded pieces of rubbery tan-pink tissue. The nodules vary from    SALPINGOOPHORECTOMY Right 03/12/2021    Procedure: SALPINGO-OOPHORECTOMY;  Surgeon: Peace Sandoval MD;  Location: [facility];  Service: OB/GYN;  Laterality: Right;    TOTAL ABDOMINAL HYSTERECTOMY N/A 03/12/2021    Procedure: HYSTERECTOMY, TOTAL, ABDOMINAL;  Surgeon: Peace Sandoval MD;  Location: [facility];  Service: OB/GYN;  Laterality: N/A;    WISDOM TOOTH EXTRACTION  2013 2013 & 2015      Family History   Problem Relation Name Age of Onset    Hypertension Father      Hypertension Mother      Stroke Maternal Aunt      Breast cancer Paternal Aunt  60       Review of patient's allergies indicates:   Allergen Reactions    [place] Hives, Rash and Swelling    Crestor [rosuvastatin] Other (See Comments)     Muscle weakness    Nickel sutures [surgical stainless steel] Hives    Neosporin [hydrocortisone] Rash       Current  Outpatient Medications:     ascorbic acid, vitamin C, (VITAMIN C) 1000 MG tablet, Take 1,000 mg by mouth once daily., Disp: , Rfl:     atorvastatin (LIPITOR) 20 MG tablet, Take 1 tablet (20 mg total) by mouth once daily., Disp: 90 tablet, Rfl: 1    chlorthalidone (HYGROTEN) 25 MG Tab, Take 1 tablet (25 mg total) by mouth once daily., Disp: 90 tablet, Rfl: 3    ergocalciferol, vitamin D2, (VITAMIN D ORAL), Take by mouth. Per patient, she takes vitamin D 3 tab, not D 2., Disp: , Rfl:     multivitamin capsule, Take 1 capsule by mouth once daily., Disp: , Rfl:     valsartan (DIOVAN) 80 MG tablet, Take 1 tablet (80 mg total) by mouth once daily., Disp: 90 tablet, Rfl: 1    peg 3350-sod sulf,chlr-pot-mag (SUFLAVE) 178.7-7.3-0.5 gram SolR, Drink 1 bottle by mouth, twice a day, split dose per prep instructions. Drink 8 oz every 15 minutes until empty followed by 16 oz of water. Repeat per prep instructions timing., Disp: 2 each, Rfl: 0  No current facility-administered medications for this visit.    Review of Systems   Constitutional:  Negative for activity change, appetite change, fatigue, fever and unexpected weight change.   HENT:  Negative for hearing loss, rhinorrhea and trouble swallowing.    Eyes:  Negative for discharge and visual disturbance.   Respiratory:  Negative for cough, chest tightness, shortness of breath and wheezing.    Cardiovascular:  Negative for chest pain, palpitations and leg swelling.   Gastrointestinal:  Negative for abdominal pain, blood in stool, constipation, diarrhea, nausea and vomiting.        -heartburn   Endocrine: Negative for polydipsia and polyuria.   Genitourinary:  Negative for difficulty urinating, dysuria, frequency, hematuria, menstrual problem and urgency.   Musculoskeletal:  Negative for arthralgias, back pain, joint swelling, myalgias and neck pain.   Skin:  Negative for rash.   Neurological:  Negative for dizziness, weakness, numbness and headaches.   Hematological:  Does not  "bruise/bleed easily.   Psychiatric/Behavioral:  Negative for confusion, dysphoric mood, sleep disturbance and suicidal ideas. The patient is not nervous/anxious.    All other systems reviewed and are negative.         Objective:      Vitals:    11/21/24 0835   BP: 120/72   Pulse: (!) 56   Weight: 87.5 kg (193 lb)   Height: 5' 4" (1.626 m)         Wt Readings from Last 6 Encounters:   11/21/24 87.5 kg (193 lb)   05/30/24 83.5 kg (184 lb)   12/19/23 77.4 kg (170 lb 10.2 oz)   07/05/23 77.6 kg (171 lb)   01/04/23 76 kg (167 lb 9.6 oz)   10/04/22 78.4 kg (172 lb 13.5 oz)         Physical Exam  Vitals reviewed.   Constitutional:       General: She is not in acute distress.     Appearance: Normal appearance. She is well-developed. She is obese.   HENT:      Head: Normocephalic and atraumatic.   Neck:      Thyroid: No thyromegaly.   Cardiovascular:      Rate and Rhythm: Normal rate and regular rhythm.      Heart sounds: Normal heart sounds. No murmur heard.     No friction rub.   Pulmonary:      Effort: Pulmonary effort is normal.      Breath sounds: Normal breath sounds. No wheezing or rales.   Abdominal:      General: Bowel sounds are normal. There is no distension.      Palpations: Abdomen is soft.      Tenderness: There is no abdominal tenderness.   Musculoskeletal:      Right hand: No bony tenderness. Decreased range of motion (5th finger flexion).      Cervical back: Neck supple.   Lymphadenopathy:      Cervical: No cervical adenopathy.   Skin:     General: Skin is warm and dry.      Findings: No rash.   Neurological:      Mental Status: She is alert and oriented to person, place, and time.   Psychiatric:         Attention and Perception: She is attentive.         Speech: Speech normal.         Behavior: Behavior normal.         Thought Content: Thought content normal.         Judgment: Judgment normal.           Assessment:       1. Essential hypertension    2. Mixed hyperlipidemia    3. Influenza vaccine " administered    4. Long-term use of high-risk medication    5. Elevated liver enzymes    6. Need for hepatitis C screening test             Plan:       Essential hypertension  Comments:  Controlled. Will continue to monitor BP on current medication regimen    Mixed hyperlipidemia  Comments:  Suboptimal. . Encouraged to resume statin again.  Orders:  -     Comprehensive Metabolic Panel; Future; Expected date: 11/21/2024  -     LIPID PANEL; Future; Expected date: 11/21/2024    Influenza vaccine administered  -     influenza (Flulaval, Fluzone, Fluarix) 45 mcg/0.5 mL IM vaccine (> or = 6 mo) 0.5 mL    Long-term use of high-risk medication  -     Comprehensive Metabolic Panel; Future; Expected date: 11/21/2024  -     LIPID PANEL; Future; Expected date: 11/21/2024  -     HEPATITIS C ANTIBODY; Future; Expected date: 11/21/2024    Elevated liver enzymes  -     Comprehensive Metabolic Panel; Future; Expected date: 11/21/2024  -     LIPID PANEL; Future; Expected date: 11/21/2024  -     HEPATITIS C ANTIBODY; Future; Expected date: 11/21/2024    Need for hepatitis C screening test  -     HEPATITIS C ANTIBODY; Future; Expected date: 11/21/2024      Other  Lab results discussed and reviewed with patient.  Labs and/or tests have been ordered for the evaluation/monitoring of acute/chronic conditions, to be done just before next visit.    Follow up in about 6 months (around 5/21/2025) for HTN, HLD, LABS.        11/21/2024 Rohan Pringle

## 2024-11-20 ENCOUNTER — TELEPHONE (OUTPATIENT)
Dept: FAMILY MEDICINE | Facility: CLINIC | Age: 44
End: 2024-11-20
Payer: COMMERCIAL

## 2024-11-20 DIAGNOSIS — Z79.899 LONG-TERM USE OF HIGH-RISK MEDICATION: ICD-10-CM

## 2024-11-20 DIAGNOSIS — E78.2 MIXED HYPERLIPIDEMIA: ICD-10-CM

## 2024-11-20 DIAGNOSIS — Z00.01 ENCOUNTER FOR GENERAL ADULT MEDICAL EXAMINATION WITH ABNORMAL FINDINGS: ICD-10-CM

## 2024-11-20 DIAGNOSIS — I10 ESSENTIAL HYPERTENSION: Primary | ICD-10-CM

## 2024-11-21 ENCOUNTER — OFFICE VISIT (OUTPATIENT)
Dept: FAMILY MEDICINE | Facility: CLINIC | Age: 44
End: 2024-11-21
Payer: COMMERCIAL

## 2024-11-21 VITALS
SYSTOLIC BLOOD PRESSURE: 120 MMHG | HEIGHT: 64 IN | BODY MASS INDEX: 32.95 KG/M2 | DIASTOLIC BLOOD PRESSURE: 72 MMHG | HEART RATE: 56 BPM | WEIGHT: 193 LBS

## 2024-11-21 DIAGNOSIS — Z23 INFLUENZA VACCINE ADMINISTERED: ICD-10-CM

## 2024-11-21 DIAGNOSIS — I10 ESSENTIAL HYPERTENSION: Primary | ICD-10-CM

## 2024-11-21 DIAGNOSIS — Z11.59 NEED FOR HEPATITIS C SCREENING TEST: ICD-10-CM

## 2024-11-21 DIAGNOSIS — E78.2 MIXED HYPERLIPIDEMIA: ICD-10-CM

## 2024-11-21 DIAGNOSIS — Z79.899 LONG-TERM USE OF HIGH-RISK MEDICATION: ICD-10-CM

## 2024-11-21 DIAGNOSIS — R74.8 ELEVATED LIVER ENZYMES: ICD-10-CM

## 2024-11-21 LAB
ALBUMIN SERPL-MCNC: 4.6 G/DL (ref 3.6–5.1)
ALBUMIN/CREAT UR: NORMAL MG/G CREAT
ALBUMIN/GLOB SERPL: 1.8 (CALC) (ref 1–2.5)
ALP SERPL-CCNC: 48 U/L (ref 31–125)
ALT SERPL-CCNC: 44 U/L (ref 6–29)
APPEARANCE UR: CLEAR
AST SERPL-CCNC: 38 U/L (ref 10–35)
BACTERIA #/AREA URNS HPF: NORMAL /HPF
BACTERIA UR CULT: NORMAL
BASOPHILS # BLD AUTO: 22 CELLS/UL (ref 0–200)
BASOPHILS NFR BLD AUTO: 0.4 %
BILIRUB SERPL-MCNC: 0.8 MG/DL (ref 0.2–1.2)
BILIRUB UR QL STRIP: NEGATIVE
BUN SERPL-MCNC: 18 MG/DL (ref 7–25)
BUN/CREAT SERPL: ABNORMAL (CALC) (ref 6–22)
CALCIUM SERPL-MCNC: 9.6 MG/DL (ref 8.6–10.2)
CHLORIDE SERPL-SCNC: 100 MMOL/L (ref 98–110)
CHOLEST SERPL-MCNC: 231 MG/DL
CHOLEST/HDLC SERPL: 3.3 (CALC)
CO2 SERPL-SCNC: 27 MMOL/L (ref 20–32)
COLOR UR: YELLOW
CREAT SERPL-MCNC: 0.95 MG/DL (ref 0.5–0.99)
CREAT UR-MCNC: 59 MG/DL (ref 20–275)
EGFR: 75 ML/MIN/1.73M2
EOSINOPHIL # BLD AUTO: 32 CELLS/UL (ref 15–500)
EOSINOPHIL NFR BLD AUTO: 0.6 %
ERYTHROCYTE [DISTWIDTH] IN BLOOD BY AUTOMATED COUNT: 12.3 % (ref 11–15)
GLOBULIN SER CALC-MCNC: 2.6 G/DL (CALC) (ref 1.9–3.7)
GLUCOSE SERPL-MCNC: 84 MG/DL (ref 65–99)
GLUCOSE UR QL STRIP: NEGATIVE
HCT VFR BLD AUTO: 41.1 % (ref 35–45)
HDLC SERPL-MCNC: 71 MG/DL
HGB BLD-MCNC: 13.5 G/DL (ref 11.7–15.5)
HGB UR QL STRIP: NEGATIVE
HYALINE CASTS #/AREA URNS LPF: NORMAL /LPF
KETONES UR QL STRIP: NEGATIVE
LDLC SERPL CALC-MCNC: 145 MG/DL (CALC)
LEUKOCYTE ESTERASE UR QL STRIP: NEGATIVE
LYMPHOCYTES # BLD AUTO: 1636 CELLS/UL (ref 850–3900)
LYMPHOCYTES NFR BLD AUTO: 30.3 %
MCH RBC QN AUTO: 31 PG (ref 27–33)
MCHC RBC AUTO-ENTMCNC: 32.8 G/DL (ref 32–36)
MCV RBC AUTO: 94.5 FL (ref 80–100)
MICROALBUMIN UR-MCNC: <0.2 MG/DL
MONOCYTES # BLD AUTO: 259 CELLS/UL (ref 200–950)
MONOCYTES NFR BLD AUTO: 4.8 %
NEUTROPHILS # BLD AUTO: 3451 CELLS/UL (ref 1500–7800)
NEUTROPHILS NFR BLD AUTO: 63.9 %
NITRITE UR QL STRIP: NEGATIVE
NONHDLC SERPL-MCNC: 160 MG/DL (CALC)
PH UR STRIP: NORMAL [PH] (ref 5–8)
PLATELET # BLD AUTO: 286 THOUSAND/UL (ref 140–400)
PMV BLD REES-ECKER: 10.9 FL (ref 7.5–12.5)
POTASSIUM SERPL-SCNC: 4 MMOL/L (ref 3.5–5.3)
PROT SERPL-MCNC: 7.2 G/DL (ref 6.1–8.1)
PROT UR QL STRIP: NEGATIVE
RBC # BLD AUTO: 4.35 MILLION/UL (ref 3.8–5.1)
RBC #/AREA URNS HPF: NORMAL /HPF
SERVICE CMNT-IMP: NORMAL
SODIUM SERPL-SCNC: 136 MMOL/L (ref 135–146)
SP GR UR STRIP: 1.01 (ref 1–1.03)
SQUAMOUS #/AREA URNS HPF: NORMAL /HPF
TRIGL SERPL-MCNC: 55 MG/DL
TSH SERPL-ACNC: 1.13 MIU/L
WBC # BLD AUTO: 5.4 THOUSAND/UL (ref 3.8–10.8)
WBC #/AREA URNS HPF: NORMAL /HPF

## 2024-11-27 ENCOUNTER — HOSPITAL ENCOUNTER (OUTPATIENT)
Dept: RADIOLOGY | Facility: CLINIC | Age: 44
Discharge: HOME | End: 2024-11-27
Attending: OBSTETRICS & GYNECOLOGY
Payer: COMMERCIAL

## 2024-11-27 DIAGNOSIS — Z12.31 VISIT FOR SCREENING MAMMOGRAM: ICD-10-CM

## 2024-11-27 PROCEDURE — 77063 BREAST TOMOSYNTHESIS BI: CPT | Mod: 26,,, | Performed by: RADIOLOGY

## 2024-11-27 PROCEDURE — 77067 SCR MAMMO BI INCL CAD: CPT | Mod: TC,PO

## 2024-11-27 PROCEDURE — 77067 SCR MAMMO BI INCL CAD: CPT | Mod: 26,,, | Performed by: RADIOLOGY

## 2025-03-26 ENCOUNTER — OFFICE VISIT (OUTPATIENT)
Dept: OBSTETRICS AND GYNECOLOGY | Facility: CLINIC | Age: 45
End: 2025-03-26
Payer: COMMERCIAL

## 2025-03-26 VITALS
HEIGHT: 64 IN | BODY MASS INDEX: 35.26 KG/M2 | WEIGHT: 206.56 LBS | SYSTOLIC BLOOD PRESSURE: 142 MMHG | DIASTOLIC BLOOD PRESSURE: 82 MMHG

## 2025-03-26 DIAGNOSIS — I10 ESSENTIAL HYPERTENSION: ICD-10-CM

## 2025-03-26 DIAGNOSIS — Z90.710 S/P TAH (TOTAL ABDOMINAL HYSTERECTOMY): ICD-10-CM

## 2025-03-26 DIAGNOSIS — Z01.419 ENCOUNTER FOR GYNECOLOGICAL EXAMINATION WITHOUT ABNORMAL FINDING: ICD-10-CM

## 2025-03-26 DIAGNOSIS — N76.0 BV (BACTERIAL VAGINOSIS): Primary | ICD-10-CM

## 2025-03-26 DIAGNOSIS — B96.89 BV (BACTERIAL VAGINOSIS): Primary | ICD-10-CM

## 2025-03-26 PROCEDURE — 3079F DIAST BP 80-89 MM HG: CPT | Mod: CPTII,S$GLB,, | Performed by: OBSTETRICS & GYNECOLOGY

## 2025-03-26 PROCEDURE — 4010F ACE/ARB THERAPY RXD/TAKEN: CPT | Mod: CPTII,S$GLB,, | Performed by: OBSTETRICS & GYNECOLOGY

## 2025-03-26 PROCEDURE — 3008F BODY MASS INDEX DOCD: CPT | Mod: CPTII,S$GLB,, | Performed by: OBSTETRICS & GYNECOLOGY

## 2025-03-26 PROCEDURE — 1159F MED LIST DOCD IN RCRD: CPT | Mod: CPTII,S$GLB,, | Performed by: OBSTETRICS & GYNECOLOGY

## 2025-03-26 PROCEDURE — 3077F SYST BP >= 140 MM HG: CPT | Mod: CPTII,S$GLB,, | Performed by: OBSTETRICS & GYNECOLOGY

## 2025-03-26 PROCEDURE — 81515 NFCT DS BV&VAGINITIS DNA ALG: CPT | Performed by: OBSTETRICS & GYNECOLOGY

## 2025-03-26 PROCEDURE — 1160F RVW MEDS BY RX/DR IN RCRD: CPT | Mod: CPTII,S$GLB,, | Performed by: OBSTETRICS & GYNECOLOGY

## 2025-03-26 PROCEDURE — 99999 PR PBB SHADOW E&M-EST. PATIENT-LVL III: CPT | Mod: PBBFAC,,, | Performed by: OBSTETRICS & GYNECOLOGY

## 2025-03-26 PROCEDURE — 99396 PREV VISIT EST AGE 40-64: CPT | Mod: S$GLB,,, | Performed by: OBSTETRICS & GYNECOLOGY

## 2025-03-30 LAB
BACTERIAL VAGINOSIS DNA (OHS): NOT DETECTED
CANDIDA GLABRATA/KRUSEI DNA (OHS): NOT DETECTED
CANDIDA SPECIES DNA (OHS): NOT DETECTED
TRICHOMONAS VAGINALIS DNA (OHS): NOT DETECTED

## 2025-03-31 NOTE — PROGRESS NOTES
HISTORY OF PRESENT ILLNESS:    Sophie Courtney is a 44 y.o. female,   presents today for her routine visit and has no complaints.      She is menopausal  Denies vaginal itching or irritation.  Denies vaginal discharge.  She is not sexually active     Last MM2024  Last Colonoscopy:  Denies domestic violence. She does feel safe at home.     Past Medical History:   Diagnosis Date    Hyperlipidemia     Hypertension        Past Surgical History:   Procedure Laterality Date    ACHILLES TENDON SURGERY Left     COLONOSCOPY N/A 2024    Procedure: COLONOSCOPY;  Surgeon: Anamika Brooks MD;  Location: [facility];  Service: Endoscopy;  Laterality: N/A;  DIRECT ACCESS    [email or web address]      KNEE ARTHROSCOPY W/ ACL RECONSTRUCTION Right     MYOMECTOMY      Seven uterine fibroids, largest 11 cm in size     MYOMECTOMY      The specimen weighs 779 g and consists of 27 rounded pieces of rubbery tan-pink tissue. The nodules vary from    SALPINGOOPHORECTOMY Right 2021    Procedure: SALPINGO-OOPHORECTOMY;  Surgeon: Peace Sandoval MD;  Location: [facility] OR;  Service: OB/GYN;  Laterality: Right;    TOTAL ABDOMINAL HYSTERECTOMY N/A 2021    Procedure: HYSTERECTOMY, TOTAL, ABDOMINAL;  Surgeon: Peace Sandoval MD;  Location: [facility];  Service: OB/GYN;  Laterality: N/A;    WISDOM TOOTH EXTRACTION  2013 &         MEDICATIONS AND ALLERGIES:    Current Medications[1]    Review of patient's allergies indicates:   Allergen Reactions    Cobalt Hives, Rash and Swelling    Crestor [rosuvastatin] Other (See Comments)     Muscle weakness    Nickel sutures [surgical stainless steel] Hives    Neosporin [hydrocortisone] Rash       Family History   Problem Relation Name Age of Onset    Hypertension Father      Hypertension Mother      Stroke Maternal Aunt      Breast cancer Paternal Aunt  60       Social History[2]    COMPREHENSIVE GYN HISTORY:  PAP History: Denies abnormal  "Paps.  Infection History: Denies STDs. Denies PID.  Benign History: Denies uterine fibroids. Denies ovarian cysts. Denies endometriosis. Denies other conditions.  Cancer History: Denies cervical cancer. Denies uterine cancer or hyperplasia. Denies ovarian cancer. Denies vulvar cancer or pre-cancer. Denies vaginal cancer or pre-cancer. Denies breast cancer. Denies colon cancer.  Sexual Activity History: Reports currently being sexually active  Menstrual History: Denies menses. Pt is  not on ERT.     ROS:  GENERAL: No weight changes. No swelling. No fatigue. No fever.  CARDIOVASCULAR: No chest pain. No shortness of breath. No leg cramps.   NEUROLOGICAL: No headaches. No vision changes.  BREASTS: No pain. No lumps. No discharge.  ABDOMEN: No pain. No nausea. No vomiting. No diarrhea. No constipation.  REPRODUCTIVE: No abnormal bleeding.  VULVA: No pain. No lesions. No itching.  VAGINA: No relaxation. No itching. No odor. No discharge. No lesions.  URINARY: No incontinence. No nocturia. No frequency. No dysuria.    BP (!) 142/82 (BP Location: Right arm, Patient Position: Sitting)   Ht 5' 4" (1.626 m)   Wt 93.7 kg (206 lb 9.1 oz)   LMP 03/11/2021 (LMP Unknown)   BMI 35.46 kg/m²     PE:  APPEARANCE: Well nourished, well developed, in no acute distress.  AFFECT: WNL, alert and oriented x 3.  SKIN: No acne or hirsutism.  NECK: Neck symmetric without masses or thyromegaly.  NODES: No inguinal, cervical, axillary or femoral lymph node enlargement.  CHEST: Good respiratory effort.   ABDOMEN: Soft. No tenderness or masses. No hepatosplenomegaly. No hernias.  BREASTS: Symmetrical, no skin changes or visible lesions. No palpable masses, nipple discharge bilaterally.  PELVIC: ATROPHIC EXTERNAL FEMALE GENITALIA without lesions. Normal hair distribution. Adequate perineal body, normal urethral meatus. VAGINA DRY without lesions or discharge. No significant cystocele or rectocele. Bimanual exam shows CERVIX and UTERUS to be " SURGICALLY ABSENT. Adnexa without masses or tenderness.  EXTREMITIES: No edema.    DIAGNOSIS:  1. BV (bacterial vaginosis)    2. Encounter for gynecological examination without abnormal finding    3. Essential hypertension    4. S/P OSMIN (total abdominal hysterectomy)/RSO        Orders Placed This Encounter    Vaginosis Screen by DNA Probe       COUNSELING:  The patient was counseled today on  -osteoporosis prevention, calcium supplementation, regular weight bearing exercise.  -ACS PAP guidelines (no paps), with recommendations for yearly pelvic exams as her uterus and cervix were removed for benign reasons and ovaries remain;  -recommendation for yearly mammogram;  -to see her primary care physician for all other health maintenance.    FOLLOW-UP with me for next routine visit.        [1]   Current Outpatient Medications:     ascorbic acid, vitamin C, (VITAMIN C) 1000 MG tablet, Take 1,000 mg by mouth once daily., Disp: , Rfl:     atorvastatin (LIPITOR) 20 MG tablet, Take 1 tablet (20 mg total) by mouth once daily., Disp: 90 tablet, Rfl: 1    chlorthalidone (HYGROTEN) 25 MG Tab, Take 1 tablet (25 mg total) by mouth once daily., Disp: 90 tablet, Rfl: 3    ergocalciferol, vitamin D2, (VITAMIN D ORAL), Take by mouth. Per patient, she takes vitamin D 3 tab, not D 2., Disp: , Rfl:     multivitamin capsule, Take 1 capsule by mouth once daily., Disp: , Rfl:     valsartan (DIOVAN) 80 MG tablet, Take 1 tablet (80 mg total) by mouth once daily., Disp: 90 tablet, Rfl: 1    peg 3350-sod sulf,chlr-pot-mag (SUFLAVE) 178.7-7.3-0.5 gram SolR, Drink 1 bottle by mouth, twice a day, split dose per prep instructions. Drink 8 oz every 15 minutes until empty followed by 16 oz of water. Repeat per prep instructions timing., Disp: 2 each, Rfl: 0  [2]   Social History  Socioeconomic History    Marital status: Single   Tobacco Use    Smoking status: Never    Smokeless tobacco: Never   Substance and Sexual Activity    Alcohol use: No    Drug  use: No    Sexual activity: Never     Social Drivers of Health     Financial Resource Strain: Low Risk  (5/26/2024)    Overall Financial Resource Strain (CARDIA)     Difficulty of Paying Living Expenses: Not very hard   Food Insecurity: No Food Insecurity (5/26/2024)    Hunger Vital Sign     Worried About Running Out of Food in the Last Year: Never true     Ran Out of Food in the Last Year: Never true   Transportation Needs: No Transportation Needs (7/4/2023)    PRAPARE - Transportation     Lack of Transportation (Medical): No     Lack of Transportation (Non-Medical): No   Physical Activity: Sufficiently Active (5/26/2024)    Exercise Vital Sign     Days of Exercise per Week: 4 days     Minutes of Exercise per Session: 60 min   Stress: No Stress Concern Present (5/26/2024)    [employer] - Occupational Stress Questionnaire     Feeling of Stress : Only a little   Housing Stability: Low Risk  (7/4/2023)    Housing Stability Vital Sign     Unable to Pay for Housing in the Last Year: No     Number of Places Lived in the Last Year: 1     Unstable Housing in the Last Year: No

## 2025-05-07 ENCOUNTER — TELEPHONE (OUTPATIENT)
Dept: FAMILY MEDICINE | Facility: CLINIC | Age: 45
End: 2025-05-07
Payer: COMMERCIAL

## 2025-05-19 NOTE — PROGRESS NOTES
SUBJECTIVE:    Patient ID: Sophie Courtney is a 44 y.o. female.    Chief Complaint: Follow-up (6 mo f/u//no med bottles//no new issues//tc)    Pt here to checkup on acute and chronic conditions.    BP is doing today.   Denies CP/SOB/HA.  Has not been taking chlorthalindone, diovan sporadically, a few days a week. Trying to move it closer to her bed so as to remember to take it.   Has been exercising a few days a week. Down 7 pounds since last visit.  (Goal is 40-50 pounds)    Had labs done: , fBS 84, GFR 75, AST/ALT 38/44, TSH 1.13    Has not been better taking her lipitor. Used to take at least twice a week.          -------------------------------------------------------------------------------------------------  Mammo 11/2024, scheduled for next week.   Pap (Jaime)  Cscope 11/2024, to repeat in 10 years (Todd)       Office Visit on 03/26/2025   Component Date Value Ref Range Status    Bacterial vaginosis DNA 03/26/2025 Not Detected  Not Detected Final    Bacterial Vaginosis DNA includes: Atopobium spp., Bacterial Vaginosis-Associated Bacterium 2, Megaspaera-1    Fatemeh species DNA 03/26/2025 Not Detected  Not Detected Final    Candida species group includes: Candida albicans, Candida tropicalis, Candida parapsiolosis, Candida dubliniensis    Fatemeh glabrata/krusei DNA 03/26/2025 Not Detected  Not Detected Final    Trichomonas vaginalis DNA 03/26/2025 Not Detected  Not Detected Final   Office Visit on 11/21/2024   Component Date Value Ref Range Status    Cholesterol 05/20/2025 231 (H)  <200 mg/dL Final    HDL 05/20/2025 63  > OR = 50 mg/dL Final    Triglycerides 05/20/2025 52  <150 mg/dL Final    LDL Cholesterol 05/20/2025 154 (H)  mg/dL (calc) Final    Comment: Reference range: <100     Desirable range <100 mg/dL for primary prevention;    <70 mg/dL for patients with CHD or diabetic patients   with > or = 2 CHD risk factors.     LDL-C is now calculated using the Michael-Murry   calculation,  which is a validated novel method providing   better accuracy than the Friedewald equation in the   estimation of LDL-C.   Michael SS et al. KEZIA. 2013;310(19): 3132-1463   (http://education.[employer].card.io/faq/XCJ952)      HDL/Cholesterol Ratio 05/20/2025 3.7  <5.0 (calc) Final    Non HDL Chol. (LDL+VLDL) 05/20/2025 168 (H)  <130 mg/dL (calc) Final    Comment: For patients with diabetes plus 1 major ASCVD risk   factor, treating to a non-HDL-C goal of <100 mg/dL   (LDL-C of <70 mg/dL) is considered a therapeutic   option.     Orders Only on 11/20/2024   Component Date Value Ref Range Status    Cholesterol 11/20/2024 231 (H)  <200 mg/dL Final    HDL 11/20/2024 71  > OR = 50 mg/dL Final    Triglycerides 11/20/2024 55  <150 mg/dL Final    LDL Cholesterol 11/20/2024 145 (H)  mg/dL (calc) Final    Comment: Reference range: <100     Desirable range <100 mg/dL for primary prevention;    <70 mg/dL for patients with CHD or diabetic patients   with > or = 2 CHD risk factors.     LDL-C is now calculated using the Michael-Murry   calculation, which is a validated novel method providing   better accuracy than the Friedewald equation in the   estimation of LDL-C.   Michael SS et al. KEZIA. 2013;310(19): 7531-9339   (http://education.[employer].com/faq/JEH533)      HDL/Cholesterol Ratio 11/20/2024 3.3  <5.0 (calc) Final    Non HDL Chol. (LDL+VLDL) 11/20/2024 160 (H)  <130 mg/dL (calc) Final    Comment: For patients with diabetes plus 1 major ASCVD risk   factor, treating to a non-HDL-C goal of <100 mg/dL   (LDL-C of <70 mg/dL) is considered a therapeutic   option.      Glucose 11/20/2024 84  65 - 99 mg/dL Final    Comment:               Fasting reference interval         BUN 11/20/2024 18  7 - 25 mg/dL Final    Creatinine 11/20/2024 0.95  0.50 - 0.99 mg/dL Final    eGFR 11/20/2024 75  > OR = 60 mL/min/1.73m2 Final    BUN/Creatinine Ratio 11/20/2024 SEE NOTE:  6 - 22 (calc) Final    Comment:    Not Reported: BUN and  Creatinine are within     reference range.            Sodium 11/20/2024 136  135 - 146 mmol/L Final    Potassium 11/20/2024 4.0  3.5 - 5.3 mmol/L Final    Chloride 11/20/2024 100  98 - 110 mmol/L Final    CO2 11/20/2024 27  20 - 32 mmol/L Final    Calcium 11/20/2024 9.6  8.6 - 10.2 mg/dL Final    Total Protein 11/20/2024 7.2  6.1 - 8.1 g/dL Final    Albumin 11/20/2024 4.6  3.6 - 5.1 g/dL Final    Globulin, Total 11/20/2024 2.6  1.9 - 3.7 g/dL (calc) Final    Albumin/Globulin Ratio 11/20/2024 1.8  1.0 - 2.5 (calc) Final    Total Bilirubin 11/20/2024 0.8  0.2 - 1.2 mg/dL Final    Alkaline Phosphatase 11/20/2024 48  31 - 125 U/L Final    AST 11/20/2024 38 (H)  10 - 35 U/L Final    ALT 11/20/2024 44 (H)  6 - 29 U/L Final    Creatinine, Urine 11/20/2024 59  20 - 275 mg/dL Final    Microalb, Ur 11/20/2024 <0.2  See Note: mg/dL Final    Comment: Reference Range:    Reference Range  Not established      Microalb/Creat Ratio 11/20/2024 NOTE  <30 mg/g creat Final    Comment: NOTE: The urine albumin value is less than   0.2 mg/dL therefore we are unable to calculate   excretion and/or creatinine ratio.     The ADA defines abnormalities in albumin  excretion as follows:     Albuminuria Category        Result (mg/g creatinine)     Normal to Mildly increased   <30  Moderately increased            Severely increased           > OR = 300     The ADA recommends that at least two of three  specimens collected within a 3-6 month period be  abnormal before considering a patient to be  within a diagnostic category.      Color, UA 11/20/2024 YELLOW  YELLOW Final    Appearance, UA 11/20/2024 CLEAR  CLEAR Final    Specific Gravity, UA 11/20/2024 1.010  1.001 - 1.035 Final    pH, UA 11/20/2024 < OR = 5.0  5.0 - 8.0 Final    Glucose, UA 11/20/2024 NEGATIVE  NEGATIVE Final    Bilirubin, UA 11/20/2024 NEGATIVE  NEGATIVE Final    Ketones, UA 11/20/2024 NEGATIVE  NEGATIVE Final    Occult Blood UA 11/20/2024 NEGATIVE  NEGATIVE Final     Protein, UA 11/20/2024 NEGATIVE  NEGATIVE Final    Nitrite, UA 11/20/2024 NEGATIVE  NEGATIVE Final    Leukocytes, UA 11/20/2024 NEGATIVE  NEGATIVE Final    WBC Casts, UA 11/20/2024 NONE SEEN  < OR = 5 /HPF Final    RBC Casts, UA 11/20/2024 NONE SEEN  < OR = 2 /HPF Final    Squam Epithel, UA 11/20/2024 NONE SEEN  < OR = 5 /HPF Final    Bacteria, UA 11/20/2024 NONE SEEN  NONE SEEN /HPF Final    Hyaline Casts, UA 11/20/2024 NONE SEEN  NONE SEEN /LPF Final    Service Cmt: 11/20/2024 SEE COMMENT   Final    Comment: This urine was analyzed for the presence of WBC,   RBC, bacteria, casts, and other formed elements.   Only those elements seen were reported.            Reflexive Urine Culture 11/20/2024 SEE COMMENT   Final    NO CULTURE INDICATED    WBC 11/20/2024 5.4  3.8 - 10.8 Thousand/uL Final    RBC 11/20/2024 4.35  3.80 - 5.10 Million/uL Final    Hemoglobin 11/20/2024 13.5  11.7 - 15.5 g/dL Final    Hematocrit 11/20/2024 41.1  35.0 - 45.0 % Final    MCV 11/20/2024 94.5  80.0 - 100.0 fL Final    MCH 11/20/2024 31.0  27.0 - 33.0 pg Final    MCHC 11/20/2024 32.8  32.0 - 36.0 g/dL Final    Comment: For adults, a slight decrease in the calculated MCHC  value (in the range of 30 to 32 g/dL) is most likely  not clinically significant; however, it should be  interpreted with caution in correlation with other  red cell parameters and the patient's clinical  condition.      RDW 11/20/2024 12.3  11.0 - 15.0 % Final    Platelets 11/20/2024 286  140 - 400 Thousand/uL Final    MPV 11/20/2024 10.9  7.5 - 12.5 fL Final    Neutrophils, Abs 11/20/2024 3,451  1,500 - 7,800 cells/uL Final    Lymph # 11/20/2024 1,636  850 - 3,900 cells/uL Final    Mono # 11/20/2024 259  200 - 950 cells/uL Final    Eos # 11/20/2024 32  15 - 500 cells/uL Final    Baso # 11/20/2024 22  0 - 200 cells/uL Final    Neutrophils Relative 11/20/2024 63.9  % Final    Lymph % 11/20/2024 30.3  % Final    Mono % 11/20/2024 4.8  % Final    Eosinophil % 11/20/2024 0.6  %  Final    Basophil % 11/20/2024 0.4  % Final    TSH w/reflex to FT4 11/20/2024 1.13  mIU/L Final    Comment:           Reference Range                         > or = 20 Years  0.40-4.50                              Pregnancy Ranges            First trimester    0.26-2.66            Second trimester   0.55-2.73            Third trimester    0.43-2.91     Telephone on 11/20/2024   Component Date Value Ref Range Status    WBC 05/20/2025 6.8  3.8 - 10.8 Thousand/uL Final    RBC 05/20/2025 4.48  3.80 - 5.10 Million/uL Final    Hemoglobin 05/20/2025 13.9  11.7 - 15.5 g/dL Final    Hematocrit 05/20/2025 42.1  35.0 - 45.0 % Final    MCV 05/20/2025 94.0  80.0 - 100.0 fL Final    MCH 05/20/2025 31.0  27.0 - 33.0 pg Final    MCHC 05/20/2025 33.0  32.0 - 36.0 g/dL Final    Comment: For adults, a slight decrease in the calculated MCHC  value (in the range of 30 to 32 g/dL) is most likely  not clinically significant; however, it should be  interpreted with caution in correlation with other  red cell parameters and the patient's clinical  condition.      RDW 05/20/2025 12.8  11.0 - 15.0 % Final    Platelets 05/20/2025 318  140 - 400 Thousand/uL Final    MPV 05/20/2025 11.1  7.5 - 12.5 fL Final    Neutrophils, Abs 05/20/2025 4,678  1,500 - 7,800 cells/uL Final    Lymph # 05/20/2025 1,707  850 - 3,900 cells/uL Final    Mono # 05/20/2025 381  200 - 950 cells/uL Final    Eos # 05/20/2025 27  15 - 500 cells/uL Final    Baso # 05/20/2025 7  0 - 200 cells/uL Final    Neutrophils Relative 05/20/2025 68.8  % Final    Lymph % 05/20/2025 25.1  % Final    Mono % 05/20/2025 5.6  % Final    Eosinophil % 05/20/2025 0.4  % Final    Basophil % 05/20/2025 0.1  % Final    TSH w/reflex to FT4 05/20/2025 1.18  mIU/L Final    Comment:           Reference Range                         > or = 20 Years  0.40-4.50                              Pregnancy Ranges            First trimester    0.26-2.66            Second trimester   0.55-2.73            Third  trimester    0.43-2.91      Color, UA 05/20/2025 YELLOW  YELLOW Final    Appearance, UA 05/20/2025 CLEAR  CLEAR Final    Specific Gravity, UA 05/20/2025 1.024  1.001 - 1.035 Final    pH, UA 05/20/2025 < OR = 5.0 (A)  5.0 - 8.0 Final    Glucose, UA 05/20/2025 NEGATIVE  NEGATIVE Final    Bilirubin, UA 05/20/2025 NEGATIVE  NEGATIVE Final    Ketones, UA 05/20/2025 NEGATIVE  NEGATIVE Final    Occult Blood UA 05/20/2025 NEGATIVE  NEGATIVE Final    Protein, UA 05/20/2025 NEGATIVE  NEGATIVE Final    Nitrite, UA 05/20/2025 NEGATIVE  NEGATIVE Final    Leukocytes, UA 05/20/2025 NEGATIVE  NEGATIVE Final    WBC Casts, UA 05/20/2025 0-5  < OR = 5 /HPF Final    RBC Casts, UA 05/20/2025 NONE SEEN  < OR = 2 /HPF Final    Squam Epithel, UA 05/20/2025 0-5  < OR = 5 /HPF Final    Bacteria, UA 05/20/2025 NONE SEEN  NONE SEEN /HPF Final    Hyaline Casts, UA 05/20/2025 NONE SEEN  NONE SEEN /LPF Final    Service Cmt: 05/20/2025 SEE COMMENT   Final    Comment: This urine was analyzed for the presence of WBC,   RBC, bacteria, casts, and other formed elements.   Only those elements seen were reported.            Reflexive Urine Culture 05/20/2025 SEE COMMENT   Final    NO CULTURE INDICATED         Office Visit on 03/26/2025   Component Date Value Ref Range Status    Bacterial vaginosis DNA 03/26/2025 Not Detected  Not Detected Final    Fatemeh species DNA 03/26/2025 Not Detected  Not Detected Final    Candida glabrata/krusei DNA 03/26/2025 Not Detected  Not Detected Final    Trichomonas vaginalis DNA 03/26/2025 Not Detected  Not Detected Final       Past Medical History:   Diagnosis Date    Hyperlipidemia     Hypertension      Social History     Socioeconomic History    Marital status: Single   Tobacco Use    Smoking status: Never    Smokeless tobacco: Never   Substance and Sexual Activity    Alcohol use: No    Drug use: No    Sexual activity: Never     Social Drivers of Health     Financial Resource Strain: Low Risk  (5/26/2024)    Overall  Financial Resource Strain (CARDIA)     Difficulty of Paying Living Expenses: Not very hard   Food Insecurity: No Food Insecurity (5/26/2024)    Hunger Vital Sign     Worried About Running Out of Food in the Last Year: Never true     Ran Out of Food in the Last Year: Never true   Transportation Needs: No Transportation Needs (7/4/2023)    PRAPARE - Transportation     Lack of Transportation (Medical): No     Lack of Transportation (Non-Medical): No   Physical Activity: Sufficiently Active (5/26/2024)    Exercise Vital Sign     Days of Exercise per Week: 4 days     Minutes of Exercise per Session: 60 min   Stress: No Stress Concern Present (5/26/2024)    [employer] [place] - Occupational Stress Questionnaire     Feeling of Stress : Only a little   Housing Stability: Low Risk  (7/4/2023)    Housing Stability Vital Sign     Unable to Pay for Housing in the Last Year: No     Number of Places Lived in the Last Year: 1     Unstable Housing in the Last Year: No     Past Surgical History:   Procedure Laterality Date    ACHILLES TENDON SURGERY Left 2003    COLONOSCOPY N/A 11/08/2024    Procedure: COLONOSCOPY;  Surgeon: Anamika Brooks MD;  Location: [facility];  Service: Endoscopy;  Laterality: N/A;  DIRECT ACCESS    [email or web address]      KNEE ARTHROSCOPY W/ ACL RECONSTRUCTION Right 1999    MYOMECTOMY  2009    Seven uterine fibroids, largest 11 cm in size     MYOMECTOMY  2014    The specimen weighs 779 g and consists of 27 rounded pieces of rubbery tan-pink tissue. The nodules vary from    SALPINGOOPHORECTOMY Right 03/12/2021    Procedure: SALPINGO-OOPHORECTOMY;  Surgeon: Peace Sandoval MD;  Location: [facility];  Service: OB/GYN;  Laterality: Right;    TOTAL ABDOMINAL HYSTERECTOMY N/A 03/12/2021    Procedure: HYSTERECTOMY, TOTAL, ABDOMINAL;  Surgeon: Peace Sandoval MD;  Location: [facility];  Service: OB/GYN;  Laterality: N/A;    WISDOM TOOTH EXTRACTION  2013 2013 & 2015      Family History    Problem Relation Name Age of Onset    Hypertension Father      Hypertension Mother      Stroke Maternal Aunt      Breast cancer Paternal Aunt  60       Review of patient's allergies indicates:   Allergen Reactions    [place] Hives, Rash and Swelling    Crestor [rosuvastatin] Other (See Comments)     Muscle weakness    Nickel sutures [surgical stainless steel] Hives    Neosporin [hydrocortisone] Rash       Current Outpatient Medications:     ascorbic acid, vitamin C, (VITAMIN C) 1000 MG tablet, Take 1,000 mg by mouth once daily., Disp: , Rfl:     atorvastatin (LIPITOR) 20 MG tablet, Take 1 tablet (20 mg total) by mouth once daily., Disp: 90 tablet, Rfl: 1    chlorthalidone (HYGROTEN) 25 MG Tab, Take 1 tablet (25 mg total) by mouth once daily., Disp: 90 tablet, Rfl: 3    ergocalciferol, vitamin D2, (VITAMIN D ORAL), Take by mouth. Per patient, she takes vitamin D 3 tab, not D 2., Disp: , Rfl:     multivitamin capsule, Take 1 capsule by mouth once daily., Disp: , Rfl:     valsartan (DIOVAN) 80 MG tablet, Take 1 tablet (80 mg total) by mouth once daily., Disp: 90 tablet, Rfl: 1    Review of Systems   Constitutional:  Negative for activity change, appetite change, fatigue, fever and unexpected weight change.   HENT:  Negative for hearing loss, rhinorrhea and trouble swallowing.    Eyes:  Negative for discharge and visual disturbance.   Respiratory:  Negative for cough, chest tightness, shortness of breath and wheezing.    Cardiovascular:  Negative for chest pain, palpitations and leg swelling.   Gastrointestinal:  Negative for abdominal pain, blood in stool, constipation, diarrhea, nausea and vomiting.        -heartburn   Endocrine: Negative for polydipsia and polyuria.   Genitourinary:  Negative for difficulty urinating, dysuria, frequency, hematuria, menstrual problem and urgency.   Musculoskeletal:  Negative for arthralgias, back pain, joint swelling, myalgias and neck pain.   Skin:  Negative for rash.  "  Neurological:  Negative for dizziness, weakness, numbness and headaches.   Hematological:  Does not bruise/bleed easily.   Psychiatric/Behavioral:  Negative for confusion, dysphoric mood, sleep disturbance and suicidal ideas. The patient is not nervous/anxious.    All other systems reviewed and are negative.         Objective:      Vitals:    05/21/25 0808   BP: 130/72   Pulse: 60   SpO2: 98%   Weight: 90.3 kg (199 lb)   Height: 5' 4" (1.626 m)           Wt Readings from Last 6 Encounters:   05/21/25 90.3 kg (199 lb)   03/26/25 93.7 kg (206 lb 9.1 oz)   11/21/24 87.5 kg (193 lb)   05/30/24 83.5 kg (184 lb)   12/19/23 77.4 kg (170 lb 10.2 oz)   07/05/23 77.6 kg (171 lb)         Physical Exam  Vitals reviewed.   Constitutional:       General: She is not in acute distress.     Appearance: Normal appearance. She is well-developed. She is obese.   HENT:      Head: Normocephalic and atraumatic.   Neck:      Thyroid: No thyromegaly.   Cardiovascular:      Rate and Rhythm: Normal rate and regular rhythm.      Heart sounds: Normal heart sounds. No murmur heard.     No friction rub.   Pulmonary:      Effort: Pulmonary effort is normal.      Breath sounds: Normal breath sounds. No wheezing or rales.   Abdominal:      General: Bowel sounds are normal. There is no distension.      Palpations: Abdomen is soft.      Tenderness: There is no abdominal tenderness.   Musculoskeletal:      Right hand: No bony tenderness. Decreased range of motion (5th finger flexion).      Cervical back: Neck supple.   Lymphadenopathy:      Cervical: No cervical adenopathy.   Skin:     General: Skin is warm and dry.      Findings: No rash.   Neurological:      Mental Status: She is alert and oriented to person, place, and time.   Psychiatric:         Attention and Perception: She is attentive.         Speech: Speech normal.         Behavior: Behavior normal.         Thought Content: Thought content normal.         Judgment: Judgment normal.       "     Assessment:       1. Essential hypertension    2. Mixed hyperlipidemia    3. Elevated liver enzymes    4. Long-term use of high-risk medication               Plan:       Essential hypertension  Comments:  Controlled. Will continue to monitor BP med on current medication regimen. Encouraged to take medication daily.    Mixed hyperlipidemia  Comments:  Suboptimal. . Encouraged to take lipitor daily. Discussed dietary discretion.  Orders:  -     LIPID PANEL; Future; Expected date: 05/21/2025    Elevated liver enzymes  Comments:  Acute. AST/ALT 38/44. Encouraged steady weight loss. Will repeat and follow. Suspect NAFL  Orders:  -     Comprehensive Metabolic Panel; Future; Expected date: 05/21/2025  -     LIPID PANEL; Future; Expected date: 05/21/2025    Long-term use of high-risk medication  -     Comprehensive Metabolic Panel; Future; Expected date: 05/21/2025  -     LIPID PANEL; Future; Expected date: 05/21/2025        Other  Lab results discussed and reviewed with patient.  Labs and/or tests have been ordered for the evaluation/monitoring of acute/chronic conditions, to be done just before next visit.    Follow up in about 6 months (around 11/21/2025) for HTN, HLD, LABS.        5/21/2025 Rohan Pringle

## 2025-05-21 ENCOUNTER — OFFICE VISIT (OUTPATIENT)
Dept: FAMILY MEDICINE | Facility: CLINIC | Age: 45
End: 2025-05-21
Payer: COMMERCIAL

## 2025-05-21 VITALS
HEIGHT: 64 IN | WEIGHT: 199 LBS | DIASTOLIC BLOOD PRESSURE: 72 MMHG | BODY MASS INDEX: 33.97 KG/M2 | SYSTOLIC BLOOD PRESSURE: 130 MMHG | HEART RATE: 60 BPM | OXYGEN SATURATION: 98 %

## 2025-05-21 DIAGNOSIS — R74.8 ELEVATED LIVER ENZYMES: ICD-10-CM

## 2025-05-21 DIAGNOSIS — E78.2 MIXED HYPERLIPIDEMIA: ICD-10-CM

## 2025-05-21 DIAGNOSIS — Z79.899 LONG-TERM USE OF HIGH-RISK MEDICATION: ICD-10-CM

## 2025-05-21 DIAGNOSIS — I10 ESSENTIAL HYPERTENSION: Primary | ICD-10-CM

## 2025-05-21 PROCEDURE — 99214 OFFICE O/P EST MOD 30 MIN: CPT | Mod: S$GLB,,, | Performed by: FAMILY MEDICINE

## 2025-05-21 PROCEDURE — 1160F RVW MEDS BY RX/DR IN RCRD: CPT | Mod: CPTII,S$GLB,, | Performed by: FAMILY MEDICINE

## 2025-05-21 PROCEDURE — 3078F DIAST BP <80 MM HG: CPT | Mod: CPTII,S$GLB,, | Performed by: FAMILY MEDICINE

## 2025-05-21 PROCEDURE — 4010F ACE/ARB THERAPY RXD/TAKEN: CPT | Mod: CPTII,S$GLB,, | Performed by: FAMILY MEDICINE

## 2025-05-21 PROCEDURE — 3075F SYST BP GE 130 - 139MM HG: CPT | Mod: CPTII,S$GLB,, | Performed by: FAMILY MEDICINE

## 2025-05-21 PROCEDURE — 1159F MED LIST DOCD IN RCRD: CPT | Mod: CPTII,S$GLB,, | Performed by: FAMILY MEDICINE

## 2025-05-21 PROCEDURE — 3008F BODY MASS INDEX DOCD: CPT | Mod: CPTII,S$GLB,, | Performed by: FAMILY MEDICINE

## (undated) DEVICE — SOL STRL WATER INJ 1000ML BG

## (undated) DEVICE — GLOVE BIOGEL SKINSENSE PI 6.0

## (undated) DEVICE — APPLICATOR CHLORAPREP ORN 26ML

## (undated) DEVICE — DRESSING ADHESIVE ISLAND 3 X 6

## (undated) DEVICE — Device

## (undated) DEVICE — SOL WATER STRL IRR 1000ML

## (undated) DEVICE — SUT PLAIN GUT 3-0 CTX 27

## (undated) DEVICE — JELLY SURGILUBE 5GR

## (undated) DEVICE — SYR 10CC LUER LOCK

## (undated) DEVICE — TRAY DRY SKIN SCRUB PREP

## (undated) DEVICE — ELECTRODE REM PLYHSV RETURN 9

## (undated) DEVICE — DRESSING ABSRBNT ISLAND 3.6X8

## (undated) DEVICE — BELLOW CANN HEMOBLAST 1.65GR

## (undated) DEVICE — BLADE SURG STAINLESS STEEL #10

## (undated) DEVICE — ELECTRODE BLD EXT INSUL 1

## (undated) DEVICE — SEE MEDLINE ITEM 152622

## (undated) DEVICE — SOL 9P NACL IRR PIC IL

## (undated) DEVICE — KIT URINE METER 350ML STAT LOC

## (undated) DEVICE — GLOVE BIOGEL SKINSENSE PI 6.5

## (undated) DEVICE — WARMER DRAPE STERILE LF

## (undated) DEVICE — SEE MEDLINE ITEM 153151

## (undated) DEVICE — SOL PVP-I SCRUB 7.5% 4OZ

## (undated) DEVICE — SUT 0 8-27IN VICRYL PL CT-1

## (undated) DEVICE — SCRUB 10% POVIDONE IODINE 4OZ

## (undated) DEVICE — SUT VICRYL 2-0 36 CT-1

## (undated) DEVICE — SUT CTD VICRYL 0 UND BR SUT